# Patient Record
Sex: FEMALE | Race: WHITE | NOT HISPANIC OR LATINO | Employment: FULL TIME | URBAN - METROPOLITAN AREA
[De-identification: names, ages, dates, MRNs, and addresses within clinical notes are randomized per-mention and may not be internally consistent; named-entity substitution may affect disease eponyms.]

---

## 2017-03-08 ENCOUNTER — PRENATAL OFFICE VISIT (OUTPATIENT)
Dept: NURSING | Facility: CLINIC | Age: 37
End: 2017-03-08
Payer: COMMERCIAL

## 2017-03-08 VITALS
BODY MASS INDEX: 27 KG/M2 | SYSTOLIC BLOOD PRESSURE: 113 MMHG | DIASTOLIC BLOOD PRESSURE: 75 MMHG | HEART RATE: 66 BPM | HEIGHT: 66 IN | TEMPERATURE: 97.9 F | WEIGHT: 168 LBS

## 2017-03-08 DIAGNOSIS — Z34.90 SUPERVISION OF NORMAL PREGNANCY: Primary | ICD-10-CM

## 2017-03-08 LAB
ABO + RH BLD: NORMAL
ABO + RH BLD: NORMAL
ALBUMIN UR-MCNC: NEGATIVE MG/DL
APPEARANCE UR: CLEAR
BILIRUB UR QL STRIP: NEGATIVE
BLD GP AB SCN SERPL QL: NORMAL
BLOOD BANK CMNT PATIENT-IMP: NORMAL
COLOR UR AUTO: YELLOW
ERYTHROCYTE [DISTWIDTH] IN BLOOD BY AUTOMATED COUNT: 13.4 % (ref 10–15)
GLUCOSE UR STRIP-MCNC: NEGATIVE MG/DL
HBV SURFACE AG SERPL QL IA: NONREACTIVE
HCT VFR BLD AUTO: 40.3 % (ref 35–47)
HGB BLD-MCNC: 13.5 G/DL (ref 11.7–15.7)
HGB UR QL STRIP: NEGATIVE
HIV 1+2 AB+HIV1 P24 AG SERPL QL IA: NORMAL
KETONES UR STRIP-MCNC: NEGATIVE MG/DL
LEUKOCYTE ESTERASE UR QL STRIP: ABNORMAL
MCH RBC QN AUTO: 28.6 PG (ref 26.5–33)
MCHC RBC AUTO-ENTMCNC: 33.5 G/DL (ref 31.5–36.5)
MCV RBC AUTO: 85 FL (ref 78–100)
NITRATE UR QL: NEGATIVE
PH UR STRIP: 6 PH (ref 5–7)
PLATELET # BLD AUTO: 270 10E9/L (ref 150–450)
RBC # BLD AUTO: 4.72 10E12/L (ref 3.8–5.2)
SP GR UR STRIP: 1.02 (ref 1–1.03)
SPECIMEN EXP DATE BLD: NORMAL
URN SPEC COLLECT METH UR: ABNORMAL
UROBILINOGEN UR STRIP-ACNC: 0.2 EU/DL (ref 0.2–1)
WBC # BLD AUTO: 8.7 10E9/L (ref 4–11)

## 2017-03-08 PROCEDURE — 85027 COMPLETE CBC AUTOMATED: CPT | Performed by: ADVANCED PRACTICE MIDWIFE

## 2017-03-08 PROCEDURE — 99207 ZZC NO CHARGE NURSE ONLY: CPT

## 2017-03-08 PROCEDURE — 86900 BLOOD TYPING SEROLOGIC ABO: CPT | Performed by: ADVANCED PRACTICE MIDWIFE

## 2017-03-08 PROCEDURE — 87340 HEPATITIS B SURFACE AG IA: CPT | Performed by: ADVANCED PRACTICE MIDWIFE

## 2017-03-08 PROCEDURE — 87389 HIV-1 AG W/HIV-1&-2 AB AG IA: CPT | Performed by: ADVANCED PRACTICE MIDWIFE

## 2017-03-08 PROCEDURE — 36415 COLL VENOUS BLD VENIPUNCTURE: CPT | Performed by: ADVANCED PRACTICE MIDWIFE

## 2017-03-08 PROCEDURE — 86901 BLOOD TYPING SEROLOGIC RH(D): CPT | Performed by: ADVANCED PRACTICE MIDWIFE

## 2017-03-08 PROCEDURE — 86762 RUBELLA ANTIBODY: CPT | Performed by: ADVANCED PRACTICE MIDWIFE

## 2017-03-08 PROCEDURE — 87086 URINE CULTURE/COLONY COUNT: CPT | Performed by: ADVANCED PRACTICE MIDWIFE

## 2017-03-08 PROCEDURE — 86850 RBC ANTIBODY SCREEN: CPT | Performed by: ADVANCED PRACTICE MIDWIFE

## 2017-03-08 PROCEDURE — 86780 TREPONEMA PALLIDUM: CPT | Performed by: ADVANCED PRACTICE MIDWIFE

## 2017-03-08 PROCEDURE — 81003 URINALYSIS AUTO W/O SCOPE: CPT | Performed by: ADVANCED PRACTICE MIDWIFE

## 2017-03-08 NOTE — NURSING NOTE
"Chief Complaint   Patient presents with     Prenatal Care     new ob teaching and labs       Initial /75  Pulse 66  Temp 97.9  F (36.6  C) (Oral)  Ht 5' 6\" (1.676 m)  Wt 168 lb (76.2 kg)  LMP 01/02/2017  Breastfeeding? No  BMI 27.12 kg/m2 Estimated body mass index is 27.12 kg/(m^2) as calculated from the following:    Height as of this encounter: 5' 6\" (1.676 m).    Weight as of this encounter: 168 lb (76.2 kg).  Medication Reconciliation: complete    "

## 2017-03-08 NOTE — MR AVS SNAPSHOT
After Visit Summary   3/8/2017    Jasmin Benavidez    MRN: [id number]           Patient Information     Date Of Birth          1980        Visit Information        Provider Department      3/8/2017 8:15 AM RD OB NURSE EDUCATION [facility]        Today's Diagnoses     Supervision of normal pregnancy    -  1       Follow-ups after your visit        Additional Services     MAT FETAL MED CTR REFERRAL-PREGNANCY       >> Patient may proceed with recommendations for further testing as directed by the Maternal Fetal Medicine Specialist >>    >> If requesting Fetal Echo: MFM will determine appropriate location for exam due to indication.    >> If requesting Lung Maturity Amnio:  If results indicate fetal lung maturity, induction or C/S is recommended within 36 hours.  Please schedule accordingly.     Dear Patient:   Please be aware that coverage of these services is subject to the terms and limitations of your health insurance plan.  Call member services at your health plan with any benefit or coverage questions.      Please bring the following to your appointment:    >>  Any x-rays, CTs or MRIs which have been performed.  Contact the facility where they were done to arrange for  prior to your scheduled appointment.  Any new CT, MRI or other procedures ordered by your specialist must be performed at a [facility] facility or coordinated by your clinic's referral office.  >>  List of current medications   >>  This referral request   >>  Any documents/labs given to you for this referral                  Follow-up notes from your care team     Return in about 2 weeks (around 3/22/2017).      Your next 10 appointments already scheduled     Mar 22, 2017  8:15 AM CDT   New Prenatal with Karina Mayer CNM   [facility]    [address] [place] [address] [phone]              Who to contact     If you have  "questions or need follow up information about today's clinic visit or your schedule please contact [facility] directly at [phone].  Normal or non-critical lab and imaging results will be communicated to you by MyChart, letter or phone within 4 business days after the clinic has received the results. If you do not hear from us within 7 days, please contact the clinic through Sellfhart or phone. If you have a critical or abnormal lab result, we will notify you by phone as soon as possible.  Submit refill requests through Music Mastermind or call your pharmacy and they will forward the refill request to us. Please allow 3 business days for your refill to be completed.          Additional Information About Your Visit        SellfharQudini Information     [employer] gives you secure access to your electronic health record. If you see a primary care provider, you can also send messages to your care team and make appointments. If you have questions, please call your primary care clinic.  If you do not have a primary care provider, please call [phone] and they will assist you.        Care EveryWhere ID     This is your Care EveryWhere ID. This could be used by other organizations to access your [facility] medical records  [id number]        Your Vitals Were     Pulse Temperature Height Last Period Breastfeeding? BMI (Body Mass Index)    66 97.9  F (36.6  C) (Oral) 5' 6\" (1.676 m) 01/02/2017 No 27.12 kg/m2       Blood Pressure from Last 3 Encounters:   03/08/17 113/75   12/01/15 114/76   11/01/15 134/88    Weight from Last 3 Encounters:   03/08/17 168 lb (76.2 kg)   12/01/15 176 lb (79.8 kg)   10/29/15 202 lb 9.6 oz (91.9 kg)              We Performed the Following     ABO/RH TYPE AND SCREEN     Anti Treponema     CBC WITH PLATELETS     Hepatitis B surface antigen     HIV Antigen Antibody Combo     MAT FETAL MED CTR REFERRAL-PREGNANCY     Rubella Antibody IgG Quantitative     Urine Culture Aerobic Bacterial     URINE " MACROSCOPIC ONLY        Primary Care Provider Office Phone # Fax #    Stephany Noriega [phone]       [facility] [address] [facility] [address]        Thank you!     Thank you for choosing [facility]  for your care. Our goal is always to provide you with excellent care. Hearing back from our patients is one way we can continue to improve our services. Please take a few minutes to complete the written survey that you may receive in the mail after your visit with us. Thank you!             Your Updated Medication List - Protect others around you: Learn how to safely use, store and throw away your medicines at www.disposemymeds.org.          This list is accurate as of: 3/8/17  8:45 AM.  Always use your most recent med list.                   Brand Name Dispense Instructions for use    prenatal multivitamin  plus iron 27-0.8 MG Tabs per tablet      Take 1 tablet by mouth daily       sertraline 50 MG tablet    ZOLOFT    90 tablet    Take 1 tablet (50 mg) by mouth daily

## 2017-03-08 NOTE — PROGRESS NOTES
Patient presents for new ob teaching and labs, second pregnancy, AMA. Ordered first trimester screening. Handouts reviewed and given. Denies any problems at this time. Has appointment with ZULEMA 3/22/17    Caffeine intake/servings daily - 1  Calcium intake/servings daily - 3  Exercise 2 times weekly - describe ; walks  Sunscreen used - Yes  Seatbelts used - Yes  Guns stored in the home - No  Self Breast Exam - Yes  Pap test up to date -  Yes  Eye exam up to date -  Yes  Dental exam up to date -  Yes  DEXA scan up to date -  No  Flex Sig/Colonoscopy up to date -  No  Mammography up to date -  No  Immunizations reviewed and up to date - Yes  Abuse: Current or Past (Physical, Sexual or Emotional) -  No  Do you feel safe in your environment - Yes  Do you cope well with stress - Yes, takes medication  Do you suffer from insomnia - No    Prenatal OB Questionnaire  Past Medical History  Diabetes   No  Hypertension   No  Heart Disease, mitral valve prolapse, or rheumatic fever?   No  An autoimmune disorder such as Lupus or Rheumatoid Arthritis?   No  Kidney Disease or Urinary Tract Infection?   No  Epilepsy, seizures or spells?   No  Migraine headaches?   No  A stroke or loss of function or sensation?   No  Any other neurological problems?   No  Have you ever been treated for depression?  No  Are you having problems with crying spells or loss of self-esteem?   No  Have you ever required psychiatric care?   No  Have you ever hepatitis, liver disease or jaundice?   No  Have you ever been treated for blood clots in your veins, deep venous thrombosis, inflammation in the veins, thrombosis, phlebitis, pulmonary embolism or varicosities?   No  Have you had excessive bleeding after surgery or dental work?   No  Do you bleed more than other women after a cut or scratch?   No  Do you have a history of anemia?   No  Have you ever been treated for thyroid problems or taken thyroid medication?  No  Do you have any other endocrine  problems?  No  Have you ever been in a major accident or suffered serious trauma?   No  Within the last year, has anyone hit slapped, kicked or otherwise hurt you?  No  In the last year, has anyone forced you to have sex when you didn't want to?  No  Have you ever had a blood transfusion?   No  Would you refuse a blood transfusion if a doctor judged it to be medically necessary?   No  If you answered yes, would you rather die than have a blood transfusion?   No  If you answered yes, is this for Faith reasons?   No  Does anyone in your home smoke?   No  Do you use tobacco products?  No  Do you drink beer, wine, hard liquor?  No  Do you use any of the following: marijuana, speed, cocaine, heroine, hallucinogens, or other drugs?  No  Is your blood type Rh negative?   No  Have you ever had abnormal antibodies in your blood?   No  Have you ever had asthma?   No  Have you ever had tuberculosis?   No  Do you have any allergies to drugs or over-the-counter medications?   No    Allergies as of 3/8/2017:    Allergies as of 03/08/2017 - Kartik as Reviewed 03/08/2017   Allergen Reaction Noted     No known drug allergies  08/19/2003       Do you have any breast problems?   No  Have you ever ?   Yes  Have you had any gynecological surgical procedures such as cervical conization, a LEEP procedure, laser treatment, cryosurgery of the cervix, or a dilation and curettage, etc?  No  Have you had any other surgical procedures?  No  Have you been hospitalized for a nonsurgical reason excluding normal delivery?   No  Have you ever had any anesthetic complications?   No  Have you ever had an abnormal pap smear?   No  Do you have a history of abnormalities of the uterus?   No  Did it take you more than one year to become pregnant?   No  Have you ever been evaluated or treated for infertility?   No  Is there a history of medical problems in your family, which you feel might adversely affect your health or pregnancy?   No  Do you  have any other problems we have not asked you about which you feel may be important to this pregnancy?  No    Symptoms since Last Menstrual Period  Do you have any of the following:    *abdominal pain  No  *blood in stool or urine  No  *chest pain  No  *shortness of breath  No  *coughing or vomiting up blood No  *heart racing or skipping beats  No  *nausea and vomiting  No  *pain with urination  No  *vaginal discharge or bleeding  No  Current medications are:  Current Outpatient Prescriptions   Medication Sig Dispense Refill     sertraline (ZOLOFT) 50 MG tablet Take 1 tablet (50 mg) by mouth daily 90 tablet 3     Prenatal Vit-Fe Fumarate-FA (PRENATAL MULTIVITAMIN  PLUS IRON) 27-0.8 MG TABS Take 1 tablet by mouth daily         Genetic Screening  At the time of birth, will you be 35 years old or older?  Yes  Has the patient, baby s father, or anyone in either family had:  Thalassemia (Italian, Greek, Mediterranean, or  background only) and an MCV result less than 80?  No  Neural tube defect such as meningomyelocele, spina bifida or anencephaly?  No  Congenital heart defect?  No  Down s syndrome?  No  Marito-Sach s disease (Congregational, Cajun, Polish-St Lucian)?  No  Sickle cell disease or trait (O[place]da)?  No  Hemophilia or other inherited problems of blood coagulation? No  Muscular dystrophy?  No  Cystic Fibrosis?  No  Paige s chorea?  No  Mental retardation/autism? No   If yes, was the person tested for fragile X?  No  Any other inherited genetic or chromosomal disorder?  No  Maternal metabolic disorder (e.g. insulin-dependent diabetes, PKU)? No  A child with birth defects not listed above?  No  Recurrent pregnancy loss or a stillbirth?  No  Has the patient had any medications/street drugs/alcohol since her last menstrual period? No  Does the patient or baby s father have any other genetic risks?  No  Infection History  Do you object to being tested for Hepatitis B? No  Do you object to being tested for HIV? No  Do  you feel that you are at high risk for coming in contact with the AIDS virus?  No  Have you ever been treated for tuberculosis?  No  Have you ever received the BCG vaccine for tuberculosis?  No  Have you ever had a positive skin test for tuberculosis? No  Do you live with someone who has tuberculosis?  No  Have you ever been exposed to tuberculosis?  Yes  Do you have genital herpes?  No  Does your partner have genital herpes?  No  Have you had a rash or viral illness since your last period?  No  Have you ever had Gonorrhea, Chlamydia, Syphilis, venereal warts, trichomoniasis, pelvic inflammatory disease or any other sexually transmitted disease?  No  Do you know if you are a genital group B streptococcus carrier? No  You have not had chicken pox/varicella  Yes  Have you been vaccinated against chicken pox?  No  Have you had any other infectious disease? No        Early ultrasound screening tool:    Does patient have irregular periods?  No  Did patient use hormonal birth control in the three months prior to positive urine pregnancy test? No  Is the patient breastfeeding?  No  Is the patient 10 weeks or greater at time of education visit?  No

## 2017-03-09 LAB
BACTERIA SPEC CULT: NORMAL
MICRO REPORT STATUS: NORMAL
RUBV IGG SERPL IA-ACNC: 43 IU/ML
SPECIMEN SOURCE: NORMAL
T PALLIDUM IGG+IGM SER QL: NEGATIVE

## 2017-03-16 ENCOUNTER — MYC MEDICAL ADVICE (OUTPATIENT)
Dept: FAMILY MEDICINE | Facility: CLINIC | Age: 37
End: 2017-03-16

## 2017-03-16 DIAGNOSIS — F32.1 MAJOR DEPRESSIVE DISORDER, SINGLE EPISODE, MODERATE (H): ICD-10-CM

## 2017-03-16 DIAGNOSIS — Z33.1 PREGNANCY, INCIDENTAL: Primary | ICD-10-CM

## 2017-03-16 NOTE — LETTER
My Depression Action Plan  Name: Jasmin Benavidez   Date of Birth 1980  Date: 3/17/2017    My doctor: Stephany Noriega   My clinic: [address] [address] [phone]          GREEN    ZONE   Good Control    What it looks like:     Things are going generally well. You have normal up s and down s. You may even feel depressed from time to time, but bad moods usually last less than a day.   What you need to do:  1. Continue to care for yourself (see self care plan)  2. Check your depression survival kit and update it as needed  3. Follow your physician s recommendations including any medication.  4. Do not stop taking medication unless you consult with your physician first.           YELLOW         ZONE Getting Worse    What it looks like:     Depression is starting to interfere with your life.     It may be hard to get out of bed; you may be starting to isolate yourself from others.    Symptoms of depression are starting to last most all day and this has happened for several days.     You may have suicidal thoughts but they are not constant.   What you need to do:     1. Call your care team, your response to treatment will improve if you keep your care team informed of your progress. Yellow periods are signs an adjustment may need to be made.     2. Continue your self-care, even if you have to fake it!    3. Talk to someone in your support network    4. Open up your depression survival kit           RED    ZONE Medical Alert - Get Help    What it looks like:     Depression is seriously interfering with your life.     You may experience these or other symptoms: You can t get out of bed most days, can t work or engage in other necessary activities, you have trouble taking care of basic hygiene, or basic responsibilities, thoughts of suicide or death that will not go away, self-injurious behavior.     What you need to do:  1. Call your care team  and request a same-day appointment. If they are not available (weekends or after hours) call your local crisis line, emergency room or 911.      Electronically signed by: Stephany Noriega, March 17, 2017    Depression Self Care Plan / Survival Kit    Self-Care for Depression  Here s the deal. Your body and mind are really not as separate as most people think.  What you do and think affects how you feel and how you feel influences what you do and think. This means if you do things that people who feel good do, it will help you feel better.  Sometimes this is all it takes.  There is also a place for medication and therapy depending on how severe your depression is, so be sure to consult with your medical provider and/ or Behavioral Health Consultant if your symptoms are worsening or not improving.     In order to better manage my stress, I will:    Exercise  Get some form of exercise, every day. This will help reduce pain and release endorphins, the  feel good  chemicals in your brain. This is almost as good as taking antidepressants!  This is not the same as joining a gym and then never going! (they count on that by the way ) It can be as simple as just going for a walk or doing some gardening, anything that will get you moving.      Hygiene   Maintain good hygiene (Get out of bed in the morning, Make your bed, Brush your teeth, Take a shower, and Get dressed like you were going to work, even if you are unemployed).  If your clothes don't fit try to get ones that do.    Diet  I will strive to eat foods that are good for me, drink plenty of water, and avoid excessive sugar, caffeine, alcohol, and other mood-altering substances.  Some foods that are helpful in depression are: complex carbohydrates, B vitamins, flaxseed, fish or fish oil, fresh fruits and vegetables.    Psychotherapy  I agree to participate in Individual Therapy (if recommended).    Medication  If prescribed medications, I agree to take them.   Missing doses can result in serious side effects.  I understand that drinking alcohol, or other illicit drug use, may cause potential side effects.  I will not stop my medication abruptly without first discussing it with my provider.    Staying Connected With Others  I will stay in touch with my friends, family members, and my primary care provider/team.    Use your imagination  Be creative.  We all have a creative side; it doesn t matter if it s oil painting, sand castles, or mud pies! This will also kick up the endorphins.    Witness Beauty  (AKA stop and smell the roses) Take a look outside, even in mid-winter. Notice colors, textures. Watch the squirrels and birds.     Service to others  Be of service to others.  There is always someone else in need.  By helping others we can  get out of ourselves  and remember the really important things.  This also provides opportunities for practicing all the other parts of the program.    Humor  Laugh and be silly!  Adjust your TV habits for less news and crime-drama and more comedy.    Control your stress  Try breathing deep, massage therapy, biofeedback, and meditation. Find time to relax each day.     My support system    Clinic Contact:  Phone number:    Contact 1:  Phone number:    Contact 2:  Phone number:    Anabaptist/:  Phone number:    Therapist:  Phone number:    Local crisis center:    Phone number:    Other community support:  Phone number:

## 2017-03-17 ENCOUNTER — MYC MEDICAL ADVICE (OUTPATIENT)
Dept: FAMILY MEDICINE | Facility: CLINIC | Age: 37
End: 2017-03-17

## 2017-03-17 PROBLEM — Z33.1 PREGNANCY, INCIDENTAL: Status: ACTIVE | Noted: 2017-03-17

## 2017-03-17 ASSESSMENT — PATIENT HEALTH QUESTIONNAIRE - PHQ9
SUM OF ALL RESPONSES TO PHQ QUESTIONS 1-9: 4
10. IF YOU CHECKED OFF ANY PROBLEMS, HOW DIFFICULT HAVE THESE PROBLEMS MADE IT FOR YOU TO DO YOUR WORK, TAKE CARE OF THINGS AT HOME, OR GET ALONG WITH OTHER PEOPLE: SOMEWHAT DIFFICULT

## 2017-03-17 NOTE — TELEPHONE ENCOUNTER
I sent the following my chart message. Nothing further to do. Note closed.  Sincerely,    Stephany Noriega   3/17/2017      Congratulations, that's great news! I hope you're feeling okay. I'm happy to refill your Zoloft. Would you mind completing the depression questionnaire? I've tried to attach it to this message, but please let me know if you can't find it, thanks!    Sincerely,  Dr. Stephany Noriega MD  3/17/2017      PHQ-9 SCORE 4/30/2015 9/30/2015 12/1/2015   Total Score 3 - -   Total Score - 2 4      Health Maintenance Due   Topic Date Due     PHQ-9 Q6 MONTHS (NO INBASKET)  06/01/2016     DEPRESSION ACTION PLAN Q1 YR (NO INBASKET)  11/06/2016

## 2017-03-17 NOTE — TELEPHONE ENCOUNTER
PHQ-9 SCORE 9/30/2015 12/1/2015 3/17/2017   Total Score - - -   Total Score [facility] - - 4 (Minimal depression)   Total Score 2 4 -

## 2017-03-18 ASSESSMENT — PATIENT HEALTH QUESTIONNAIRE - PHQ9: SUM OF ALL RESPONSES TO PHQ QUESTIONS 1-9: 4

## 2017-03-21 ENCOUNTER — PRE VISIT (OUTPATIENT)
Dept: MATERNAL FETAL MEDICINE | Facility: CLINIC | Age: 37
End: 2017-03-21

## 2017-03-22 ENCOUNTER — PRENATAL OFFICE VISIT (OUTPATIENT)
Dept: MIDWIFE SERVICES | Facility: CLINIC | Age: 37
End: 2017-03-22
Payer: COMMERCIAL

## 2017-03-22 VITALS
SYSTOLIC BLOOD PRESSURE: 123 MMHG | TEMPERATURE: 97.2 F | WEIGHT: 168.3 LBS | HEIGHT: 66 IN | HEART RATE: 66 BPM | DIASTOLIC BLOOD PRESSURE: 77 MMHG | BODY MASS INDEX: 27.05 KG/M2

## 2017-03-22 DIAGNOSIS — F33.1 MAJOR DEPRESSIVE DISORDER, RECURRENT EPISODE, MODERATE (H): ICD-10-CM

## 2017-03-22 PROCEDURE — 99207 ZZC FIRST OB VISIT: CPT | Performed by: ADVANCED PRACTICE MIDWIFE

## 2017-03-22 NOTE — MR AVS SNAPSHOT
After Visit Summary   3/22/2017    Jasmin Benavidez    MRN: [id number]           Patient Information     Date Of Birth          1980        Visit Information        Provider Department      3/22/2017 8:15 AM Karina Mayer CNM [facility]        Today's Diagnoses     Major depressive disorder, recurrent episode, moderate (H)           Follow-ups after your visit        Follow-up notes from your care team     Return in about 4 weeks (around 4/19/2017) for Prenatal care with ZULEMA.      Your next 10 appointments already scheduled     Mar 24, 2017  8:00 AM CDT   Genetic Counseling with UR GEN COUNSELOR 1   [facility] - [facility])    [address]  [facility] [address]   [phone]            Mar 24, 2017  8:45 AM CDT   HUBER NUCHAL TRANSLUCENCY with [id number]   [facility]etal [facility] Ultrasound - [place] ([facility])    [address]  [facility] [address]   [phone]            Mar 24, 2017  9:15 AM CDT   Radiology MD with ELIANE NGO MD   [facility] - [facility])    [address]  [facility] [address]   [phone]           Please arrive at the time given for your first appointment.  This visit is used internally to schedule the physician's time during your ultrasound.              Who to contact     If you have questions or need follow up information about today's clinic visit or your schedule please contact [facility] directly at [phone].  Normal or non-critical lab and imaging results will be communicated to you by MyChart, letter or phone within 4 business days after the clinic has received the results. If you do not hear from us within 7 days, please contact the clinic through MyChart or phone. If you have a critical or abnormal lab result, we will  "notify you by phone as soon as possible.  Submit refill requests through Tablus or call your pharmacy and they will forward the refill request to us. Please allow 3 business days for your refill to be completed.          Additional Information About Your Visit        FarseerharEvento Information     Tablus gives you secure access to your electronic health record. If you see a primary care provider, you can also send messages to your care team and make appointments. If you have questions, please call your primary care clinic.  If you do not have a primary care provider, please call [phone] and they will assist you.        Care EveryWhere ID     This is your Care EveryWhere ID. This could be used by other organizations to access your [facility] medical records  [id number]        Your Vitals Were     Pulse Temperature Height Last Period BMI (Body Mass Index)       66 97.2  F (36.2  C) (Oral) 5' 6\" (1.676 m) 01/02/2017 27.16 kg/m2        Blood Pressure from Last 3 Encounters:   03/22/17 123/77   03/08/17 113/75   12/01/15 114/76    Weight from Last 3 Encounters:   03/22/17 168 lb 4.8 oz (76.3 kg)   03/08/17 168 lb (76.2 kg)   12/01/15 176 lb (79.8 kg)              Today, you had the following     No orders found for display       Primary Care Provider Office Phone # Fax #    Stephany Noriega [phone]       [facility] [address]  [facility] [address]        Thank you!     Thank you for choosing [facility]  for your care. Our goal is always to provide you with excellent care. Hearing back from our patients is one way we can continue to improve our services. Please take a few minutes to complete the written survey that you may receive in the mail after your visit with us. Thank you!             Your Updated Medication List - Protect others around you: Learn how to safely use, store and throw away your medicines at www.disposemymeds.org.          This list is " accurate as of: 3/22/17 11:07 AM.  Always use your most recent med list.                   Brand Name Dispense Instructions for use    prenatal multivitamin  plus iron 27-0.8 MG Tabs per tablet      Take 1 tablet by mouth daily       sertraline 50 MG tablet    ZOLOFT    90 tablet    Take 1 tablet (50 mg) by mouth daily

## 2017-03-22 NOTE — NURSING NOTE
"Chief Complaint   Patient presents with     Prenatal Care       Initial /77  Pulse 66  Temp 97.2  F (36.2  C) (Oral)  Ht 5' 6\" (1.676 m)  Wt 168 lb 4.8 oz (76.3 kg)  LMP 2017  BMI 27.16 kg/m2 Estimated body mass index is 27.16 kg/(m^2) as calculated from the following:    Height as of this encounter: 5' 6\" (1.676 m).    Weight as of this encounter: 168 lb 4.8 oz (76.3 kg).  BP completed using cuff size: regular        The following HM Due: NONE      The following patient reported/Care Every where data was sent to:  P ABSTRACT QUALITY INITIATIVES [[id number]]       patient has appointment for today    Lurdes Garcia CMA               "

## 2017-03-22 NOTE — PROGRESS NOTES
is a  partnered   2 para 1 who presents for a new OB Visit. She has not had bleeding since her LMP.  She has had mild nausea.. Weigh loss   has not occurred.. She denies fever, chills and viral infections since her last LMP.  There is mildfatigue.  This was a planned pregnancy, but she didn't expect to get pregnant so quickly.  She is a previous CNM patient.  FOB is Cullen.   =========================================    INFECTION HISTORY  HIV: no  Hepatitis B: no  Hepatitis C: no  Tuberculosis: no   Herpes self: no  Herpes partner:  no  Chlamydia:  no  Gonorrhea:  no  HPV: no  BV:  no  Trichomonis:  no  Syphilis:  no  ============================================    PERSONAL/SOCIAL HISTORY  Lives lives with their family.  =============================================    REVIEW OF SYSTEMS  CONSTITUTIONAL: Denies fever, chills and night sweats  DIET: Appetite is continue.  Eats a regular.  Plans to gain 15-25 pounds with her pregnancy.  SKIN: Denies changes and suspicious moles or lesions.  ENT: Denies blurred vision, hearing loss, tinnitus, frequent colds, epistaxis, hoarseness and tooth pain.    ENDOCRINE: Denies heat and cold intolerance, and polydypsia.    BREASTS:  Tender since LMP.  Denies discharge and lumps.   HEART/LUNGS: Denies dyspnea, wheezing, coughing and chest pain.  HEMATOLOGIC: Denies tendency to bruise and history of blood clots.  GASTROINTESTINAL: Denies heartburn, indigestion and change of color in stools.    GENITOURINARY:  Denies urgency, dysuria and hematuria.  Has frequency of urination since LMP.   NEUROLOGIC:  Denies seizures, weakness and fainting.  PSYCHIATRIC:  Denies mood changes - doing well with Zoloft  ===========================================    PHYSICAL EXAM  GENERAL:   pleasant pregnant female, alert, cooperative and well groomed.  SKIN:  Warm and dry, without lesions or tenderness.    HEAD: Symmetrical features.  EYES:  PERRLA, wears no corective lenses.  EARS:  Tympanic membranes gray, translucent and intact.  NOSE: No flaring, patent  MOUTH:  Buccal mucosa pink, moist without lesions.  Teeth in good repair.    NECK:  Thyroid without enlargement and nodules.  Lymph nodes not palpable.   LUNGS:  Clear to auscultation.  BREAST:  Symmetrical without lesions or nodes.  Nipples everted.  Areolas symmetric.  No palpable axillary nodes.  HEART:  RRR without murmur.  ABDOMEN: Soft without masses or tenderness.  No CVA tenderness.  Uterus palpable at size equal to dates.    MUSCULOSKELETAL:  Full range of motion  GENITALIA: Exam Deferred - up to date on testing  EXTREMITIES:  2+/2+ DTR, No edema. No significant varicosities.  ===================================================    PLAN:  Instructed on use of triage nurse line and contacting the on call CNM after hours in an emergency.    Discussed the indications, uses for and false positives for quad screen and fetal survey ultrasounds at 18-20 weeks gestation.  Will inform us at the next visit if she wished to avail herself of these screens.    Will return to the clinic in 4 weeks for her next routine prenatal check.  Will call to be seen sooner if problem arise.  Discussed the risks, benefits, side effects and alternative therapies for current prescribed and OTC medications.  Oriented to  CMN Service, ZAC and added to list.      Karina Mayer, DNP, APRN, CNM

## 2017-03-24 ENCOUNTER — HOSPITAL ENCOUNTER (OUTPATIENT)
Dept: ULTRASOUND IMAGING | Facility: CLINIC | Age: 37
Discharge: HOME | End: 2017-03-24
Attending: ADVANCED PRACTICE MIDWIFE | Admitting: ADVANCED PRACTICE MIDWIFE
Payer: COMMERCIAL

## 2017-03-24 ENCOUNTER — OFFICE VISIT (OUTPATIENT)
Dept: MATERNAL FETAL MEDICINE | Facility: CLINIC | Age: 37
End: 2017-03-24
Attending: ADVANCED PRACTICE MIDWIFE
Payer: COMMERCIAL

## 2017-03-24 DIAGNOSIS — O09.521 AMA (ADVANCED MATERNAL AGE) MULTIGRAVIDA 35+, FIRST TRIMESTER: Primary | ICD-10-CM

## 2017-03-24 DIAGNOSIS — O26.90 PREGNANCY RELATED CONDITION, UNSPECIFIED TRIMESTER: ICD-10-CM

## 2017-03-24 DIAGNOSIS — Z36.82 NUCHAL TRANSLUCENCY OF FETUS ON PRENATAL ULTRASOUND: ICD-10-CM

## 2017-03-24 DIAGNOSIS — O09.521 AMA (ADVANCED MATERNAL AGE) MULTIGRAVIDA 35+, FIRST TRIMESTER: ICD-10-CM

## 2017-03-24 PROCEDURE — 76813 OB US NUCHAL MEAS 1 GEST: CPT

## 2017-03-24 PROCEDURE — 96040 ZZH GENETIC COUNSELING, EACH 30 MINUTES: CPT | Mod: ZF | Performed by: GENETIC COUNSELOR, MS

## 2017-03-24 PROCEDURE — 36415 COLL VENOUS BLD VENIPUNCTURE: CPT | Performed by: OBSTETRICS & GYNECOLOGY

## 2017-03-24 PROCEDURE — 40000791 ZZHCL STATISTIC VERIFI PRENATAL TRISOMY 21,18,13: Performed by: OBSTETRICS & GYNECOLOGY

## 2017-03-24 NOTE — PROGRESS NOTES
[facility]  Genetic Counseling Consult    Patient: Jasmin Benavidez YOB: 1980   Date of Service: 3/24/17      Jasmin Benavidez was seen with her  Cullen at [facility] [place] for genetic consultation to discuss the options for screening and testing for fetal chromosome abnormalities.  The indication for genetic counseling is advanced maternal age.        Impression/Plan:   1.  Jasmin had an ultrasound and blood draw for NIPT (Innatal test through [facility]).  Results are expected within 7-10 days, and will be available in [facility].  We will contact her to discuss the results, and a copy will be forwarded to the office of the referring OB provider.    2.  Maternal serum AFP (single marker screen) is recommended after 15 weeks to screen for open neural tube defects. A quad screen should not be performed.    3.  An 18-20 week comprehensive ultrasound is standard of care for all women 35 or older at delivery.    Pregnancy History:   /Parity:    Age at Delivery: 37 year old  HUNTER: 10/9/2017, by Last Menstrual Period  Gestational Age: 11w4d    No significant complications or exposures were reported in the current pregnancy.        Medical History:   Jasmin s reported medical history is not expected to impact pregnancy management or risks to fetal development.       Family History:   A three-generation pedigree was obtained, and is scanned under the  Media  tab.   The following significant findings were reported by Jasmin:    Jasmin works for a nonprofit.  Cullen is a .  They have a 16 month old daughter who is healthy.    Cullen reported his sister, a twin was born with congenital hip dysplasia.  We reviewed multifactorial inheritance and slightly increased risk for this pregnancy.    Jasmin reported a paternal aunt with colon cancer in her 40's and an AVM. Jasmin reported her paternal grandmother, a smoker had breast  cancer in her 60's.  No other reports of family members with AVM's.    Cullen reported a paternal 1st cousin, male with intellectual disability.    Otherwise, the reported family history is negative for multiple miscarriages, stillbirths, birth defects, mental retardation, known genetic conditions, and consanguinity.       Carrier Screening:   The patient reports that she and the father of the pregnancy have  ancestry:      Cystic fibrosis is an autosomal recessive genetic condition that occurs with increased frequency in individuals of  ancestry and carrier screening for this condition is available.  In addition,  screening in the [facility] includes cystic fibrosis.      Expanded carrier screening for mutations in a large panel of genes associated with autosomal recessive conditions including cystic fibrosis, spinal muscular atrophy, and others, is now available.      The patient was not certain about whether to pursue carrier screening today.  She was provided with a brochure about her testing options, and contact information if she has questions or wishes to pursue screening.       Risk Assessment for Chromosome Conditions:   We explained that the risk for fetal chromosome abnormalities increases with maternal age. We discussed specific features of common chromosome abnormalities, including Down syndrome, trisomy 13, trisomy 18, and sex chromosome trisomies.      - At age 37 at delivery, the risk to have a baby with Down syndrome is 1 in 294.     - At age 37 at delivery, the risk to have a baby with any chromosome abnormality is 1 in 163.          Testing Options:   We discussed the following options:   Non-invasive Prenatal Testing (NIPT)    Maternal plasma cell-free DNA testing; first trimester ultrasound with nuchal translucency and nasal bone assessment is recommended, when appropriate    Screens for fetal trisomy 21, trisomy 13, trisomy 18, and sex chromosome  aneuploidy    Cannot screen for open neural tube defects; maternal serum AFP after 15 weeks is recommended     Chorionic villus sampling (CVS)    Invasive procedure typically performed in the first trimester by which placental villi are obtained for the purpose of chromosome analysis and/or other prenatal genetic analysis    Diagnostic results; >99% sensitivity for fetal chromosome abnormalities    Cannot test for open neural tube defects; maternal serum AFP after 15 weeks is recommended     Genetic Amniocentesis    Invasive procedure typically performed in the second trimester by which amniotic fluid is obtained for the purpose of chromosome analysis and/or other prenatal genetic analysis    Diagnostic results; >99% sensitivity for fetal chromosome abnormalities    AFAFP measurement tests for open neural tube defects     Comprehensive (Level II) ultrasound: Detailed ultrasound performed between 18-22 weeks gestation to screen for major birth defects and markers for aneuploidy.        We reviewed the benefits and limitations of this testing.  Screening tests provide a risk assessment specific to the pregnancy for certain fetal chromosome abnormalities, but cannot definitively diagnose or exclude a fetal chromosome abnormality.  Follow-up genetic counseling and consideration of diagnostic testing is recommended with any abnormal screening result.     Diagnostic tests carry inherent risks- including risk of miscarriage- that require careful consideration.  These tests can detect fetal chromosome abnormalities with greater than 99% certainty.  Results can be compromised by maternal cell contamination or mosaicism, and are limited by the resolution of cytogenetic G-banding technology.  There is no screening nor diagnostic test that can detect all forms of birth defects or mental disability.     It was a pleasure to be involved with Jasmin [facility]. Face-to-face time of the meeting was 30 minutes.    Nery Bosch  [facility] [place], [place]  Certified Genetic Counselor  Pager: [phone]

## 2017-03-24 NOTE — PROGRESS NOTES
Please refer to ultrasound report under 'Imaging' Studies of 'Chart Review' tabs.    Kevin Moss M.D.

## 2017-03-24 NOTE — MR AVS SNAPSHOT
After Visit Summary   3/24/2017    Jasmin Benavidez    MRN: [id number]           Patient Information     Date Of Birth          1980        Visit Information        Provider Department      3/24/2017 9:15 AM Kevin Moss MD [facility] Maternal Fetal Medicine [facility]        Today's Diagnoses     AMA (advanced maternal age) multigravida 35+, first trimester    -  1    Nuchal translucency of fetus on prenatal ultrasound           Follow-ups after your visit        Your next 10 appointments already scheduled     May 09, 2017  8:00 AM CDT   MFM US COMP with [id number]   [facility] Maternal Fetal Medicine Ultrasound - [facility])    [address]  [facility] [address] [phone]           Wear comfortable clothes and leave your valuables at home.            May 09, 2017  8:30 AM CDT   Radiology MD with UR HUBER PEREZ   [facility] Maternal Fetal Medicine - [facility])    [address]  [facility] [id number] [phone]           Please arrive at the time given for your first appointment.  This visit is used internally to schedule the physician's time during your ultrasound.              Future tests that were ordered for you today     Open Future Orders        Priority Expected Expires Ordered    MFM US Comprehensive Single Routine  1/24/2018 3/24/2017    Verifi Test by Crossbar Routine 3/24/2017 6/22/2017 3/24/2017            Who to contact     If you have questions or need follow up information about today's clinic visit or your schedule please contact [facility] directly at [phone].  Normal or non-critical lab and imaging results will be communicated to you by MyChart, letter or phone within 4 business days after the clinic has received the results. If you do not hear from us within 7 days, please contact the clinic through MyChart or phone. If you have a  critical or abnormal lab result, we will notify you by phone as soon as possible.  Submit refill requests through [employer] or call your pharmacy and they will forward the refill request to us. Please allow 3 business days for your refill to be completed.          Additional Information About Your Visit        [employer] Information     [employer] gives you secure access to your electronic health record. If you see a primary care provider, you can also send messages to your care team and make appointments. If you have questions, please call your primary care clinic.  If you do not have a primary care provider, please call [phone] and they will assist you.        Care EveryWhere ID     This is your Care EveryWhere ID. This could be used by other organizations to access your [place] medical records  [id number]        Your Vitals Were     Last Period                   01/02/2017            Blood Pressure from Last 3 Encounters:   03/22/17 123/77   03/08/17 113/75   12/01/15 114/76    Weight from Last 3 Encounters:   03/22/17 76.3 kg (168 lb 4.8 oz)   03/08/17 76.2 kg (168 lb)   12/01/15 79.8 kg (176 lb)               Primary Care Provider Office Phone # Fax #    Stepahny Noriega [phone]       [facility] [address]  [facility] [address]        Thank you!     Thank you for choosing [facility]  for your care. Our goal is always to provide you with excellent care. Hearing back from our patients is one way we can continue to improve our services. Please take a few minutes to complete the written survey that you may receive in the mail after your visit with us. Thank you!             Your Updated Medication List - Protect others around you: Learn how to safely use, store and throw away your medicines at www.disposemymeds.org.          This list is accurate as of: 3/24/17  9:21 AM.  Always use your most recent med list.                   Brand Name  Dispense Instructions for use    prenatal multivitamin  plus iron 27-0.8 MG Tabs per tablet      Take 1 tablet by mouth daily       sertraline 50 MG tablet    ZOLOFT    90 tablet    Take 1 tablet (50 mg) by mouth daily

## 2017-03-24 NOTE — MR AVS SNAPSHOT
After Visit Summary   3/24/2017    Jasmin Benavidez    MRN: [id number]           Patient Information     Date Of Birth          1980        Visit Information        Provider Department      3/24/2017 8:00 AM Nery Bosch GC [facility] Maternal Fetal Medicine [facility]        Today's Diagnoses     AMA (advanced maternal age) multigravida 35+, first trimester    -  1    Pregnancy related condition, unspecified trimester           Follow-ups after your visit        Your next 10 appointments already scheduled     May 09, 2017  8:00 AM CDT   M US COMP with [id number]   [facility] Maternal Fetal Medicine Ultrasound - [place] [facility])    [address]  [facility] [id number] [phone]           Wear comfortable clothes and leave your valuables at home.            May 09, 2017  8:30 AM CDT   Radiology MD with UR MFM MD   [facility] Maternal Fetal Medicine - [facility])    [address]  [facility] [address]   [phone]           Please arrive at the time given for your first appointment.  This visit is used internally to schedule the physician's time during your ultrasound.              Future tests that were ordered for you today     Open Future Orders        Priority Expected Expires Ordered    M US Comprehensive Single Routine  1/24/2018 3/24/2017            Who to contact     If you have questions or need follow up information about today's clinic visit or your schedule please contact [facility]ETAL [facility]IC[facility] directly at [phone].  Normal or non-critical lab and imaging results will be communicated to you by MyChart, letter or phone within 4 business days after the clinic has received the results. If you do not hear from us within 7 days, please contact the clinic through MyChart or phone. If you have a critical or abnormal lab result, we will notify you by phone as soon as  possible.  Submit refill requests through [employer] or call your pharmacy and they will forward the refill request to us. Please allow 3 business days for your refill to be completed.          Additional Information About Your Visit        [employer] Information     [employer] gives you secure access to your electronic health record. If you see a primary care provider, you can also send messages to your care team and make appointments. If you have questions, please call your primary care clinic.  If you do not have a primary care provider, please call [phone] and they will assist you.        Care EveryWhere ID     This is your Care EveryWhere ID. This could be used by other organizations to access your [place] medical records  [id number]        Your Vitals Were     Last Period                   01/02/2017            Blood Pressure from Last 3 Encounters:   03/22/17 123/77   03/08/17 113/75   12/01/15 114/76    Weight from Last 3 Encounters:   03/22/17 76.3 kg (168 lb 4.8 oz)   03/08/17 76.2 kg (168 lb)   12/01/15 79.8 kg (176 lb)              We Performed the Following     [facility] Genetic Counseling        Primary Care Provider Office Phone # Fax #    Stephany Noriega [phone]       [place] [address] [facility] [address]        Thank you!     Thank you for choosing [facility]  for your care. Our goal is always to provide you with excellent care. Hearing back from our patients is one way we can continue to improve our services. Please take a few minutes to complete the written survey that you may receive in the mail after your visit with us. Thank you!             Your Updated Medication List - Protect others around you: Learn how to safely use, store and throw away your medicines at www.disposemymeds.org.          This list is accurate as of: 3/24/17  9:59 AM.  Always use your most recent med list.                   Brand Name Dispense  Instructions for use    prenatal multivitamin  plus iron 27-0.8 MG Tabs per tablet      Take 1 tablet by mouth daily       sertraline 50 MG tablet    ZOLOFT    90 tablet    Take 1 tablet (50 mg) by mouth daily

## 2017-04-03 ENCOUNTER — TELEPHONE (OUTPATIENT)
Dept: MATERNAL FETAL MEDICINE | Facility: CLINIC | Age: 37
End: 2017-04-03

## 2017-04-03 LAB — LAB SCANNED RESULT: NORMAL

## 2017-04-03 NOTE — TELEPHONE ENCOUNTER
I notified Jasmin of her normal NIPT (Innatal) results which show a very low likelihood for the baby to have trisomy 21, trisomy 18, trisomy 13, or sex chromosome aneuploidy.  Per Jasmin's request, male gender was revealed.  I explained that these results are very reassuring, though not definitive, and amniocentesis is not indicated at this time but remains an option.  Jasmin was pleased & is comfortable declining amniocentesis.    Serum AFP screening between 15-22 weeks is recommended to screen for open neural tube defects.      Aminata Alexandre MS, [facility]  Certified Genetic Counselor  April 3, 2017  10:44 AM

## 2017-04-21 ENCOUNTER — PRENATAL OFFICE VISIT (OUTPATIENT)
Dept: MIDWIFE SERVICES | Facility: CLINIC | Age: 37
End: 2017-04-21
Payer: COMMERCIAL

## 2017-04-21 VITALS
BODY MASS INDEX: 26.9 KG/M2 | HEART RATE: 96 BPM | SYSTOLIC BLOOD PRESSURE: 125 MMHG | DIASTOLIC BLOOD PRESSURE: 83 MMHG | TEMPERATURE: 97 F | WEIGHT: 167.4 LBS | HEIGHT: 66 IN

## 2017-04-21 DIAGNOSIS — O09.522 AMA (ADVANCED MATERNAL AGE) MULTIGRAVIDA 35+, SECOND TRIMESTER: Primary | ICD-10-CM

## 2017-04-21 PROCEDURE — 99000 SPECIMEN HANDLING OFFICE-LAB: CPT | Performed by: ADVANCED PRACTICE MIDWIFE

## 2017-04-21 PROCEDURE — 82105 ALPHA-FETOPROTEIN SERUM: CPT | Mod: 90 | Performed by: ADVANCED PRACTICE MIDWIFE

## 2017-04-21 PROCEDURE — 99207 ZZC PRENATAL VISIT: CPT | Performed by: ADVANCED PRACTICE MIDWIFE

## 2017-04-21 PROCEDURE — 36415 COLL VENOUS BLD VENIPUNCTURE: CPT | Performed by: ADVANCED PRACTICE MIDWIFE

## 2017-04-21 NOTE — PROGRESS NOTES
15w4d   Patient feeling well. Denies any vaginal bleeding, water leaking, abdominal pain, or other concerns. Not feeling any movements but saw a lot of movement when they did their ultrasound.   Feeling really stressed out this past month. Her mother has been dealing with smaller episodes of cancer but recently had to move to hospice. They are also buying a house and moving in June and needing to sell her condo. She is asking about stress and the baby. We discussed this type of stress is unavoidable and to do her best getting through her situation.   Discussed genetic testing. AFP done today. Know they are having a boy with early testing. Going to try and name him to share with her mother.   Danger signs discussed. Patient knows when to call triage and has numbers to call.   Follow up in 4 weeks after [facility] fetal survey ultrasound.   Debbie Kwon CNM

## 2017-04-21 NOTE — MR AVS SNAPSHOT
After Visit Summary   4/21/2017    Jasmin Benavidez    MRN: [id number]           Patient Information     Date Of Birth          1980        Visit Information        Provider Department      4/21/2017 9:00 AM Debbie Kwon APRN CNM [facility]        Today's Diagnoses     AMA (advanced maternal age) multigravida 35+, second trimester    -  1       Follow-ups after your visit        Your next 10 appointments already scheduled     May 09, 2017  8:00 AM CDT   MFM US COMP with [id number]   [facility] - [facility])    [address]  [facility] [address] [phone]           Wear comfortable clothes and leave your valuables at home.            May 09, 2017  8:30 AM CDT   Radiology MD with UR HUBER PEREZ   [facility])    [address]  [facility] [address] [phone]           Please arrive at the time given for your first appointment.  This visit is used internally to schedule the physician's time during your ultrasound.            May 23, 2017  3:00 PM CDT   ESTABLISHED PRENATAL with ERNESTO Ford CNM   [facility] [place]    [address] [place] [address]  [facility] [address] [phone]            Jun 20, 2017  8:30 AM CDT   ESTABLISHED PRENATAL with ERNESTO Garcia CNM   [facility] [place]    [address] [place] [address]  [facility] [address] [phone]              Who to contact     If you have questions or need follow up information about today's clinic visit or your schedule please contact [facility] directly at [phone].  Normal or non-critical lab and imaging results will be communicated to you by MyChart, letter or phone within 4 business days after the clinic has  "received the results. If you do not hear from us within 7 days, please contact the clinic through MediaTrust or phone. If you have a critical or abnormal lab result, we will notify you by phone as soon as possible.  Submit refill requests through MediaTrust or call your pharmacy and they will forward the refill request to us. Please allow 3 business days for your refill to be completed.          Additional Information About Your Visit        DZZOMharMathZee Information     MediaTrust gives you secure access to your electronic health record. If you see a primary care provider, you can also send messages to your care team and make appointments. If you have questions, please call your primary care clinic.  If you do not have a primary care provider, please call [phone] and they will assist you.        Care EveryWhere ID     This is your Care EveryWhere ID. This could be used by other organizations to access your [facility] medical records  [id number]        Your Vitals Were     Pulse Temperature Height Last Period BMI (Body Mass Index)       96 97  F (36.1  C) (Oral) 5' 6\" (1.676 m) 01/02/2017 27.02 kg/m2        Blood Pressure from Last 3 Encounters:   04/21/17 125/83   03/22/17 123/77   03/08/17 113/75    Weight from Last 3 Encounters:   04/21/17 167 lb 6.4 oz (75.9 kg)   03/22/17 168 lb 4.8 oz (76.3 kg)   03/08/17 168 lb (76.2 kg)              We Performed the Following     Alpha fetoprotein maternal screen        Primary Care Provider Office Phone # Fax #    Stephany Noriega [phone]       [facility] [address]  [facility] [address]        Thank you!     Thank you for choosing [facility]  for your care. Our goal is always to provide you with excellent care. Hearing back from our patients is one way we can continue to improve our services. Please take a few minutes to complete the written survey that you may receive in the mail after your visit with us. Thank you!      "        Your Updated Medication List - Protect others around you: Learn how to safely use, store and throw away your medicines at www.disposemymeds.org.          This list is accurate as of: 4/21/17  9:27 AM.  Always use your most recent med list.                   Brand Name Dispense Instructions for use    prenatal multivitamin  plus iron 27-0.8 MG Tabs per tablet      Take 1 tablet by mouth daily       sertraline 50 MG tablet    ZOLOFT    90 tablet    Take 1 tablet (50 mg) by mouth daily

## 2017-04-24 LAB
# FETUSES US: NORMAL
AFP ADJ MOM AMN: 1.04
AFP SERPL-MCNC: 29 NG/ML
AGE - REPORTED: 36
DATING METHOD: NORMAL
DIABETIC AT CONCEPTION: NO
FAMILY MEMBER DISEASES HX: NO
FAMILY MEMBER DISEASES HX: NO
GA METHOD: NORMAL
GA: 15.57 WK
HX OF HEREDITARY DISORDERS: NO
IDDM PATIENT QL: NO
INTEGRATED SCN PATIENT-IMP: NORMAL
LMP START DATE: NORMAL
PREV HX CHROMOSOME ABNORMALITY: NO
SPECIMEN DRAWN SERPL: NORMAL
TWINS: NORMAL

## 2017-05-09 ENCOUNTER — OFFICE VISIT (OUTPATIENT)
Dept: MATERNAL FETAL MEDICINE | Facility: CLINIC | Age: 37
End: 2017-05-09
Attending: OBSTETRICS & GYNECOLOGY
Payer: COMMERCIAL

## 2017-05-09 ENCOUNTER — HOSPITAL ENCOUNTER (OUTPATIENT)
Dept: ULTRASOUND IMAGING | Facility: CLINIC | Age: 37
Discharge: HOME | End: 2017-05-09
Attending: OBSTETRICS & GYNECOLOGY | Admitting: OBSTETRICS & GYNECOLOGY
Payer: COMMERCIAL

## 2017-05-09 DIAGNOSIS — O09.522 AMA (ADVANCED MATERNAL AGE) MULTIGRAVIDA 35+, SECOND TRIMESTER: Primary | ICD-10-CM

## 2017-05-09 DIAGNOSIS — Z36.3 ANTENATAL SCREENING FOR MALFORMATION USING ULTRASONICS: ICD-10-CM

## 2017-05-09 DIAGNOSIS — O09.521 AMA (ADVANCED MATERNAL AGE) MULTIGRAVIDA 35+, FIRST TRIMESTER: ICD-10-CM

## 2017-05-09 PROCEDURE — 76811 OB US DETAILED SNGL FETUS: CPT

## 2017-05-09 NOTE — MR AVS SNAPSHOT
After Visit Summary   2017    Jasmin Benavidez    MRN: [id number]           Patient Information     Date Of Birth          1980        Visit Information        Provider Department      2017 8:30 AM Kevin Moss MD [facility] Maternal Fetal Medicine [facility]        Today's Diagnoses     AMA (advanced maternal age) multigravida 35+, second trimester    -  1     screening for malformation using ultrasonics           Follow-ups after your visit        Your next 10 appointments already scheduled     May 23, 2017  3:00 PM CDT   [facility]    [address] [place] [address]  [facility] [address] [phone]            2017  8:30 AM CDT   ESTABLISHED PRENATAL with ERNESTO Garcia CNM   [facility] ([facility])    [address] [place] [address]  [facility] [address] [phone]              Who to contact     If you have questions or need follow up information about today's clinic visit or your schedule please contact [facility] directly at [phone].  Normal or non-critical lab and imaging results will be communicated to you by MyChart, letter or phone within 4 business days after the clinic has received the results. If you do not hear from us within 7 days, please contact the clinic through MyChart or phone. If you have a critical or abnormal lab result, we will notify you by phone as soon as possible.  Submit refill requests through MeetBall or call your pharmacy and they will forward the refill request to us. Please allow 3 business days for your refill to be completed.          Additional Information About Your Visit        MyChart Information     MeetBall gives you secure access to your electronic health record. If you see a primary care provider, you can also send messages to your care team  and make appointments. If you have questions, please call your primary care clinic.  If you do not have a primary care provider, please call [phone] and they will assist you.        Care EveryWhere ID     This is your Care EveryWhere ID. This could be used by other organizations to access your [facility] medical records  [id number]        Your Vitals Were     Last Period                   01/02/2017            Blood Pressure from Last 3 Encounters:   04/21/17 125/83   03/22/17 123/77   03/08/17 113/75    Weight from Last 3 Encounters:   04/21/17 75.9 kg (167 lb 6.4 oz)   03/22/17 76.3 kg (168 lb 4.8 oz)   03/08/17 76.2 kg (168 lb)              Today, you had the following     No orders found for display       Primary Care Provider Office Phone # Fax #    Stephany Noriega [phone]       [facility] [address]  [facility] [address]        Thank you!     Thank you for choosing [facility]  for your care. Our goal is always to provide you with excellent care. Hearing back from our patients is one way we can continue to improve our services. Please take a few minutes to complete the written survey that you may receive in the mail after your visit with us. Thank you!             Your Updated Medication List - Protect others around you: Learn how to safely use, store and throw away your medicines at www.disposemymeds.org.          This list is accurate as of: 5/9/17  9:26 AM.  Always use your most recent med list.                   Brand Name Dispense Instructions for use    prenatal multivitamin  plus iron 27-0.8 MG Tabs per tablet      Take 1 tablet by mouth daily       sertraline 50 MG tablet    ZOLOFT    90 tablet    Take 1 tablet (50 mg) by mouth daily

## 2017-05-23 ENCOUNTER — PRENATAL OFFICE VISIT (OUTPATIENT)
Dept: MIDWIFE SERVICES | Facility: CLINIC | Age: 37
End: 2017-05-23
Payer: COMMERCIAL

## 2017-05-23 VITALS
SYSTOLIC BLOOD PRESSURE: 117 MMHG | DIASTOLIC BLOOD PRESSURE: 71 MMHG | WEIGHT: 172 LBS | OXYGEN SATURATION: 100 % | HEART RATE: 56 BPM | BODY MASS INDEX: 27.76 KG/M2

## 2017-05-23 DIAGNOSIS — O09.522 AMA (ADVANCED MATERNAL AGE) MULTIGRAVIDA 35+, SECOND TRIMESTER: Primary | ICD-10-CM

## 2017-05-23 PROCEDURE — 99207 ZZC PRENATAL VISIT: CPT | Performed by: ADVANCED PRACTICE MIDWIFE

## 2017-05-23 NOTE — MR AVS SNAPSHOT
After Visit Summary   5/23/2017    Jasmin Benavidez    MRN: [id number]           Patient Information     Date Of Birth          1980        Visit Information        Provider Department      5/23/2017 3:00 PM Henok Villa APRN [place] [facility]        Today's Diagnoses     AMA (advanced maternal age) multigravida 35+, second trimester    -  1       Follow-ups after your visit        Your next 10 appointments already scheduled     Jun 21, 2017  8:15 AM CDT   ESTABLISHED PRENATAL with ERNESTO Garcia CNM   [facility] [place]    [address] [place] [address] [phone]              Who to contact     If you have questions or need follow up information about today's clinic visit or your schedule please contact [facility] directly at [phone].  Normal or non-critical lab and imaging results will be communicated to you by MyChart, letter or phone within 4 business days after the clinic has received the results. If you do not hear from us within 7 days, please contact the clinic through K-PAX Pharmaceuticalshart or phone. If you have a critical or abnormal lab result, we will notify you by phone as soon as possible.  Submit refill requests through Greenplum [employer] or call your pharmacy and they will forward the refill request to us. Please allow 3 business days for your refill to be completed.          Additional Information About Your Visit        MyChart Information     Green[employer] Software gives you secure access to your electronic health record. If you see a primary care provider, you can also send messages to your care team and make appointments. If you have questions, please call your primary care clinic.  If you do not have a primary care provider, please call [phone] and they will assist you.        Care EveryWhere ID     This is your Care EveryWhere ID. This could be used by other organizations to access your [place]  medical records  [id number]        Your Vitals Were     Pulse Last Period Pulse Oximetry Breastfeeding? BMI (Body Mass Index)       56 01/02/2017 100% No 27.76 kg/m2        Blood Pressure from Last 3 Encounters:   05/23/17 117/71   04/21/17 125/83   03/22/17 123/77    Weight from Last 3 Encounters:   05/23/17 172 lb (78 kg)   04/21/17 167 lb 6.4 oz (75.9 kg)   03/22/17 168 lb 4.8 oz (76.3 kg)              Today, you had the following     No orders found for display       Primary Care Provider Office Phone # Fax #    Stephany Noriega [phone]       [facility] [address] [facility] [address]        Thank you!     Thank you for choosing [facility]  for your care. Our goal is always to provide you with excellent care. Hearing back from our patients is one way we can continue to improve our services. Please take a few minutes to complete the written survey that you may receive in the mail after your visit with us. Thank you!             Your Updated Medication List - Protect others around you: Learn how to safely use, store and throw away your medicines at www.disposemymeds.org.          This list is accurate as of: 5/23/17  8:53 PM.  Always use your most recent med list.                   Brand Name Dispense Instructions for use    prenatal multivitamin  plus iron 27-0.8 MG Tabs per tablet      Take 1 tablet by mouth daily       sertraline 50 MG tablet    ZOLOFT    90 tablet    Take 1 tablet (50 mg) by mouth daily

## 2017-05-24 NOTE — PROGRESS NOTES
MFM US was WNL for anatomy.  Minimal fetal movement.  Discussed symptoms and side effects of pregnancy sooner with 2nd including  ctx.  ASSESSMENT: 20w1d  PLAN: RTC in 4 weeks for GCT testing.     MARY

## 2017-06-21 ENCOUNTER — PRENATAL OFFICE VISIT (OUTPATIENT)
Dept: MIDWIFE SERVICES | Facility: CLINIC | Age: 37
End: 2017-06-21
Payer: COMMERCIAL

## 2017-06-21 VITALS
WEIGHT: 177.9 LBS | SYSTOLIC BLOOD PRESSURE: 115 MMHG | DIASTOLIC BLOOD PRESSURE: 76 MMHG | HEART RATE: 69 BPM | OXYGEN SATURATION: 100 % | BODY MASS INDEX: 28.71 KG/M2 | TEMPERATURE: 97.6 F

## 2017-06-21 DIAGNOSIS — Z34.82 ENCOUNTER FOR SUPERVISION OF OTHER NORMAL PREGNANCY IN SECOND TRIMESTER: Primary | ICD-10-CM

## 2017-06-21 PROCEDURE — 99207 ZZC PRENATAL VISIT: CPT | Performed by: ADVANCED PRACTICE MIDWIFE

## 2017-06-21 NOTE — PROGRESS NOTES
Overall feeling well, is moving and closing on her house next week.  Unable to stay to do GCT today, will RTC in 3 wks to do GCT and CNM visit.  States she had vaginal bleeding 3 days ago, felt like she had overextended that day.  Noted bright red blood in toilet bowl with urination.  Denies cramping or pain with it, has been feeling regular fetal movement so felt like everything was okay.  Rh +, no rhogam needed. Placenta posterior without evidence of previa.  Encouraged if bleeding occurs again to call and have U/S.  RTC 3 wks JR

## 2017-06-21 NOTE — MR AVS SNAPSHOT
After Visit Summary   6/21/2017    Jasmin Benavidez    MRN: [id number]           Patient Information     Date Of Birth          1980        Visit Information        Provider Department      6/21/2017 8:15 AM Anabela Rodney APRN CNM [facility]        Today's Diagnoses     Encounter for supervision of other normal pregnancy in second trimester    -  1       Follow-ups after your visit        Your next 10 appointments already scheduled     Jul 10, 2017 10:15 AM CDT   LAB with RD LAB   [facility] ([facility])    [address] [place] [address] [phone]           Patient must bring picture ID.  Patient should be prepared to give a urine specimen  Please do not eat 10-12 hours before your appointment if you are coming in fasting for labs on lipids, cholesterol, or glucose (sugar).  Pregnant women should follow their Care Team instructions. Water with medications is okay. Do not drink coffee or other fluids.   If you have concerns about taking  your medications, please ask at office or if scheduling via Angie's List, send a message by clicking on Secure Messaging, Message Your Care Team.            Jul 10, 2017 10:30 AM CDT   ESTABLISHED PRENATAL with ERNESTO Gambino CNM   [facility] ([facility])    [address] [place] [address] [phone]              Future tests that were ordered for you today     Open Future Orders        Priority Expected Expires Ordered    Glucose tolerance gest screen 1 hour Routine  6/21/2018 6/21/2017    OB hemoglobin Routine  6/21/2018 6/21/2017            Who to contact     If you have questions or need follow up information about today's clinic visit or your schedule please contact [facility] directly at [phone].  Normal or non-critical lab and imaging results will be communicated to you by MyChart, letter  or phone within 4 business days after the clinic has received the results. If you do not hear from us within 7 days, please contact the clinic through Cyanogen or phone. If you have a critical or abnormal lab result, we will notify you by phone as soon as possible.  Submit refill requests through Cyanogen or call your pharmacy and they will forward the refill request to us. Please allow 3 business days for your refill to be completed.          Additional Information About Your Visit        "SmartTurn[employer]"harFreeze Tag Information     Cyanogen gives you secure access to your electronic health record. If you see a primary care provider, you can also send messages to your care team and make appointments. If you have questions, please call your primary care clinic.  If you do not have a primary care provider, please call [phone] and they will assist you.        Care EveryWhere ID     This is your Care EveryWhere ID. This could be used by other organizations to access your [place] medical records  [id number]        Your Vitals Were     Pulse Temperature Last Period Pulse Oximetry BMI (Body Mass Index)       69 97.6  F (36.4  C) (Oral) 01/02/2017 100% 28.71 kg/m2        Blood Pressure from Last 3 Encounters:   06/21/17 115/76   05/23/17 117/71   04/21/17 125/83    Weight from Last 3 Encounters:   06/21/17 177 lb 14.4 oz (80.7 kg)   05/23/17 172 lb (78 kg)   04/21/17 167 lb 6.4 oz (75.9 kg)               Primary Care Provider Office Phone # Fax #    Stephany Francisca Noriega [phone]       [facility] [address]  [facility] [address]        Equal Access to Services     NAGA VANEGAS AH: Hadii rubina fernandes Sokristyn, waaxda luqadaha, qaybta kaalmalito oh. So [facility] [phone].    ATENCIÓN: Si habla español, tiene a gay disposición servicios gratuitos de asistencia lingüística. Cayla al [phone].    We comply with applicable federal civil rights laws and  [place] laws. We do not discriminate on the basis of race, color, national origin, age, disability sex, sexual orientation or gender identity.            Thank you!     Thank you for choosing [facility]  for your care. Our goal is always to provide you with excellent care. Hearing back from our patients is one way we can continue to improve our services. Please take a few minutes to complete the written survey that you may receive in the mail after your visit with us. Thank you!             Your Updated Medication List - Protect others around you: Learn how to safely use, store and throw away your medicines at www.disposemymeds.org.          This list is accurate as of: 6/21/17  9:03 AM.  Always use your most recent med list.                   Brand Name Dispense Instructions for use Diagnosis    prenatal multivitamin  plus iron 27-0.8 MG Tabs per tablet      Take 1 tablet by mouth daily        sertraline 50 MG tablet    ZOLOFT    90 tablet    Take 1 tablet (50 mg) by mouth daily    Major depressive disorder, single episode, moderate (H)

## 2017-07-10 ENCOUNTER — PRENATAL OFFICE VISIT (OUTPATIENT)
Dept: MIDWIFE SERVICES | Facility: CLINIC | Age: 37
End: 2017-07-10
Payer: COMMERCIAL

## 2017-07-10 VITALS
HEART RATE: 66 BPM | TEMPERATURE: 98.4 F | BODY MASS INDEX: 29.05 KG/M2 | SYSTOLIC BLOOD PRESSURE: 115 MMHG | DIASTOLIC BLOOD PRESSURE: 76 MMHG | WEIGHT: 180 LBS

## 2017-07-10 DIAGNOSIS — Z33.1 PREGNANCY, INCIDENTAL: Primary | ICD-10-CM

## 2017-07-10 LAB
GLUCOSE 1H P 50 G GLC PO SERPL-MCNC: 98 MG/DL (ref 60–129)
HGB BLD-MCNC: 10.8 G/DL (ref 11.7–15.7)

## 2017-07-10 PROCEDURE — 00000218 ZZHCL STATISTIC OBHBG - HEMOGLOBIN: Performed by: ADVANCED PRACTICE MIDWIFE

## 2017-07-10 PROCEDURE — 36415 COLL VENOUS BLD VENIPUNCTURE: CPT | Performed by: ADVANCED PRACTICE MIDWIFE

## 2017-07-10 PROCEDURE — 82950 GLUCOSE TEST: CPT | Performed by: ADVANCED PRACTICE MIDWIFE

## 2017-07-10 PROCEDURE — 99207 ZZC PRENATAL VISIT: CPT | Performed by: ADVANCED PRACTICE MIDWIFE

## 2017-07-10 NOTE — PROGRESS NOTES
27w0d  1 hour GCT and hgb  today.  Feels well, has small varicose vein behind left knee, may use vitamin E 400 IU if painful.  Attempt to elevate and if needed may need compression socks but due to it being summer pt does not want to wear them.  rtc in 4 weeks claudia

## 2017-07-10 NOTE — MR AVS SNAPSHOT
After Visit Summary   7/10/2017    Jasmin Benavidez    MRN: [id number]           Patient Information     Date Of Birth          1980        Visit Information        Provider Department      7/10/2017 10:30 AM Francesca Horton APRN [place] [facility]        Today's Diagnoses     Pregnancy, incidental    -  1       Follow-ups after your visit        Follow-up notes from your care team     Return in about 4 weeks (around 8/7/2017) for pv.      Who to contact     If you have questions or need follow up information about today's clinic visit or your schedule please contact [facility] directly at [phone].  Normal or non-critical lab and imaging results will be communicated to you by MyChart, letter or phone within 4 business days after the clinic has received the results. If you do not hear from us within 7 days, please contact the clinic through SunLinkhart or phone. If you have a critical or abnormal lab result, we will notify you by phone as soon as possible.  Submit refill requests through SmartWatch [employer] & Sound or call your pharmacy and they will forward the refill request to us. Please allow 3 business days for your refill to be completed.          Additional Information About Your Visit        MyChart Information     [employer] gives you secure access to your electronic health record. If you see a primary care provider, you can also send messages to your care team and make appointments. If you have questions, please call your primary care clinic.  If you do not have a primary care provider, please call [phone] and they will assist you.        Care EveryWhere ID     This is your Care EveryWhere ID. This could be used by other organizations to access your [facility] medical records  [id number]        Your Vitals Were     Pulse Temperature Last Period BMI (Body Mass Index)          66 98.4  F (36.9  C) (Oral) 01/02/2017 29.05 kg/m2         Blood Pressure from Last 3 Encounters:    07/10/17 115/76   06/21/17 115/76   05/23/17 117/71    Weight from Last 3 Encounters:   07/10/17 180 lb (81.6 kg)   06/21/17 177 lb 14.4 oz (80.7 kg)   05/23/17 172 lb (78 kg)              We Performed the Following     Glucose tolerance gest screen 1 hour     OB hemoglobin        Primary Care Provider Office Phone # Fax #    Stephany Noriega [phone]       [facility] [address]  [facility] [place] [facility] [address]        Equal Access to Services     [facility] : Hadii aad ku hadasho Soomaali, waaxda luqadaha, qaybta kaalmada adetommyyakunal, lito gibson . So [facility] [phone].    ATENCIÓN: Si habla español, tiene a gay disposición servicios gratuitos de asistencia lingüística. [facility] [phone].    We comply with applicable federal civil rights laws and [place] laws. We do not discriminate on the basis of race, color, national origin, age, disability sex, sexual orientation or gender identity.            Thank you!     Thank you for choosing [facility]  for your care. Our goal is always to provide you with excellent care. Hearing back from our patients is one way we can continue to improve our services. Please take a few minutes to complete the written survey that you may receive in the mail after your visit with us. Thank you!             Your Updated Medication List - Protect others around you: Learn how to safely use, store and throw away your medicines at www.disposemymeds.org.          This list is accurate as of: 7/10/17 10:54 AM.  Always use your most recent med list.                   Brand Name Dispense Instructions for use Diagnosis    prenatal multivitamin  plus iron 27-0.8 MG Tabs per tablet      Take 1 tablet by mouth daily        sertraline 50 MG tablet    ZOLOFT    90 tablet    Take 1 tablet (50 mg) by mouth daily    Major depressive disorder, single episode, moderate (H)

## 2017-08-09 ENCOUNTER — PRENATAL OFFICE VISIT (OUTPATIENT)
Dept: MIDWIFE SERVICES | Facility: CLINIC | Age: 37
End: 2017-08-09
Payer: COMMERCIAL

## 2017-08-09 VITALS
HEIGHT: 67 IN | DIASTOLIC BLOOD PRESSURE: 71 MMHG | OXYGEN SATURATION: 98 % | TEMPERATURE: 98 F | SYSTOLIC BLOOD PRESSURE: 114 MMHG | HEART RATE: 64 BPM | WEIGHT: 190.1 LBS | BODY MASS INDEX: 29.84 KG/M2

## 2017-08-09 DIAGNOSIS — O09.522 AMA (ADVANCED MATERNAL AGE) MULTIGRAVIDA 35+, SECOND TRIMESTER: Primary | ICD-10-CM

## 2017-08-09 PROCEDURE — 99207 ZZC PRENATAL VISIT: CPT | Performed by: ADVANCED PRACTICE MIDWIFE

## 2017-08-09 NOTE — PROGRESS NOTES
Feeling well, no concerns. Baby is very active. No regular contractions, LOF or bleeding. Mom passed in May and just now getting to a point where they don't have any other obligations/trips/plans so able to settle in and prepare for baby. Settling in to new house. Feels stable on Zoloft 50mg. RTC in 2 weeks. MML

## 2017-08-09 NOTE — NURSING NOTE
"Chief Complaint   Patient presents with     Prenatal Care     31+2       Initial /71  Pulse 64  Temp 98  F (36.7  C) (Oral)  Ht 5' 6.75\" (1.695 m)  Wt 190 lb 1.6 oz (86.2 kg)  LMP 2017  SpO2 98%  BMI 30 kg/m2 Estimated body mass index is 30 kg/(m^2) as calculated from the following:    Height as of this encounter: 5' 6.75\" (1.695 m).    Weight as of this encounter: 190 lb 1.6 oz (86.2 kg).  BP completed using cuff size: regular        The following HM Due: NONE      The following patient reported/Care Every where data was sent to:  P ABSTRACT QUALITY INITIATIVES [83972]  NA     patient has appointment for today    Elise MURRELL               "

## 2017-08-09 NOTE — MR AVS SNAPSHOT
After Visit Summary   8/9/2017    Jasmin Benavidez    MRN: [id number]           Patient Information     Date Of Birth          1980        Visit Information        Provider Department      8/9/2017 8:30 AM Karis Winter APRN CNM [facility]        Today's Diagnoses     AMA (advanced maternal age) multigravida 35+, second trimester    -  1       Follow-ups after your visit        Your next 10 appointments already scheduled     Aug 23, 2017  9:00 AM BALJITT   ESTABLISHED PRENATAL with ERNESTO Koehler CNM   [facility] [place]    [address] [place] [address] [phone]              Who to contact     If you have questions or need follow up information about today's clinic visit or your schedule please contact [facility] directly at [phone].  Normal or non-critical lab and imaging results will be communicated to you by MyChart, letter or phone within 4 business days after the clinic has received the results. If you do not hear from us within 7 days, please contact the clinic through Twistlehart or phone. If you have a critical or abnormal lab result, we will notify you by phone as soon as possible.  Submit refill requests through [employer] or call your pharmacy and they will forward the refill request to us. Please allow 3 business days for your refill to be completed.          Additional Information About Your Visit        MyChart Information     [employer] gives you secure access to your electronic health record. If you see a primary care provider, you can also send messages to your care team and make appointments. If you have questions, please call your primary care clinic.  If you do not have a primary care provider, please call [phone] and they will assist you.        Care EveryWhere ID     This is your Care EveryWhere ID. This could be used by other organizations to access your  "[place] medical records  [id number]        Your Vitals Were     Pulse Temperature Height Last Period Pulse Oximetry BMI (Body Mass Index)    64 98  F (36.7  C) (Oral) 5' 6.75\" (1.695 m) 01/02/2017 98% 30 kg/m2       Blood Pressure from Last 3 Encounters:   08/09/17 114/71   07/10/17 115/76   06/21/17 115/76    Weight from Last 3 Encounters:   08/09/17 190 lb 1.6 oz (86.2 kg)   07/10/17 180 lb (81.6 kg)   06/21/17 177 lb 14.4 oz (80.7 kg)              Today, you had the following     No orders found for display       Primary Care Provider Office Phone # Fax #    Stephany Noriega [phone] [address]  [facility] [address]        Equal Access to Services     [facility]: Hadii aad ku hadasho Sokristyn, waaxda luqadaha, qaybta kaalmada adeegyada, lito gibson . So [facility] [phone].    ATENCIÓN: Si habla español, tiene a gay disposición servicios gratuitos de asistencia lingüística. Llame al [phone].    We comply with applicable federal civil rights laws and [place] laws. We do not discriminate on the basis of race, color, national origin, age, disability sex, sexual orientation or gender identity.            Thank you!     Thank you for choosing [facility]  for your care. Our goal is always to provide you with excellent care. Hearing back from our patients is one way we can continue to improve our services. Please take a few minutes to complete the written survey that you may receive in the mail after your visit with us. Thank you!             Your Updated Medication List - Protect others around you: Learn how to safely use, store and throw away your medicines at www.disposemymeds.org.          This list is accurate as of: 8/9/17  8:57 AM.  Always use your most recent med list.                   Brand Name Dispense Instructions for use Diagnosis    prenatal multivitamin plus iron 27-0.8 MG Tabs per tablet      Take 1 tablet by " mouth daily        sertraline 50 MG tablet    ZOLOFT    90 tablet    Take 1 tablet (50 mg) by mouth daily    Major depressive disorder, single episode, moderate (H)

## 2017-08-23 ENCOUNTER — PRENATAL OFFICE VISIT (OUTPATIENT)
Dept: MIDWIFE SERVICES | Facility: CLINIC | Age: 37
End: 2017-08-23
Payer: COMMERCIAL

## 2017-08-23 VITALS
HEART RATE: 74 BPM | DIASTOLIC BLOOD PRESSURE: 71 MMHG | TEMPERATURE: 97.4 F | BODY MASS INDEX: 29.98 KG/M2 | SYSTOLIC BLOOD PRESSURE: 109 MMHG | WEIGHT: 190 LBS

## 2017-08-23 DIAGNOSIS — Z34.83 ENCOUNTER FOR SUPERVISION OF OTHER NORMAL PREGNANCY IN THIRD TRIMESTER: Primary | ICD-10-CM

## 2017-08-23 DIAGNOSIS — Z23 NEED FOR TDAP VACCINATION: ICD-10-CM

## 2017-08-23 PROCEDURE — 99207 ZZC PRENATAL VISIT: CPT | Performed by: ADVANCED PRACTICE MIDWIFE

## 2017-08-23 PROCEDURE — 90715 TDAP VACCINE 7 YRS/> IM: CPT | Performed by: ADVANCED PRACTICE MIDWIFE

## 2017-08-23 PROCEDURE — 90471 IMMUNIZATION ADMIN: CPT | Performed by: ADVANCED PRACTICE MIDWIFE

## 2017-08-23 NOTE — PROGRESS NOTES
33w2d  Feeling well. Reports good fetal movement. Denies leaking of fluid, vaginal bleeding, regular uterine contractions, headache or other concerns.  RTC in 2 wks NACHO

## 2017-08-23 NOTE — MR AVS SNAPSHOT
After Visit Summary   8/23/2017    Jasmin Benavidez    MRN: [id number]           Patient Information     Date Of Birth          1980        Visit Information        Provider Department      8/23/2017 9:00 AM Kerri Hills APRN CNM [facility]        Today's Diagnoses     Encounter for supervision of other normal pregnancy in third trimester    -  1    Need for Tdap vaccination           Follow-ups after your visit        Your next 10 appointments already scheduled     Sep 06, 2017  9:00 AM CDT   ESTABLISHED PRENATAL with ERNESTO Herman CNM   [facility] ([facility]    [address] [place] [address] [phone]              Who to contact     If you have questions or need follow up information about today's clinic visit or your schedule please contact [facility] directly at [phone].  Normal or non-critical lab and imaging results will be communicated to you by MyChart, letter or phone within 4 business days after the clinic has received the results. If you do not hear from us within 7 days, please contact the clinic through Audio Network[employer] or phone. If you have a critical or abnormal lab result, we will notify you by phone as soon as possible.  Submit refill requests through PlastiPure or call your pharmacy and they will forward the refill request to us. Please allow 3 business days for your refill to be completed.          Additional Information About Your Visit        MyChart Information     [employer] gives you secure access to your electronic health record. If you see a primary care provider, you can also send messages to your care team and make appointments. If you have questions, please call your primary care clinic.  If you do not have a primary care provider, please call [phone] and they will assist you.        Care EveryWhere ID     This is your Care EveryWhere ID. This could be  used by other organizations to access your [place] medical records  [id number]        Your Vitals Were     Pulse Temperature Last Period BMI (Body Mass Index)          74 97.4  F (36.3  C) (Oral) 01/02/2017 29.98 kg/m2         Blood Pressure from Last 3 Encounters:   08/23/17 109/71   08/09/17 114/71   07/10/17 115/76    Weight from Last 3 Encounters:   08/23/17 190 lb (86.2 kg)   08/09/17 190 lb 1.6 oz (86.2 kg)   07/10/17 180 lb (81.6 kg)              We Performed the Following     TDAP VACCINE (ADACEL)     VACCINE ADMINISTRATION, INITIAL        Primary Care Provider Office Phone # Fax #    Stephany Noriega [phone] [address]  [facility] [address]        Equal Access to Services     [facility] : Hadii rubina fernandes Sokristyn, waaxda sudhirqshannon, qaybta kaalmakunal coburn, lito gibson . So [facility] [phone].    ATENCIÓN: Si habla español, tiene a gay disposición servicios gratuitos de asistencia lingüística. Llame al [phone].    We comply with applicable federal civil rights laws and [place] laws. We do not discriminate on the basis of race, color, national origin, age, disability sex, sexual orientation or gender identity.            Thank you!     Thank you for choosing [facility]  for your care. Our goal is always to provide you with excellent care. Hearing back from our patients is one way we can continue to improve our services. Please take a few minutes to complete the written survey that you may receive in the mail after your visit with us. Thank you!             Your Updated Medication List - Protect others around you: Learn how to safely use, store and throw away your medicines at www.disposemymeds.org.          This list is accurate as of: 8/23/17 12:39 PM.  Always use your most recent med list.                   Brand Name Dispense Instructions for use Diagnosis    prenatal multivitamin plus iron 27-0.8 MG Tabs per  tablet      Take 1 tablet by mouth daily        sertraline 50 MG tablet    ZOLOFT    90 tablet    Take 1 tablet (50 mg) by mouth daily    Major depressive disorder, single episode, moderate (H)

## 2017-09-05 ENCOUNTER — TELEPHONE (OUTPATIENT)
Dept: MIDWIFE SERVICES | Facility: CLINIC | Age: 37
End: 2017-09-05

## 2017-09-05 ENCOUNTER — NURSE TRIAGE (OUTPATIENT)
Dept: NURSING | Facility: CLINIC | Age: 37
End: 2017-09-05

## 2017-09-06 ENCOUNTER — PRENATAL OFFICE VISIT (OUTPATIENT)
Dept: MIDWIFE SERVICES | Facility: CLINIC | Age: 37
End: 2017-09-06
Payer: COMMERCIAL

## 2017-09-06 VITALS
WEIGHT: 191 LBS | SYSTOLIC BLOOD PRESSURE: 120 MMHG | BODY MASS INDEX: 30.14 KG/M2 | HEART RATE: 68 BPM | DIASTOLIC BLOOD PRESSURE: 78 MMHG | TEMPERATURE: 98.2 F

## 2017-09-06 DIAGNOSIS — Z34.83 ENCOUNTER FOR SUPERVISION OF OTHER NORMAL PREGNANCY IN THIRD TRIMESTER: Primary | ICD-10-CM

## 2017-09-06 PROCEDURE — 99207 ZZC PRENATAL VISIT: CPT | Performed by: NURSE PRACTITIONER

## 2017-09-06 NOTE — TELEPHONE ENCOUNTER
"35 wks, HUNTER 10/9,   OB provider - Francitas ZULEMA. C/o lower bilat abdominal pain for about the past 5 hours tonight. This pain is constant, does not come and go like contractions. Feels sharp, \"like pushing\"  \"like the baby is sideways\". Rates pain 5-6 out of 10. Pain increased when pt stands up straight, when she lifts her 2 year old or when she rolls side to side while in bed. No vaginal bleeding or gush/leakage of fluid. No fever. No vomiting. No lightheadedness. Baby moving like usually does. Has OB appt tomorrow. On-call Karina Mayer CNM paged @8:50pm to call pt at [phone]. Advised pt to call back to FNA if she does not hear from on-call in next 20-30 minutes. Jeni Jade RN/FNA    Reason for Disposition    MODERATE-SEVERE abdominal pain (e.g., interferes with normal activities, awakens from sleep)    Additional Information    Negative: Passed out (i.e., lost consciousness, collapsed and was not responding)    Negative: Shock suspected (e.g., cold/pale/clammy skin, too weak to stand, low BP, rapid pulse)    Negative: Difficult to awaken or acting confused  (e.g., disoriented, slurred speech)    Negative: [1] SEVERE abdominal pain (e.g., excruciating) AND [2] constant AND [3] present > 1 hour    Negative: SEVERE vaginal bleeding (e.g., continuous red blood from vagina, or large blood clots)    Negative: Sounds like a life-threatening emergency to the triager    Negative: Followed an abdomen (stomach) injury    Negative: [1] Having contractions or other symptoms of labor AND [2] >= 37 weeks pregnant (i.e., term pregnancy)    Negative: [1] Having contractions or other symptoms of labor AND [2] < 37 weeks pregnant (i.e., )    Negative: [1] Abdominal pain AND [2] pregnant < 20 weeks    Negative: [1] Vomiting AND [2] contains red blood or black (\"coffee ground\") material  (Exception: few red streaks in vomit that only happened once)    Protocols used: PREGNANCY - ABDOMINAL PAIN GREATER THAN 20 " WEEKS EGA-ADULT-AH

## 2017-09-06 NOTE — MR AVS SNAPSHOT
After Visit Summary   2017    Jasmin Benavidez    MRN: [id number]           Patient Information     Date Of Birth          1980        Visit Information        Provider Department      2017 9:00 AM Tammy Goodrich APRN [facility]        Today's Diagnoses     Encounter for supervision of other normal pregnancy in third trimester    -  1      Care Instructions    SIGNS OF  LABOR    Labor is  if it happens more than three weeks before your due date.    It can be hard to know if you are in labor, since the symptoms can be like the normal feelings of pregnancy.  Often, the only difference is the symptoms increase or they don't go away.     Signs of  labor can include:      Contractions which can feel like period cramps or gas pain.  You may feel it in the lower part of your abdomen, in your back, or as a pressure feeling in your bottom.  It is often regular, coming every 5 or 10 minutes, and  lasting about 30-60 seconds. Some contractions are normal during pregnancy (Goldsmith jarrett contractions) but if you are feeling more than 5-6 in one hour that is NOT normal    If this occurs empty your bladder, then drink 2-3 glasses of water, eat a snack, and lay down on your left side. Put your hand on your abdomen to count the contractions.  If after one hour of resting you have still had 5-6 contractions call your clinic right away.      If you feel a pop, gush, or trickle of fluid it may mean that your bag of water has broken and you should contact the clinic       You may also experience loose stools and/or rectal pressure       Listen to your body, if something doesn't seem right please call us at the clinic    Risk Factors      Previous  delivery    Bacterial Vaginosis- if you notice a fishy smell to your discharge or experience vaginal itching/discomfort you should be evaluated for infection    Smoking    Drug abuse    Adolescent (teen) pregnancy  "or advanced maternal age (AMA) age 35 and over    Dehydration (this may not cause  labor but it can cause contractions)    If you think you are in  labor we may do some lab testing in the clinic or send you to the hospital for evaluation    Please call us if you are concerned you are in  labor.    PREECLAMPSIA SIGNS AND SYMPTOMS    Preeclampsia is a dangerous condition that some women develop in the second half of pregnancy. It can also begin after the baby is born.  Preeclampsia causes high blood pressure and can cause problems with many organ systems in your body.  It can also affect the growth of your baby. The exact cause of preeclampsia is unknown, however, there are signs and symptoms to watch for:    -A bad headache that doesn't improve with Tylenol  -Visual changes such as spots, flashes of light, blurry vision  -Pain in the upper right part of your abdomen, especially under the ribs that doesn't go away  -Nausea and/or vomiting  -Feeling extremely tired  -Yellowing of the skin and/or eyes  -Feeling \"not quite right\" or that something is wrong  -An extreme amount of swelling (some swelling in pregnancy is very normal)    If your midwife feels that you are developing preeclampsia, you will have lab tests drawn and will be monitored very closely.     If you are experiencing anyof these symptoms, call.    GROUP B STREP    Group B Strep (GBS) is a common bacteria that is sometimes found in the vagina, urinary tract or rectum.  It is not harmful typically to adults but can cause serious illness in newborns.  It occasionally is passed from mother to baby during birth.   It is important to test in pregnancy.  When a woman is found to be positive for GBS, either at the first prenatal visit or by taking a culture at 36 weeks, treatment will be offered to reduce the chance of spreading the bacteria to the baby.       Treatment consists of either oral antibiotics early in pregnancy or antibiotics " given by IV during labor if testing is positive at 36 weeks.      Even without treatment the baby rarely (1-2% of the time) gets infected.  With treatment the baby almost never gets infected.       There really isn't anything you can do to keep from getting or being positive for GBS.  It isn't sexually transmitted and there are no symptoms if you are positive.       Your midwife will discuss your results with you and make recommendations for treatment.    Round Ligament Pain    Pregnancy can entail many normal discomforts. One of those discomforts may be round ligament pain. Round ligament pain occurs as your uterus and your baby grow and the muscles begin to stretch more in your abdomen. Round ligament pain is normal and not dangerous but can be very uncomfortable      Round ligament pain is typically described as an unpleasant sensation that ranges from a sharp knifelike pain to dull intermittent pain in the lower abdominal/suprapubic area of a pregnant woman      Virtually all pregnant women will experience this pain at some point during their pregnancies      It typically manifests between 16 and 20 weeks of pregnancy and can be incredibly bothersome especially for women who remain very active during their pregnancies       Please discuss with your midwife if you are having this type of pain; sometimes the pain can be associated with other medical conditions so it is important for us to assess you just to make sure    Comfort measures    There are certain things you can do to cope with round ligament pain and ease the discomfort you are having      Pregnancy support belt      Tylenol      Hot/ice packs      Baths       Exercise such as yoga and swimming that help stretch muscles      Prenatal massage      Reflexology to waist and pelvic joints       Positioning such as side-lying and hands and knees, make sure your abdomen is  well supported with pillows while doing different positions                Follow-ups  after your visit        Follow-up notes from your care team     Return in about 1 week (around 9/13/2017).      Your next 10 appointments already scheduled     Sep 13, 2017  3:30 PM CDT   ESTABLISHED PRENATAL with ERNESTO Koehler CNM   [facility] ([facility])    [address] [place] [address] [address]  [facility] [id number] [phone]            Sep 21, 2017  3:00 PM CDT   ESTABLISHED PRENATAL with Karina Mayer CNM   [facility] ([facility])    [address] [place] [address] [address]  [facility] [id number] [phone]              Who to contact     If you have questions or need follow up information about today's clinic visit or your schedule please contact [facility] directly at [phone].  Normal or non-critical lab and imaging results will be communicated to you by MyChart, letter or phone within 4 business days after the clinic has received the results. If you do not hear from us within 7 days, please contact the clinic through Simple Emotionhart or phone. If you have a critical or abnormal lab result, we will notify you by phone as soon as possible.  Submit refill requests through [employer] or call your pharmacy and they will forward the refill request to us. Please allow 3 business days for your refill to be completed.          Additional Information About Your Visit        MyChart Information     [employer] gives you secure access to your electronic health record. If you see a primary care provider, you can also send messages to your care team and make appointments. If you have questions, please call your primary care clinic.  If you do not have a primary care provider, please call [phone] and they will assist you.        Care EveryWhere ID     This is your Care EveryWhere ID. This could be used by other organizations to access your [place] medical records  [id number]        Your Vitals Were     Pulse  Temperature Last Period BMI (Body Mass Index)          68 98.2  F (36.8  C) (Oral) 01/02/2017 30.14 kg/m2         Blood Pressure from Last 3 Encounters:   09/06/17 120/78   08/23/17 109/71   08/09/17 114/71    Weight from Last 3 Encounters:   09/06/17 191 lb (86.6 kg)   08/23/17 190 lb (86.2 kg)   08/09/17 190 lb 1.6 oz (86.2 kg)              Today, you had the following     No orders found for display       Primary Care Provider Office Phone # Fax #    Stephany Noriega [phone] [address]  [facility] [address]        Equal Access to Services     NAGA VANEGAS : Nilo mobleyo Sokristyn, waaxda luqadaha, qaybta kaalmada adeegyada, lito henriquez. So [facility] [phone].    ATENCIÓN: Si habla español, tiene a gay disposición servicios gratuitos de asistencia lingüística. [facility] [phone].    We comply with applicable federal civil rights laws and [place] laws. We do not discriminate on the basis of race, color, national origin, age, disability sex, sexual orientation or gender identity.            Thank you!     Thank you for choosing [facility]  for your care. Our goal is always to provide you with excellent care. Hearing back from our patients is one way we can continue to improve our services. Please take a few minutes to complete the written survey that you may receive in the mail after your visit with us. Thank you!             Your Updated Medication List - Protect others around you: Learn how to safely use, store and throw away your medicines at www.disposemymeds.org.          This list is accurate as of: 9/6/17  9:39 AM.  Always use your most recent med list.                   Brand Name Dispense Instructions for use Diagnosis    prenatal multivitamin plus iron 27-0.8 MG Tabs per tablet      Take 1 tablet by mouth daily        sertraline 50 MG tablet    ZOLOFT    90 tablet    Take 1 tablet (50 mg) by mouth daily    Major  depressive disorder, single episode, moderate (H)

## 2017-09-06 NOTE — PATIENT INSTRUCTIONS
SIGNS OF  LABOR    Labor is  if it happens more than three weeks before your due date.    It can be hard to know if you are in labor, since the symptoms can be like the normal feelings of pregnancy.  Often, the only difference is the symptoms increase or they don't go away.     Signs of  labor can include:      Contractions which can feel like period cramps or gas pain.  You may feel it in the lower part of your abdomen, in your back, or as a pressure feeling in your bottom.  It is often regular, coming every 5 or 10 minutes, and  lasting about 30-60 seconds. Some contractions are normal during pregnancy (Teofilo jarrett contractions) but if you are feeling more than 5-6 in one hour that is NOT normal    If this occurs empty your bladder, then drink 2-3 glasses of water, eat a snack, and lay down on your left side. Put your hand on your abdomen to count the contractions.  If after one hour of resting you have still had 5-6 contractions call your clinic right away.      If you feel a pop, gush, or trickle of fluid it may mean that your bag of water has broken and you should contact the clinic       You may also experience loose stools and/or rectal pressure       Listen to your body, if something doesn't seem right please call us at the clinic    Risk Factors      Previous  delivery    Bacterial Vaginosis- if you notice a fishy smell to your discharge or experience vaginal itching/discomfort you should be evaluated for infection    Smoking    Drug abuse    Adolescent (teen) pregnancy or advanced maternal age (AMA) age 35 and over    Dehydration (this may not cause  labor but it can cause contractions)    If you think you are in  labor we may do some lab testing in the clinic or send you to the hospital for evaluation    Please call us if you are concerned you are in  labor.    PREECLAMPSIA SIGNS AND SYMPTOMS    Preeclampsia is a dangerous condition that some women  "develop in the second half of pregnancy. It can also begin after the baby is born.  Preeclampsia causes high blood pressure and can cause problems with many organ systems in your body.  It can also affect the growth of your baby. The exact cause of preeclampsia is unknown, however, there are signs and symptoms to watch for:    -A bad headache that doesn't improve with Tylenol  -Visual changes such as spots, flashes of light, blurry vision  -Pain in the upper right part of your abdomen, especially under the ribs that doesn't go away  -Nausea and/or vomiting  -Feeling extremely tired  -Yellowing of the skin and/or eyes  -Feeling \"not quite right\" or that something is wrong  -An extreme amount of swelling (some swelling in pregnancy is very normal)    If your midwife feels that you are developing preeclampsia, you will have lab tests drawn and will be monitored very closely.     If you are experiencing anyof these symptoms, call.    GROUP B STREP    Group B Strep (GBS) is a common bacteria that is sometimes found in the vagina, urinary tract or rectum.  It is not harmful typically to adults but can cause serious illness in newborns.  It occasionally is passed from mother to baby during birth.   It is important to test in pregnancy.  When a woman is found to be positive for GBS, either at the first prenatal visit or by taking a culture at 36 weeks, treatment will be offered to reduce the chance of spreading the bacteria to the baby.       Treatment consists of either oral antibiotics early in pregnancy or antibiotics given by IV during labor if testing is positive at 36 weeks.      Even without treatment the baby rarely (1-2% of the time) gets infected.  With treatment the baby almost never gets infected.       There really isn't anything you can do to keep from getting or being positive for GBS.  It isn't sexually transmitted and there are no symptoms if you are positive.       Your midwife will discuss your results " with you and make recommendations for treatment.    Round Ligament Pain    Pregnancy can entail many normal discomforts. One of those discomforts may be round ligament pain. Round ligament pain occurs as your uterus and your baby grow and the muscles begin to stretch more in your abdomen. Round ligament pain is normal and not dangerous but can be very uncomfortable      Round ligament pain is typically described as an unpleasant sensation that ranges from a sharp knifelike pain to dull intermittent pain in the lower abdominal/suprapubic area of a pregnant woman      Virtually all pregnant women will experience this pain at some point during their pregnancies      It typically manifests between 16 and 20 weeks of pregnancy and can be incredibly bothersome especially for women who remain very active during their pregnancies       Please discuss with your midwife if you are having this type of pain; sometimes the pain can be associated with other medical conditions so it is important for us to assess you just to make sure    Comfort measures    There are certain things you can do to cope with round ligament pain and ease the discomfort you are having      Pregnancy support belt      Tylenol      Hot/ice packs      Baths       Exercise such as yoga and swimming that help stretch muscles      Prenatal massage      Reflexology to waist and pelvic joints       Positioning such as side-lying and hands and knees, make sure your abdomen is  well supported with pillows while doing different positions

## 2017-09-06 NOTE — PROGRESS NOTES
Feels pretty good.  States that she did more than usual this weekend and has been feeling more RLP and abdominal muscle discomfort.  Denies dysuria, sharp pain, states pain increases with movement/position changes and resolves with rest.   Fetal Movement: positive, denies loss of fluid/vb, no  contractions  Fetal kick counts/movement reviewed  Reviewed PTL precautions and s/sx of preeclampsia; denies any S&S and aware of what to report  Have car seat Yes  Discussed GBS/cx check at next visit  Counseled on options for pregnancy support belt; patient states she has some at home and will try.    Return to clinic 1 week    Tammy OROZCO CNM

## 2017-09-13 ENCOUNTER — PRENATAL OFFICE VISIT (OUTPATIENT)
Dept: MIDWIFE SERVICES | Facility: CLINIC | Age: 37
End: 2017-09-13
Payer: COMMERCIAL

## 2017-09-13 VITALS
HEART RATE: 70 BPM | BODY MASS INDEX: 30.61 KG/M2 | DIASTOLIC BLOOD PRESSURE: 82 MMHG | WEIGHT: 194 LBS | TEMPERATURE: 96.4 F | SYSTOLIC BLOOD PRESSURE: 121 MMHG

## 2017-09-13 DIAGNOSIS — Z34.83 ENCOUNTER FOR SUPERVISION OF OTHER NORMAL PREGNANCY IN THIRD TRIMESTER: Primary | ICD-10-CM

## 2017-09-13 LAB — HGB BLD-MCNC: 10.9 G/DL (ref 11.7–15.7)

## 2017-09-13 PROCEDURE — 00000218 ZZHCL STATISTIC OBHBG - HEMOGLOBIN: Performed by: ADVANCED PRACTICE MIDWIFE

## 2017-09-13 PROCEDURE — 87186 SC STD MICRODIL/AGAR DIL: CPT | Performed by: ADVANCED PRACTICE MIDWIFE

## 2017-09-13 PROCEDURE — 36415 COLL VENOUS BLD VENIPUNCTURE: CPT | Performed by: ADVANCED PRACTICE MIDWIFE

## 2017-09-13 PROCEDURE — 99207 ZZC PRENATAL VISIT: CPT | Performed by: ADVANCED PRACTICE MIDWIFE

## 2017-09-13 PROCEDURE — 86803 HEPATITIS C AB TEST: CPT | Performed by: ADVANCED PRACTICE MIDWIFE

## 2017-09-13 PROCEDURE — 87653 STREP B DNA AMP PROBE: CPT | Performed by: ADVANCED PRACTICE MIDWIFE

## 2017-09-13 NOTE — PROGRESS NOTES
36w2d  Feeling well. GBS and hgb today. Hep c and waterbirth consent signed. Reports good fetal movement. Denies leaking of fluid, vaginal bleeding, regular uterine contractions, headache or other concerns.  RTC in 1 wk [facility]

## 2017-09-13 NOTE — MR AVS SNAPSHOT
After Visit Summary   9/13/2017    Jasmin Benavidez    MRN: [id number]           Patient Information     Date Of Birth          1980        Visit Information        Provider Department      9/13/2017 3:30 PM Kerri Hills APRN CNM [facility]        Today's Diagnoses     Encounter for supervision of other normal pregnancy in third trimester    -  1       Follow-ups after your visit        Your next 10 appointments already scheduled     Sep 21, 2017  3:00 PM CDT   ESTABLISHED PRENATAL with Karina Mayer CNM   [facility] ([facility])    [address] [place] [address]   [phone]            Sep 28, 2017 10:00 AM CDT   ESTABLISHED PRENATAL with ERNESTO Garcia CNM   [facility] ([facility])    [address] [place] [address]   [phone]            Oct 04, 2017 10:15 AM CDT   ESTABLISHED PRENATAL with ERNESTO Ford CNM   [facility] ([facility])    [address] [place] [address] [phone]            Oct 11, 2017  8:00 AM CDT   ESTABLISHED PRENATAL with ERNESTO Gambino CNM   [facility] ([facility])    [address] [place] [address] [phone]              Who to contact     If you have questions or need follow up information about today's clinic visit or your schedule please contact [facility] directly at [phone].  Normal or non-critical lab and imaging results will be communicated to you by MyChart, letter or phone within 4 business days after the clinic has received the results. If you do not hear from us within 7 days, please contact the clinic through MyChart or phone. If you have a critical or abnormal lab result, we will notify you by phone as soon as possible.  Submit refill  requests through ZEALER or call your pharmacy and they will forward the refill request to us. Please allow 3 business days for your refill to be completed.          Additional Information About Your Visit        CommScopehart Information     ZEALER gives you secure access to your electronic health record. If you see a primary care provider, you can also send messages to your care team and make appointments. If you have questions, please call your primary care clinic.  If you do not have a primary care provider, please call [phone] and they will assist you.        Care EveryWhere ID     This is your Care EveryWhere ID. This could be used by other organizations to access your [facility] medical records  [id number]        Your Vitals Were     Pulse Temperature Last Period BMI (Body Mass Index)          70 96.4  F (35.8  C) 01/02/2017 30.61 kg/m2         Blood Pressure from Last 3 Encounters:   09/13/17 121/82   09/06/17 120/78   08/23/17 109/71    Weight from Last 3 Encounters:   09/13/17 194 lb (88 kg)   09/06/17 191 lb (86.6 kg)   08/23/17 190 lb (86.2 kg)              We Performed the Following     Group B strep PCR     Hepatitis C antibody     OB hemoglobin        Primary Care Provider Office Phone # Fax #    Stephany Noriega [phone] [address]  [facility] [address]        Equal Access to Services     NAGA VANEGAS : Hadii aad ku hadasho Sokristyn, waaxda luqadaha, qaybta kaalmada almas, lito henriquez. So [facility] [phone].    ATENCIÓN: Si habla español, tiene a gay disposición servicios gratuitos de asistencia lingüística. Llame al [phone].    We comply with applicable federal civil rights laws and [place] laws. We do not discriminate on the basis of race, color, national origin, age, disability sex, sexual orientation or gender identity.            Thank you!     Thank you for choosing [facility]  for your care. Our goal is  always to provide you with excellent care. Hearing back from our patients is one way we can continue to improve our services. Please take a few minutes to complete the written survey that you may receive in the mail after your visit with us. Thank you!             Your Updated Medication List - Protect others around you: Learn how to safely use, store and throw away your medicines at www.disposemymeds.org.          This list is accurate as of: 9/13/17  4:27 PM.  Always use your most recent med list.                   Brand Name Dispense Instructions for use Diagnosis    prenatal multivitamin plus iron 27-0.8 MG Tabs per tablet      Take 1 tablet by mouth daily        sertraline 50 MG tablet    ZOLOFT    90 tablet    Take 1 tablet (50 mg) by mouth daily    Major depressive disorder, single episode, moderate (H)

## 2017-09-14 LAB
GP B STREP DNA SPEC QL NAA+PROBE: POSITIVE
HCV AB SERPL QL IA: NONREACTIVE
SPECIMEN SOURCE: ABNORMAL

## 2017-09-15 NOTE — PROGRESS NOTES
Dear Jasmin,    Your test results are attached below. Your test for hepatitis C was negative.     The result of your Group Beta Strep (GBS) test was positive. This means that we recommend that you receive antibiotics when you are in labor to prevent your baby from getting an infection. GBS is common, about 1 in 4 women test positive to GBS in pregnancy. Having GBS should not effect your labor or the way in which you are planning to birth your baby. We can discuss GBS and our recommendations for labor at your next appointment.       Your hemoglobin test shows that your level is low. This means that you have anemia or low iron. Some common symptoms of anemia include feeling tired, being intolerant to activities that you once did easily, feeling short of breath with activity or feeling like your heart is beating faster than normal. It is important to try to increase this level before the birth of your baby.     We recommend eating more foods that are rich in iron. You can look at your food labels to see which foods have the most iron in them and eat more of these foods. Foods that are typically high in iron include red meats, dark green leafy vegetables, beans, and fortified cereals. Cream of wheat hot cereal actually has one of the highest iron contents of any food. We also recommend that you take an iron supplement every day. You can buy these at any pharmacy or drug store.    If you have any questions, please contact me via [email or web address] or you can call our office at [phone].    Kerri Garcia CNM, ZULEMA

## 2017-09-17 LAB
BACTERIA SPEC CULT: ABNORMAL
SPECIMEN SOURCE: ABNORMAL

## 2017-09-21 ENCOUNTER — PRENATAL OFFICE VISIT (OUTPATIENT)
Dept: MIDWIFE SERVICES | Facility: CLINIC | Age: 37
End: 2017-09-21
Payer: COMMERCIAL

## 2017-09-21 VITALS
DIASTOLIC BLOOD PRESSURE: 80 MMHG | OXYGEN SATURATION: 99 % | HEART RATE: 76 BPM | SYSTOLIC BLOOD PRESSURE: 123 MMHG | WEIGHT: 198 LBS | TEMPERATURE: 97.4 F | BODY MASS INDEX: 31.24 KG/M2

## 2017-09-21 DIAGNOSIS — Z34.83 ENCOUNTER FOR SUPERVISION OF OTHER NORMAL PREGNANCY IN THIRD TRIMESTER: ICD-10-CM

## 2017-09-21 DIAGNOSIS — Z23 NEED FOR PROPHYLACTIC VACCINATION AND INOCULATION AGAINST INFLUENZA: Primary | ICD-10-CM

## 2017-09-21 PROCEDURE — 99207 ZZC PRENATAL VISIT: CPT | Performed by: ADVANCED PRACTICE MIDWIFE

## 2017-09-21 PROCEDURE — 90471 IMMUNIZATION ADMIN: CPT | Performed by: ADVANCED PRACTICE MIDWIFE

## 2017-09-21 PROCEDURE — 90686 IIV4 VACC NO PRSV 0.5 ML IM: CPT | Performed by: ADVANCED PRACTICE MIDWIFE

## 2017-09-21 ASSESSMENT — PATIENT HEALTH QUESTIONNAIRE - PHQ9: SUM OF ALL RESPONSES TO PHQ QUESTIONS 1-9: 4

## 2017-09-21 NOTE — PROGRESS NOTES
Injectable Influenza Immunization Documentation    1.  Is the person to be vaccinated sick today?   No    2. Does the person to be vaccinated have an allergy to a component   of the vaccine?   No    3. Has the person to be vaccinated ever had a serious reaction   to influenza vaccine in the past?   No    4. Has the person to be vaccinated ever had Guillain-Barré syndrome?   No    Form completed by pt

## 2017-09-21 NOTE — MR AVS SNAPSHOT
After Visit Summary   9/21/2017    Jasmin Benavidez    MRN: [id number]           Patient Information     Date Of Birth          1980        Visit Information        Provider Department      9/21/2017 3:00 PM Karina Mayer CNM [facility]        Today's Diagnoses     Need for prophylactic vaccination and inoculation against influenza    -  1    Encounter for supervision of other normal pregnancy in third trimester           Follow-ups after your visit        Follow-up notes from your care team     Return in about 1 week (around 9/28/2017) for Prenatal care with ZULEMA.      Your next 10 appointments already scheduled     Sep 28, 2017 10:00 AM CDT   ESTABLISHED PRENATAL with ERNESTO Garcia CNM   [facility] ([facility])    [address] [place] [address] [address]  [facility] [address] [phone]            Oct 04, 2017 10:15 AM CDT   ESTABLISHED PRENATAL with ERNESTO Ford CNM   [facility] ([facility])    [address] [place] [address] [address]  [facility] [id number] [phone]            Oct 11, 2017  8:00 AM CDT   ESTABLISHED PRENATAL with ERNESTO Gambino CNM   [facility] ([facility])    [address] [place] [address] [address]  [facility] [id number] [phone]              Who to contact     If you have questions or need follow up information about today's clinic visit or your schedule please contact [facility] directly at [phone].  Normal or non-critical lab and imaging results will be communicated to you by MyChart, letter or phone within 4 business days after the clinic has received the results. If you do not hear from us within 7 days, please contact the clinic through MyChart or phone. If you have a critical or abnormal lab result, we will notify you by phone as soon as possible.  Submit refill requests through TerraSkyhart or call your  pharmacy and they will forward the refill request to us. Please allow 3 business days for your refill to be completed.          Additional Information About Your Visit        MyChart Information     Viki gives you secure access to your electronic health record. If you see a primary care provider, you can also send messages to your care team and make appointments. If you have questions, please call your primary care clinic.  If you do not have a primary care provider, please call [phone] and they will assist you.        Care EveryWhere ID     This is your Care EveryWhere ID. This could be used by other organizations to access your [facility] medical records  [id number]        Your Vitals Were     Pulse Temperature Last Period Pulse Oximetry BMI (Body Mass Index)       76 97.4  F (36.3  C) (Oral) 01/02/2017 99% 31.24 kg/m2        Blood Pressure from Last 3 Encounters:   09/21/17 123/80   09/13/17 121/82   09/06/17 120/78    Weight from Last 3 Encounters:   09/21/17 198 lb (89.8 kg)   09/13/17 194 lb (88 kg)   09/06/17 191 lb (86.6 kg)              We Performed the Following     FLU VAC, SPLIT VIRUS IM > 3 YO (QUADRIVALENT) [90775]     Vaccine Administration, Initial [67495]        Primary Care Provider Office Phone # Fax #    Stephany Noriega [phone] [address]  [facility] [address]        Equal Access to Services     ROSSY VANEGAS : Hadii aad ku hadasho Sopranavali, waaxda luqadaha, qaybta kaalmada adeegyada, lito henriquez. So [facility] [phone].    ATENCIÓN: Si habla español, tiene a gay disposición servicios gratuitos de asistencia lingüística. Llame al [phone].    We comply with applicable federal civil rights laws and [place] laws. We do not discriminate on the basis of race, color, national origin, age, disability sex, sexual orientation or gender identity.            Thank you!     Thank you for choosing [facility]  for  your care. Our goal is always to provide you with excellent care. Hearing back from our patients is one way we can continue to improve our services. Please take a few minutes to complete the written survey that you may receive in the mail after your visit with us. Thank you!             Your Updated Medication List - Protect others around you: Learn how to safely use, store and throw away your medicines at www.disposemymeds.org.          This list is accurate as of: 9/21/17  3:46 PM.  Always use your most recent med list.                   Brand Name Dispense Instructions for use Diagnosis    prenatal multivitamin plus iron 27-0.8 MG Tabs per tablet      Take 1 tablet by mouth daily        sertraline 50 MG tablet    ZOLOFT    90 tablet    Take 1 tablet (50 mg) by mouth daily    Major depressive disorder, single episode, moderate (H)

## 2017-09-21 NOTE — PROGRESS NOTES
Feeling well.  Baby is active. Denies any leaking of fluid, vaginal bleeding, regular uterine contractions, or headaches or other concerns.  Reviewed lab results and high iron foods.    Reviewed to call [phone] for contractions, loss of fluid, vaginal bleeding, decreased fetal movement or any other questions or concerns.    RTC in 1 weeks.  Karina Mayer, LEEANN, APRN, CNM

## 2017-09-28 ENCOUNTER — PRENATAL OFFICE VISIT (OUTPATIENT)
Dept: MIDWIFE SERVICES | Facility: CLINIC | Age: 37
End: 2017-09-28
Payer: COMMERCIAL

## 2017-09-28 VITALS
WEIGHT: 198.9 LBS | HEART RATE: 72 BPM | HEIGHT: 67 IN | DIASTOLIC BLOOD PRESSURE: 80 MMHG | OXYGEN SATURATION: 100 % | BODY MASS INDEX: 31.22 KG/M2 | TEMPERATURE: 97 F | SYSTOLIC BLOOD PRESSURE: 121 MMHG

## 2017-09-28 DIAGNOSIS — Z34.83 ENCOUNTER FOR SUPERVISION OF OTHER NORMAL PREGNANCY IN THIRD TRIMESTER: Primary | ICD-10-CM

## 2017-09-28 PROCEDURE — 99207 ZZC PRENATAL VISIT: CPT | Performed by: ADVANCED PRACTICE MIDWIFE

## 2017-09-28 NOTE — MR AVS SNAPSHOT
After Visit Summary   9/28/2017    Jasmin Benavidez    MRN: [id number]           Patient Information     Date Of Birth          1980        Visit Information        Provider Department      9/28/2017 10:00 AM Anabela Rodney APRN CNM [facility]        Today's Diagnoses     Encounter for supervision of other normal pregnancy in third trimester    -  1       Follow-ups after your visit        Your next 10 appointments already scheduled     Oct 04, 2017 10:15 AM CDT   ESTABLISHED PRENATAL with ERNESTO Ford CNM   [facility] ([facility])    [address] [place] [id number] [phone]            Oct 11, 2017  8:00 AM CDT   ESTABLISHED PRENATAL with ERNESTO Gambino CNM   [facility] ([facility])    [address] [place] [address] [phone]              Who to contact     If you have questions or need follow up information about today's clinic visit or your schedule please contact [facility] directly at [phone].  Normal or non-critical lab and imaging results will be communicated to you by MyChart, letter or phone within 4 business days after the clinic has received the results. If you do not hear from us within 7 days, please contact the clinic through Shoprockethart or phone. If you have a critical or abnormal lab result, we will notify you by phone as soon as possible.  Submit refill requests through WKS Restaurant or call your pharmacy and they will forward the refill request to us. Please allow 3 business days for your refill to be completed.          Additional Information About Your Visit        MyChart Information     WKS Restaurant gives you secure access to your electronic health record. If you see a primary care provider, you can also send messages to your care team and make appointments. If you have questions, please call your primary care  "clinic.  If you do not have a primary care provider, please call [phone] and they will assist you.        Care EveryWhere ID     This is your Care EveryWhere ID. This could be used by other organizations to access your [facility] medical records  [id number]        Your Vitals Were     Pulse Temperature Height Last Period Pulse Oximetry BMI (Body Mass Index)    72 97  F (36.1  C) (Oral) 5' 6.75\" (1.695 m) 01/02/2017 100% 31.39 kg/m2       Blood Pressure from Last 3 Encounters:   09/28/17 121/80   09/21/17 123/80   09/13/17 121/82    Weight from Last 3 Encounters:   09/28/17 198 lb 14.4 oz (90.2 kg)   09/21/17 198 lb (89.8 kg)   09/13/17 194 lb (88 kg)              Today, you had the following     No orders found for display       Primary Care Provider Office Phone # Fax #    Stephany Noriega [phone] [address] [facility] [address]        Equal Access to Services     [facility]: Hadii rubina ku hadasho Sokristyn, waaxda luqadaha, qaybta kaalmada almas, lito gibson . So [facility] [phone].    ATENCIÓN: Si habla español, tiene a gay disposición servicios gratuitos de asistencia lingüística. Cayla al [phone].    We comply with applicable federal civil rights laws and [place] laws. We do not discriminate on the basis of race, color, national origin, age, disability sex, sexual orientation or gender identity.            Thank you!     Thank you for choosing [facility]  for your care. Our goal is always to provide you with excellent care. Hearing back from our patients is one way we can continue to improve our services. Please take a few minutes to complete the written survey that you may receive in the mail after your visit with us. Thank you!             Your Updated Medication List - Protect others around you: Learn how to safely use, store and throw away your medicines at www.disposemymeds.org.          This list is accurate " as of: 9/28/17 10:11 AM.  Always use your most recent med list.                   Brand Name Dispense Instructions for use Diagnosis    prenatal multivitamin plus iron 27-0.8 MG Tabs per tablet      Take 1 tablet by mouth daily        sertraline 50 MG tablet    ZOLOFT    90 tablet    Take 1 tablet (50 mg) by mouth daily    Major depressive disorder, single episode, moderate (H)

## 2017-10-04 ENCOUNTER — PRENATAL OFFICE VISIT (OUTPATIENT)
Dept: MIDWIFE SERVICES | Facility: CLINIC | Age: 37
End: 2017-10-04
Payer: COMMERCIAL

## 2017-10-04 VITALS
BODY MASS INDEX: 31.56 KG/M2 | HEART RATE: 100 BPM | SYSTOLIC BLOOD PRESSURE: 126 MMHG | DIASTOLIC BLOOD PRESSURE: 77 MMHG | TEMPERATURE: 98.3 F | WEIGHT: 200 LBS | OXYGEN SATURATION: 99 %

## 2017-10-04 DIAGNOSIS — Z34.83 ENCOUNTER FOR SUPERVISION OF OTHER NORMAL PREGNANCY IN THIRD TRIMESTER: Primary | ICD-10-CM

## 2017-10-04 PROCEDURE — 99207 ZZC PRENATAL VISIT: CPT | Performed by: ADVANCED PRACTICE MIDWIFE

## 2017-10-04 NOTE — PROGRESS NOTES
"Patient feeling well. Denies contractions, leaking of fluid, and bleeding, or other concerns. Patient reports active baby. Back pain is \"less\" is the past week. Reviewed signs of labor and desire to have water birth. Patient states she knows who to call and where to go when labor in suspected.   39w2d  Discussed GBS positive and to labor at home for water Birth option unless ROM like 1st labor.    JE  "

## 2017-10-04 NOTE — MR AVS SNAPSHOT
After Visit Summary   10/4/2017    Jasmin Benavidez    MRN: [id number]           Patient Information     Date Of Birth          1980        Visit Information        Provider Department      10/4/2017 10:15 AM Henok Villa APRN CNM [facility]        Today's Diagnoses     Encounter for supervision of other normal pregnancy in third trimester    -  1       Follow-ups after your visit        Your next 10 appointments already scheduled     Oct 11, 2017  8:00 AM CDT   ESTABLISHED PRENATAL with ERNESTO Gambino CNM   [facility] ([facility])    [address] [place] [address] [phone]              Who to contact     If you have questions or need follow up information about today's clinic visit or your schedule please contact [facility] directly at [phone].  Normal or non-critical lab and imaging results will be communicated to you by MyChart, letter or phone within 4 business days after the clinic has received the results. If you do not hear from us within 7 days, please contact the clinic through [employer] or phone. If you have a critical or abnormal lab result, we will notify you by phone as soon as possible.  Submit refill requests through inTarvo or call your pharmacy and they will forward the refill request to us. Please allow 3 business days for your refill to be completed.          Additional Information About Your Visit        MyChart Information     in[employer]vo gives you secure access to your electronic health record. If you see a primary care provider, you can also send messages to your care team and make appointments. If you have questions, please call your primary care clinic.  If you do not have a primary care provider, please call [phone] and they will assist you.        Care EveryWhere ID     This is your Care EveryWhere ID. This could be used by other organizations to access your  [facility] medical records  [id number]        Your Vitals Were     Pulse Temperature Last Period Pulse Oximetry BMI (Body Mass Index)       100 98.3  F (36.8  C) (Oral) 01/02/2017 99% 31.56 kg/m2        Blood Pressure from Last 3 Encounters:   10/04/17 126/77   09/28/17 121/80   09/21/17 123/80    Weight from Last 3 Encounters:   10/04/17 200 lb (90.7 kg)   09/28/17 198 lb 14.4 oz (90.2 kg)   09/21/17 198 lb (89.8 kg)              Today, you had the following     No orders found for display       Primary Care Provider Office Phone # Fax #    Stephany Noriega [phone] [address] [facility] [address]        Equal Access to Services     NAGA VANEGAS : Hadii rubina fernandes Sokristyn, waaxda luqadaha, qaybta kaalmada almas, lito gibson . So [facility] [phone].    ATENCIÓN: Si habla español, tiene a gay disposición servicios gratuitos de asistencia lingüística. Cayla al [phone].    We comply with applicable federal civil rights laws and [place] laws. We do not discriminate on the basis of race, color, national origin, age, disability, sex, sexual orientation, or gender identity.            Thank you!     Thank you for choosing [facility]  for your care. Our goal is always to provide you with excellent care. Hearing back from our patients is one way we can continue to improve our services. Please take a few minutes to complete the written survey that you may receive in the mail after your visit with us. Thank you!             Your Updated Medication List - Protect others around you: Learn how to safely use, store and throw away your medicines at www.disposemymeds.org.          This list is accurate as of: 10/4/17 11:01 AM.  Always use your most recent med list.                   Brand Name Dispense Instructions for use Diagnosis    prenatal multivitamin plus iron 27-0.8 MG Tabs per tablet      Take 1 tablet by mouth daily         sertraline 50 MG tablet    ZOLOFT    90 tablet    Take 1 tablet (50 mg) by mouth daily    Major depressive disorder, single episode, moderate (H)

## 2017-10-11 ENCOUNTER — PRENATAL OFFICE VISIT (OUTPATIENT)
Dept: MIDWIFE SERVICES | Facility: CLINIC | Age: 37
End: 2017-10-11
Payer: COMMERCIAL

## 2017-10-11 VITALS
DIASTOLIC BLOOD PRESSURE: 77 MMHG | WEIGHT: 202.1 LBS | HEIGHT: 67 IN | HEART RATE: 62 BPM | SYSTOLIC BLOOD PRESSURE: 121 MMHG | BODY MASS INDEX: 31.72 KG/M2

## 2017-10-11 DIAGNOSIS — Z34.83 ENCOUNTER FOR SUPERVISION OF OTHER NORMAL PREGNANCY IN THIRD TRIMESTER: Primary | ICD-10-CM

## 2017-10-11 PROCEDURE — 99207 ZZC PRENATAL VISIT: CPT | Performed by: ADVANCED PRACTICE MIDWIFE

## 2017-10-11 NOTE — MR AVS SNAPSHOT
After Visit Summary   10/11/2017    Jasmin Benavidez    MRN: [id number]           Patient Information     Date Of Birth          1980        Visit Information        Provider Department      10/11/2017 8:00 AM Francesca Horton APRN CNM [facility]        Today's Diagnoses     Encounter for supervision of other normal pregnancy in third trimester    -  1       Follow-ups after your visit        Future tests that were ordered for you today     Open Future Orders        Priority Expected Expires Ordered    US OB > 14 Weeks Complete Single Routine  10/11/2018 10/11/2017            Who to contact     If you have questions or need follow up information about today's clinic visit or your schedule please contact [facility] directly at [phone].  Normal or non-critical lab and imaging results will be communicated to you by [employer], letter or phone within 4 business days after the clinic has received the results. If you do not hear from us within 7 days, please contact the clinic through [employer] or phone. If you have a critical or abnormal lab result, we will notify you by phone as soon as possible.  Submit refill requests through Nom[employer] or call your pharmacy and they will forward the refill request to us. Please allow 3 business days for your refill to be completed.          Additional Information About Your Visit        MyChart Information     [employer] gives you secure access to your electronic health record. If you see a primary care provider, you can also send messages to your care team and make appointments. If you have questions, please call your primary care clinic.  If you do not have a primary care provider, please call [phone] and they will assist you.        Care EveryWhere ID     This is your Care EveryWhere ID. This could be used by other organizations to access your [facility] medical records  [id number]        Your Vitals Were     Pulse Height Last  "Period BMI (Body Mass Index)          62 5' 6.75\" (1.695 m) 01/02/2017 31.89 kg/m2         Blood Pressure from Last 3 Encounters:   10/11/17 121/77   10/04/17 126/77   09/28/17 121/80    Weight from Last 3 Encounters:   10/11/17 202 lb 1.6 oz (91.7 kg)   10/04/17 200 lb (90.7 kg)   09/28/17 198 lb 14.4 oz (90.2 kg)               Primary Care Provider Office Phone # Fax #    Stephany Noriega [phone] [address] [facility] [address]        Equal Access to Services     NAGA VANEGAS : Hadii rubina Kent, wajayda higinio, qaybta kaalmada adetommyyada, lito henriquez. So [facility] [phone].    ATENCIÓN: Si habla español, tiene a gay disposición servicios gratuitos de asistencia lingüística. [facility] [phone].    We comply with applicable federal civil rights laws and [place] laws. We do not discriminate on the basis of race, color, national origin, age, disability, sex, sexual orientation, or gender identity.            Thank you!     Thank you for choosing [facility]  for your care. Our goal is always to provide you with excellent care. Hearing back from our patients is one way we can continue to improve our services. Please take a few minutes to complete the written survey that you may receive in the mail after your visit with us. Thank you!             Your Updated Medication List - Protect others around you: Learn how to safely use, store and throw away your medicines at www.disposemymeds.org.          This list is accurate as of: 10/11/17  8:20 AM.  Always use your most recent med list.                   Brand Name Dispense Instructions for use Diagnosis    prenatal multivitamin plus iron 27-0.8 MG Tabs per tablet      Take 1 tablet by mouth daily        sertraline 50 MG tablet    ZOLOFT    90 tablet    Take 1 tablet (50 mg) by mouth daily    Major depressive disorder, single episode, moderate (H)         "

## 2017-10-11 NOTE — PROGRESS NOTES
40w2d  Occasional contractions.   Ready for delivery but not overly anxious.  Would still like waterbirth as an option.  SVE;  1-2cm / 50%/-2 station. Enc. Pelvic tilts.  Postdate testing to be scheduled for Monday.  Labor signs reviewed rtc next week claudia

## 2017-10-12 ENCOUNTER — HOSPITAL ENCOUNTER (INPATIENT)
Facility: CLINIC | Age: 37
LOS: 3 days | Discharge: HOME HEALTH | End: 2017-10-15
Attending: ADVANCED PRACTICE MIDWIFE | Admitting: ADVANCED PRACTICE MIDWIFE
Payer: COMMERCIAL

## 2017-10-12 ENCOUNTER — NURSE TRIAGE (OUTPATIENT)
Dept: NURSING | Facility: CLINIC | Age: 37
End: 2017-10-12

## 2017-10-12 LAB
ABO + RH BLD: NORMAL
ABO + RH BLD: NORMAL
BLD GP AB SCN SERPL QL: NORMAL
BLOOD BANK CMNT PATIENT-IMP: NORMAL
SPECIMEN EXP DATE BLD: NORMAL

## 2017-10-12 PROCEDURE — 86850 RBC ANTIBODY SCREEN: CPT | Performed by: ADVANCED PRACTICE MIDWIFE

## 2017-10-12 PROCEDURE — 86901 BLOOD TYPING SEROLOGIC RH(D): CPT | Performed by: ADVANCED PRACTICE MIDWIFE

## 2017-10-12 PROCEDURE — 25000132 ZZH RX MED GY IP 250 OP 250 PS 637: Performed by: ADVANCED PRACTICE MIDWIFE

## 2017-10-12 PROCEDURE — 36415 COLL VENOUS BLD VENIPUNCTURE: CPT | Performed by: ADVANCED PRACTICE MIDWIFE

## 2017-10-12 PROCEDURE — 12000032 ZZH R&B OB CRITICAL UMMC

## 2017-10-12 PROCEDURE — 25000128 H RX IP 250 OP 636: Performed by: ADVANCED PRACTICE MIDWIFE

## 2017-10-12 PROCEDURE — 86900 BLOOD TYPING SEROLOGIC ABO: CPT | Performed by: ADVANCED PRACTICE MIDWIFE

## 2017-10-12 PROCEDURE — S0191 MISOPROSTOL, ORAL, 200 MCG: HCPCS | Performed by: ADVANCED PRACTICE MIDWIFE

## 2017-10-12 RX ORDER — NALOXONE HYDROCHLORIDE 0.4 MG/ML
.1-.4 INJECTION, SOLUTION INTRAMUSCULAR; INTRAVENOUS; SUBCUTANEOUS
Status: DISCONTINUED | OUTPATIENT
Start: 2017-10-12 | End: 2017-10-13

## 2017-10-12 RX ORDER — OXYTOCIN 10 [USP'U]/ML
10 INJECTION, SOLUTION INTRAMUSCULAR; INTRAVENOUS
Status: DISCONTINUED | OUTPATIENT
Start: 2017-10-12 | End: 2017-10-13

## 2017-10-12 RX ORDER — ZOLPIDEM TARTRATE 5 MG/1
5 TABLET ORAL
Status: DISCONTINUED | OUTPATIENT
Start: 2017-10-12 | End: 2017-10-13

## 2017-10-12 RX ORDER — PENICILLIN G POTASSIUM 5000000 [IU]/1
5 INJECTION, POWDER, FOR SOLUTION INTRAMUSCULAR; INTRAVENOUS ONCE
Status: COMPLETED | OUTPATIENT
Start: 2017-10-12 | End: 2017-10-12

## 2017-10-12 RX ORDER — SODIUM CHLORIDE, SODIUM LACTATE, POTASSIUM CHLORIDE, CALCIUM CHLORIDE 600; 310; 30; 20 MG/100ML; MG/100ML; MG/100ML; MG/100ML
INJECTION, SOLUTION INTRAVENOUS CONTINUOUS
Status: DISCONTINUED | OUTPATIENT
Start: 2017-10-12 | End: 2017-10-13

## 2017-10-12 RX ORDER — ACETAMINOPHEN 325 MG/1
650 TABLET ORAL EVERY 4 HOURS PRN
Status: DISCONTINUED | OUTPATIENT
Start: 2017-10-12 | End: 2017-10-13

## 2017-10-12 RX ORDER — CARBOPROST TROMETHAMINE 250 UG/ML
250 INJECTION, SOLUTION INTRAMUSCULAR
Status: DISCONTINUED | OUTPATIENT
Start: 2017-10-12 | End: 2017-10-13

## 2017-10-12 RX ORDER — FENTANYL CITRATE 50 UG/ML
100 INJECTION, SOLUTION INTRAMUSCULAR; INTRAVENOUS
Status: DISCONTINUED | OUTPATIENT
Start: 2017-10-12 | End: 2017-10-13

## 2017-10-12 RX ORDER — MISOPROSTOL 100 UG/1
25 TABLET ORAL
Status: DISCONTINUED | OUTPATIENT
Start: 2017-10-12 | End: 2017-10-13

## 2017-10-12 RX ORDER — ONDANSETRON 2 MG/ML
4 INJECTION INTRAMUSCULAR; INTRAVENOUS EVERY 6 HOURS PRN
Status: DISCONTINUED | OUTPATIENT
Start: 2017-10-12 | End: 2017-10-13

## 2017-10-12 RX ORDER — MORPHINE SULFATE 10 MG/ML
15 INJECTION, SOLUTION INTRAMUSCULAR; INTRAVENOUS
Status: DISCONTINUED | OUTPATIENT
Start: 2017-10-12 | End: 2017-10-13

## 2017-10-12 RX ORDER — PROMETHAZINE HYDROCHLORIDE 25 MG/ML
25 INJECTION, SOLUTION INTRAMUSCULAR; INTRAVENOUS
Status: DISCONTINUED | OUTPATIENT
Start: 2017-10-12 | End: 2017-10-13

## 2017-10-12 RX ORDER — IBUPROFEN 800 MG/1
800 TABLET, FILM COATED ORAL
Status: DISCONTINUED | OUTPATIENT
Start: 2017-10-12 | End: 2017-10-13

## 2017-10-12 RX ORDER — OXYTOCIN/0.9 % SODIUM CHLORIDE 30/500 ML
100-340 PLASTIC BAG, INJECTION (ML) INTRAVENOUS CONTINUOUS PRN
Status: DISCONTINUED | OUTPATIENT
Start: 2017-10-12 | End: 2017-10-13

## 2017-10-12 RX ORDER — HYDROXYZINE HYDROCHLORIDE 50 MG/1
150 TABLET, FILM COATED ORAL
Status: COMPLETED | OUTPATIENT
Start: 2017-10-12 | End: 2017-10-12

## 2017-10-12 RX ORDER — OXYCODONE AND ACETAMINOPHEN 5; 325 MG/1; MG/1
1 TABLET ORAL
Status: DISCONTINUED | OUTPATIENT
Start: 2017-10-12 | End: 2017-10-13

## 2017-10-12 RX ORDER — METHYLERGONOVINE MALEATE 0.2 MG/ML
200 INJECTION INTRAVENOUS
Status: DISCONTINUED | OUTPATIENT
Start: 2017-10-12 | End: 2017-10-13

## 2017-10-12 RX ADMIN — Medication 25 MCG: at 16:04

## 2017-10-12 RX ADMIN — Medication 2.5 MILLION UNITS: at 16:04

## 2017-10-12 RX ADMIN — Medication 2.5 MILLION UNITS: at 19:51

## 2017-10-12 RX ADMIN — Medication 25 MCG: at 12:05

## 2017-10-12 RX ADMIN — Medication 2.5 MILLION UNITS: at 11:11

## 2017-10-12 RX ADMIN — Medication 25 MCG: at 10:08

## 2017-10-12 RX ADMIN — Medication 25 MCG: at 14:19

## 2017-10-12 RX ADMIN — PENICILLIN G POTASSIUM 5 MILLION UNITS: 5000000 POWDER, FOR SOLUTION INTRAMUSCULAR; INTRAPLEURAL; INTRATHECAL; INTRAVENOUS at 07:47

## 2017-10-12 RX ADMIN — HYDROXYZINE HYDROCHLORIDE 150 MG: 50 TABLET, FILM COATED ORAL at 22:09

## 2017-10-12 RX ADMIN — SODIUM CHLORIDE, POTASSIUM CHLORIDE, SODIUM LACTATE AND CALCIUM CHLORIDE: 600; 310; 30; 20 INJECTION, SOLUTION INTRAVENOUS at 07:47

## 2017-10-12 NOTE — PROGRESS NOTES
"S: Pt eating lunch, had IV site changed to a more comfortable position, so feels better about that  O: /60  Temp 97.7  F (36.5  C) (Oral)  Resp 16  Ht 1.702 m (5' 7\")  Wt 91.6 kg (202 lb)  LMP 01/02/2017  BMI 31.64 kg/m2   SVE deferred  FHTs 145 moderate variability,   Cxt q 2-5 mins lasting 60-90 secs  Clear fluid  A: IUP @ 40w3d   SROM x 10 hrs, GBS+, afebrile, abx prophylaxis  Cat 1 FHt tracing  Non ripe cervix  Desires as low intervention as possible  P: Continue serial misprostil for cervical ripening  Continue abx prophylaxis   Hopefully moving closer to labor    Anabela Rodney CNM   "

## 2017-10-12 NOTE — TELEPHONE ENCOUNTER
"Patient calling requesting to speak with Midwife on call. Reporting \"my water just broke.\" Clear fluid. Denies contractions.  HUNTER 10/9/17.   Paged Anabela Rodney Midwife on call through [facility] at 411 a.m. To call patient [phone].  Patient was advised to call back if no return call with in 20 minutes.    Katia Winslow RN  [facility]      Reason for Disposition    Leakage of fluid from vagina    Additional Information    Negative: [1] SEVERE abdominal pain (e.g., excruciating) AND [2] constant AND [3] present > 1 hour    Negative: Severe bleeding (e.g., continuous red blood from vagina, or large blood clots)    Negative: Umbilical cord hanging out of the vagina (shiny, white, curled appearance, \"like telephone cord\")    Negative: Uncontrollable urge to push (i.e., feels like baby is coming out now)    Negative: Can see baby    Negative: Sounds like a life-threatening emergency to the triager    Negative: < 20 weeks pregnant    Negative: Vaginal bleeding    Protocols used: PREGNANCY - RUPTURE OF MEMBRANES-ADULT-AH    "

## 2017-10-12 NOTE — IP AVS SNAPSHOT
MRN:[id number]                      After Visit Summary   10/12/2017    Jasmin Benavidez    MRN: [id number]           Thank you!     Thank you for choosing [facility] for your care. Our goal is always to provide you with excellent care. Hearing back from our patients is one way we can continue to improve our services. Please take a few minutes to complete the written survey that you may receive in the mail after you visit with us. Thank you!        Patient Information     Date Of Birth          1980        Designated Caregiver       Most Recent Value    Caregiver    Will someone help with your care after discharge? no      About your hospital stay     You were admitted on:  October 12, 2017 You last received care in the:  [facility]    You were discharged on:  October 15, 2017       Who to Call     For medical emergencies, please call 911.  For non-urgent questions about your medical care, please call your primary care provider or clinic, [phone]          Attending Provider     Provider Specialty    Ethel Bee, ERNESTO CNM Midwives    Reinier, Kerri Jack, APRN CNM OB/Gyn    Anabela Rodney APRN CNM OB/Gyn    Leyla, Karina Diallo, ZULEMA Midwives       Primary Care Provider Office Phone # Fax #    Stephany Noriega [phone]      Your next 10 appointments already scheduled     Oct 16, 2017  2:00 PM CDT   US OB > 14 WEEKS COMPLETE SINGLE with RDUS1   [facility] ([facility])    [address] [place] [address] [phone]           Please bring a list of your medicines (including vitamins, minerals and over-the-counter drugs). Also, tell your doctor about any allergies you may have. Wear comfortable clothes and leave your valuables at home.  If you re less than 20 weeks drink four 8-ounce glasses of fluid an hour before your exam. If you need to empty your bladder before your exam, try to release only a  little urine. Then, drink another glass of fluid.  You may have up to two family members in the exam room. If you bring a small child, an adult must be there to care for him or her.  Please call the Imaging Department at your exam site with any questions.            Oct 16, 2017  2:30 PM CDT   ESTABLISHED PRENATAL with RD NON STRESS TEST MAYRA   [facility]    [address] [place] [address] [address]  [facility] [id number] [phone]            Oct 16, 2017  2:45 PM CDT   ESTABLISHED PRENATAL with ERNESTO Yousif CNM   [facility] ([facility]    [address] [place] [address] 700  [facility] [id number] [phone]              Further instructions from your care team       Postpartum Vaginal Delivery Instructions    Activity       Ask family and friends for help when you need it.    Do not place anything in your vagina for 6 weeks.    You are not restricted on other activities, but take it easy for a few weeks to allow your body to recover from delivery.  You are able to do any activities you feel up to that point.    No driving until you have stopped taking your pain medications (usually two weeks after delivery).     Call your health care provider if you have any of these symptoms:       Increased pain, swelling, redness, or fluid around your stiches from an episiotomy or perineal tear.    A fever above 100.4 F (38 C) with or without chills when placing a thermometer under your tongue.    You soak a sanitary pad with blood within 1 hour, or you see blood clots larger than a golf ball.    Bleeding that lasts more than 6 weeks.    Vaginal discharge that smells bad.    Severe pain, cramping or tenderness in your lower belly area.    A need to urinate more frequently (use the toilet more often), more urgently (use the toilet very quickly), or it burns when you urinate.    Nausea and vomiting.    Redness, swelling or pain around a  "vein in your leg.    Problems breastfeeding or a red or painful area on your breast.    Chest pain and cough or are gasping for air.    Problems coping with sadness, anxiety, or depression.  If you have any concerns about hurting yourself or the baby, call your provider immediately.     You have questions or concerns after you return home.     Keep your hands clean:  Always wash your hands before touching your perineal area and stitches.  This helps reduce your risk of infection.  If your hands aren't dirty, you may use an alcohol hand-rub to clean your hands. Keep your nails clean and short.        Pending Results     Date and Time Order Name Status Description    10/13/2017 2009 Anti Treponema In process             Statement of Approval     Ordered          10/15/17 0942  I have reviewed and agree with all the recommendations and orders detailed in this document.  EFFECTIVE NOW     Approved and electronically signed by:  Karina Mayer CNM             Admission Information     Date & Time Provider Department Dept. Phone    10/12/2017 Karina Mayer CNM [facility] [phone]      Your Vitals Were     Blood Pressure Pulse Temperature Respirations Height Weight    128/84 60 97.6  F (36.4  C) (Oral) 18 1.702 m (5' 7\") 91.6 kg (202 lb)    Last Period BMI (Body Mass Index)                01/02/2017 31.64 kg/m2          MyChart Information     mth sense gives you secure access to your electronic health record. If you see a primary care provider, you can also send messages to your care team and make appointments. If you have questions, please call your primary care clinic.  If you do not have a primary care provider, please call [phone] and they will assist you.        Care EveryWhere ID     This is your Care EveryWhere ID. This could be used by other organizations to access your [facility] medical records  [id number]        Equal Access to Services     NAGA VANEGAS AH: Nilo Kent, " waaxda luqadaha, qaybta kaalmada almas, lito valverdeaan ah. So [facility] [phone].    ATENCIÓN: Si lukas vásquez, tiene a gay disposición servicios gratuitos de asistencia lingüística. Cayla al [phone].    We comply with applicable federal civil rights laws and [place] laws. We do not discriminate on the basis of race, color, national origin, age, disability, sex, sexual orientation, or gender identity.               Review of your medicines      START taking        Dose / Directions    docusate sodium 100 MG tablet   Commonly known as:  COLACE        Dose:  100 mg   Take 100 mg by mouth daily   Quantity:  30 tablet   Refills:  0         CONTINUE these medicines which have NOT CHANGED        Dose / Directions    prenatal multivitamin plus iron 27-0.8 MG Tabs per tablet   Indication:  Pregnancy        Dose:  1 tablet   Take 1 tablet by mouth daily   Refills:  0       sertraline 50 MG tablet   Commonly known as:  ZOLOFT   Used for:  Major depressive disorder, single episode, moderate (H)        Dose:  50 mg   Take 1 tablet (50 mg) by mouth daily   Quantity:  90 tablet   Refills:  3            Where to get your medicines      These medications were sent to [place], [place] - [address]  [address] [address]     Phone:  [phone]     docusate sodium 100 MG tablet                Protect others around you: Learn how to safely use, store and throw away your medicines at www.disposemymeds.org.             Medication List: This is a list of all your medications and when to take them. Check marks below indicate your daily home schedule. Keep this list as a reference.      Medications           Morning Afternoon Evening Bedtime As Needed    docusate sodium 100 MG tablet   Commonly known as:  COLACE   Take 100 mg by mouth daily                                prenatal multivitamin plus iron 27-0.8 MG Tabs per tablet   Take 1 tablet by  mouth daily                                sertraline 50 MG tablet   Commonly known as:  ZOLOFT   Take 1 tablet (50 mg) by mouth daily

## 2017-10-12 NOTE — H&P
ADMIT NOTE  =================  40w3d  Jasmin Benavidez is a 37 year old female     with an Patient's last menstrual period was 2017. and Estimated Date of Delivery: Oct 9, 2017 [facility]2017 at 7:19 AM  in ruptured, GBS + with no labor.   Fetal movement- active  ROM- yes, moderate clear   GBS- positive    HPI: Patient called at 0430 stating that her water broke.  Reports good fetal movement since SROM. Clear fluid. GBS positive, reviewed with patient need for IV antibiotics  For prophylaxis.   Having occasional infrequent contractions.  Denies headaches, epigastric pain, vision changes, vaginal bleeding.  Desires a water birth. Here with .   ================    FOB- is involved      PRENATAL COURSE  =================  Prenatal course was essentially uncomplicated     HISTORIES  ============  Allergies   Allergen Reactions     No Known Drug Allergies      Past Medical History:   Diagnosis Date     Allergy, unspecified not elsewhere classified     using prn otc meds, w orks     Migraine, unspecified, without mention of intractable migraine without mention of status migrainosus     no issues any more     Past Surgical History:   Procedure Laterality Date     C ORAL SURGERY PROCEDURE      wisdom teeth removal   .  Family History   Problem Relation Age of Onset     Hypertension Mother      Tumor Mother      in stomach     Hypertension Father      Allergies Father      has seasonal allergies     DIABETES Maternal Uncle      Breast Cancer Paternal Grandmother      early 60's     CANCER Paternal Grandfather      lung ca     Cancer - colorectal Other      paternal aunt, dx at 54     CANCER Other      cervical cancer-treated in      Social History   Substance Use Topics     Smoking status: Never Smoker     Smokeless tobacco: Never Used     Alcohol use No     Obstetric History       T1      L1     SAB0   TAB0   Ectopic0   Multiple0   Live Births1       #  Outcome Date GA Lbr Marcos/2nd Weight Sex Delivery Anes PTL Lv   2 Current            1 Term 10/30/15 40w4d 07:35 / 04:48 3.685 kg (8 lb 2 oz) F Vag-Spont Local  FAM      Name: Merissa Napier      Apgar1:  9                Apgar5: 9           LABS:   ===========  Rhogam not indicated  Lab Results   Component Value Date    ABO O 03/08/2017       Lab Results   Component Value Date    RH  Pos 03/08/2017     No results found for: RUBELLAABIGG   Anti-Treponemia: neg  No results found for: HIV  Lab Results   Component Value Date    HGB 10.9 09/13/2017      Lab Results   Component Value Date    HEPBANG Nonreactive 03/08/2017     Lab Results   Component Value Date    GBS Positive 09/13/2017     1 hr GCT= 98    ROS  =========  Pt denies significant respiratory, cardiovacular, GI, or muscular/skeletalcomplaints.    See RN data base ROS.     PHYSICAL EXAM:  ===============  Blood pressure 132/88, temperature 97.8  F (36.6  C), temperature source Oral, resp. rate 18, last menstrual period 01/02/2017, not currently breastfeeding.  General appearance: comfortable  Heart: RRR without murmur  Lungs: clear to auscultation   Neuro: denies headache and visual disturbances  Psych: Mentation normal and bright   Legs: 2+/2+, no clonus, no edema      Abdomen: gravid, single vertex fetus, non-tender between contractions.  EFW-  7 lbs.   CONTACTIONS: Contractions every 3-5 minutes.  Palpate: not felt by patient  FETAL HEART TONES: baseline 135 with moderate FHR variability and  pos accelerations.  No decelerations present.      PELVIC EXAM: deferred, GBS+ and ruptured, will re-assess after treatment     BLOODY SHOW: no   ROM: Yes  FLUID: clear  AMNISURE: not done    ASSESSMENT:  ==============  IUP @ 40w3d in ruptured with no labor    NST REACTIVE    Fetal Heart rate tracing Category category one  GBS- positive       PLAN:  ===========  Admit - see IP orders  Prophylactic antibiotic for + GBS status     Ethel Bee, APRN ZULEMA PIMENTEL  ZULEMA Bee

## 2017-10-12 NOTE — PROGRESS NOTES
"SL Pt dozing, comfortable, distressed that she is ruptured with + GBS and no labor.  Did not want to be induced, and seems to be a repeat of her labor course form last time which was long.  O: /88  Temp 97.8  F (36.6  C) (Oral)  Resp 18  Ht 1.702 m (5' 7\")  Wt 91.6 kg (202 lb)  LMP 01/02/2017  BMI 31.64 kg/m2   SVE thick, high, did not get all the way through to internal os because of position  Brief BSUS vertex with fetal neck and back along maternal left  FHTs 140, monitor just reapplied  Cxt rare  Clear fluid  A: IUP @ 40w3d   SROM x 6 hrs , no labor  Desires as low intervention as possible  Was hoping for waterbirth  P: Discussion about risk of conservative management, would recommend moving towards delivery.  Pt really does not want pitocin, discussed cervical ripening with oral miso would be reasonable especially considering cervix is not ripe.  Will plan on serial misoprostil  Up to walk with telemetry  Continue abx prophylaxis    Anabela Rodney CNM        "

## 2017-10-12 NOTE — IP AVS SNAPSHOT
[facility]    [address] [address] [address]    Phone:  [phone]                                       After Visit Summary   10/12/2017    Jasmin Benavidez    MRN: [id number]           After Visit Summary Signature Page     I have received my discharge instructions, and my questions have been answered. I have discussed any challenges I see with this plan with the nurse or doctor.    ..........................................................................................................................................  Patient/Patient Representative Signature      ..........................................................................................................................................  Patient Representative Print Name and Relationship to Patient    ..................................................               ................................................  Date                                            Time    ..........................................................................................................................................  Reviewed by Signature/Title    ...................................................              ..............................................  Date                                                            Time

## 2017-10-12 NOTE — PROGRESS NOTES
"S: Pt states when up she can feel some contractions, when laying down can't feel them  O: /85  Pulse 70  Temp 97.7  F (36.5  C) (Oral)  Resp 16  Ht 1.702 m (5' 7\")  Wt 91.6 kg (202 lb)  LMP 01/02/2017  BMI 31.64 kg/m2   SVE deferred  FHTs 135, moderate variability, + accels, no decels  Cxt q 3-4 lasting 45 secs, mild  Clear fluid  Has had 3 doses of oral misoprostil  A: IUP @ 40w3d   SROM x 12 hours, afebrile, GBS +  Cat 1 FHT tracing  Non ripe cervix  Serial oral misoprostil  P: Continue with abx prophylaxis  Continue with cervical ripening and next dose of misoprostil  Continue to move towards delivery    Anabela Rodney CNM     "

## 2017-10-12 NOTE — PLAN OF CARE
Problem: Labor (Cervical Ripen, Induct, Augment) (Adult,Obstetrics,Pediatric)  Goal: Signs and Symptoms of Listed Potential Problems Will be Absent, Minimized or Managed (Labor)  Signs and symptoms of listed potential problems will be absent, minimized or managed by discharge/transition of care (reference Labor (Cervical Ripen, Induct, Augment) (Adult,Obstetrics,Pediatric) CPG).   Outcome: Therapy, progress toward functional goals as expected  Data: Patient admitted to room 477 at 0715. Patient is a . Prenatal record reviewed.   Obstetric History       T1      L1     SAB0   TAB0   Ectopic0   Multiple0   Live Births1        # Outcome Date GA Lbr Marcos/2nd Weight Sex Delivery Anes PTL Lv   2 Current                     1 Term 10/30/15 40w4d 07:35 / 04:48 3.685 kg (8 lb 2 oz) F Vag-Spont Local   FAM      Name: Merissa Napier      Apgar1:  9                Apgar5: 9       .  Medical History:   Past Medical History:   Diagnosis Date     Allergy, unspecified not elsewhere classified       using prn otc meds, w orks     Migraine, unspecified, without mention of intractable migraine without mention of status migrainosus       no issues any more   .  Gestational age 40w3d. Vital signs per doc flowsheet. Fetal movement present. Patient reports Fluid Leak   as reason for admission. Support persons is present.  Action: Report from KYLEIGH Agarwal obtained at 0723. Care of patient assumed at 0723 Verbal consent for EFM, external fetal monitors applied. Admission assessment completed. Patient and support persons educated on labor process. Patient instructed to report change in fetal movement, contractions, vaginal leaking of fluid or bleeding, abdominal pain, or any concerns related to the pregnancy to her nurse/physician. Patient oriented to room, call light in reach.   Response: . Plan per provider is as ordered. Patient verbalized understanding of education and verbalized agreement with plan. Patient coping with  labor via family support.

## 2017-10-12 NOTE — PLAN OF CARE
Problem: Labor (Cervical Ripen, Induct, Augment) (Adult,Obstetrics,Pediatric)  Goal: Signs and Symptoms of Listed Potential Problems Will be Absent, Minimized or Managed (Labor)  Signs and symptoms of listed potential problems will be absent, minimized or managed by discharge/transition of care (reference Labor (Cervical Ripen, Induct, Augment) (Adult,Obstetrics,Pediatric) CPG).   Outcome: No Change  The cervical ripening is continue. Patient is comfortable, up ambulating. See flow sheet for fetal and uterine monitoring.Will continue to monitor labor and notify the provider with changes

## 2017-10-13 LAB
ALT SERPL W P-5'-P-CCNC: 13 U/L (ref 0–50)
AST SERPL W P-5'-P-CCNC: 12 U/L (ref 0–45)
BASOPHILS # BLD AUTO: 0 10E9/L (ref 0–0.2)
BASOPHILS NFR BLD AUTO: 0.2 %
DIFFERENTIAL METHOD BLD: ABNORMAL
EOSINOPHIL # BLD AUTO: 0 10E9/L (ref 0–0.7)
EOSINOPHIL NFR BLD AUTO: 0.2 %
ERYTHROCYTE [DISTWIDTH] IN BLOOD BY AUTOMATED COUNT: 14.5 % (ref 10–15)
HCT VFR BLD AUTO: 33 % (ref 35–47)
HGB BLD-MCNC: 11.1 G/DL (ref 11.7–15.7)
IMM GRANULOCYTES # BLD: 0 10E9/L (ref 0–0.4)
IMM GRANULOCYTES NFR BLD: 0.3 %
LYMPHOCYTES # BLD AUTO: 1.3 10E9/L (ref 0.8–5.3)
LYMPHOCYTES NFR BLD AUTO: 10.3 %
MCH RBC QN AUTO: 28.8 PG (ref 26.5–33)
MCHC RBC AUTO-ENTMCNC: 33.6 G/DL (ref 31.5–36.5)
MCV RBC AUTO: 86 FL (ref 78–100)
MONOCYTES # BLD AUTO: 0.5 10E9/L (ref 0–1.3)
MONOCYTES NFR BLD AUTO: 3.7 %
NEUTROPHILS # BLD AUTO: 11.1 10E9/L (ref 1.6–8.3)
NEUTROPHILS NFR BLD AUTO: 85.3 %
NRBC # BLD AUTO: 0 10*3/UL
NRBC BLD AUTO-RTO: 0 /100
PLATELET # BLD AUTO: 182 10E9/L (ref 150–450)
PROT UR-MCNC: 0.05 G/L
PROT/CREAT 24H UR: 0.23 G/G CR (ref 0–0.2)
RBC # BLD AUTO: 3.86 10E12/L (ref 3.8–5.2)
WBC # BLD AUTO: 13 10E9/L (ref 4–11)

## 2017-10-13 PROCEDURE — 84156 ASSAY OF PROTEIN URINE: CPT | Performed by: ADVANCED PRACTICE MIDWIFE

## 2017-10-13 PROCEDURE — 36415 COLL VENOUS BLD VENIPUNCTURE: CPT | Performed by: ADVANCED PRACTICE MIDWIFE

## 2017-10-13 PROCEDURE — 59400 OBSTETRICAL CARE: CPT | Performed by: ADVANCED PRACTICE MIDWIFE

## 2017-10-13 PROCEDURE — 84450 TRANSFERASE (AST) (SGOT): CPT | Performed by: ADVANCED PRACTICE MIDWIFE

## 2017-10-13 PROCEDURE — 84460 ALANINE AMINO (ALT) (SGPT): CPT | Performed by: ADVANCED PRACTICE MIDWIFE

## 2017-10-13 PROCEDURE — 12000032 ZZH R&B OB CRITICAL UMMC

## 2017-10-13 PROCEDURE — 0KQM0ZZ REPAIR PERINEUM MUSCLE, OPEN APPROACH: ICD-10-PCS | Performed by: ADVANCED PRACTICE MIDWIFE

## 2017-10-13 PROCEDURE — 25000128 H RX IP 250 OP 636: Performed by: ADVANCED PRACTICE MIDWIFE

## 2017-10-13 PROCEDURE — 85025 COMPLETE CBC W/AUTO DIFF WBC: CPT | Performed by: ADVANCED PRACTICE MIDWIFE

## 2017-10-13 PROCEDURE — 85027 COMPLETE CBC AUTOMATED: CPT | Performed by: ADVANCED PRACTICE MIDWIFE

## 2017-10-13 PROCEDURE — 3E033VJ INTRODUCTION OF OTHER HORMONE INTO PERIPHERAL VEIN, PERCUTANEOUS APPROACH: ICD-10-PCS | Performed by: ADVANCED PRACTICE MIDWIFE

## 2017-10-13 PROCEDURE — 25000125 ZZHC RX 250: Performed by: ADVANCED PRACTICE MIDWIFE

## 2017-10-13 PROCEDURE — 72200001 ZZH LABOR CARE VAGINAL DELIVERY SINGLE

## 2017-10-13 PROCEDURE — 86780 TREPONEMA PALLIDUM: CPT | Performed by: ADVANCED PRACTICE MIDWIFE

## 2017-10-13 RX ORDER — OXYTOCIN 10 [USP'U]/ML
10 INJECTION, SOLUTION INTRAMUSCULAR; INTRAVENOUS
Status: DISCONTINUED | OUTPATIENT
Start: 2017-10-13 | End: 2017-10-15 | Stop reason: HOSPADM

## 2017-10-13 RX ORDER — IBUPROFEN 400 MG/1
400-800 TABLET, FILM COATED ORAL EVERY 6 HOURS PRN
Status: DISCONTINUED | OUTPATIENT
Start: 2017-10-13 | End: 2017-10-15 | Stop reason: HOSPADM

## 2017-10-13 RX ORDER — OXYTOCIN/0.9 % SODIUM CHLORIDE 30/500 ML
100 PLASTIC BAG, INJECTION (ML) INTRAVENOUS CONTINUOUS
Status: DISCONTINUED | OUTPATIENT
Start: 2017-10-13 | End: 2017-10-15 | Stop reason: HOSPADM

## 2017-10-13 RX ORDER — OXYCODONE HYDROCHLORIDE 5 MG/1
5-10 TABLET ORAL
Status: DISCONTINUED | OUTPATIENT
Start: 2017-10-13 | End: 2017-10-15 | Stop reason: HOSPADM

## 2017-10-13 RX ORDER — OXYTOCIN/0.9 % SODIUM CHLORIDE 30/500 ML
1-24 PLASTIC BAG, INJECTION (ML) INTRAVENOUS CONTINUOUS
Status: DISCONTINUED | OUTPATIENT
Start: 2017-10-13 | End: 2017-10-13

## 2017-10-13 RX ORDER — LIDOCAINE HYDROCHLORIDE 10 MG/ML
INJECTION, SOLUTION EPIDURAL; INFILTRATION; INTRACAUDAL; PERINEURAL
Status: DISCONTINUED
Start: 2017-10-13 | End: 2017-10-14 | Stop reason: HOSPADM

## 2017-10-13 RX ORDER — MISOPROSTOL 200 UG/1
TABLET ORAL
Status: DISCONTINUED
Start: 2017-10-13 | End: 2017-10-14 | Stop reason: HOSPADM

## 2017-10-13 RX ORDER — HYDROCORTISONE 2.5 %
CREAM (GRAM) TOPICAL 3 TIMES DAILY PRN
Status: DISCONTINUED | OUTPATIENT
Start: 2017-10-13 | End: 2017-10-15 | Stop reason: HOSPADM

## 2017-10-13 RX ORDER — LANOLIN 100 %
OINTMENT (GRAM) TOPICAL
Status: DISCONTINUED | OUTPATIENT
Start: 2017-10-13 | End: 2017-10-15 | Stop reason: HOSPADM

## 2017-10-13 RX ORDER — NALOXONE HYDROCHLORIDE 0.4 MG/ML
.1-.4 INJECTION, SOLUTION INTRAMUSCULAR; INTRAVENOUS; SUBCUTANEOUS
Status: DISCONTINUED | OUTPATIENT
Start: 2017-10-13 | End: 2017-10-15 | Stop reason: HOSPADM

## 2017-10-13 RX ORDER — OXYTOCIN/0.9 % SODIUM CHLORIDE 30/500 ML
PLASTIC BAG, INJECTION (ML) INTRAVENOUS
Status: DISCONTINUED
Start: 2017-10-13 | End: 2017-10-14 | Stop reason: HOSPADM

## 2017-10-13 RX ORDER — AMOXICILLIN 250 MG
1-2 CAPSULE ORAL 2 TIMES DAILY
Status: DISCONTINUED | OUTPATIENT
Start: 2017-10-13 | End: 2017-10-15 | Stop reason: HOSPADM

## 2017-10-13 RX ORDER — MISOPROSTOL 200 UG/1
400 TABLET ORAL
Status: DISCONTINUED | OUTPATIENT
Start: 2017-10-13 | End: 2017-10-15 | Stop reason: HOSPADM

## 2017-10-13 RX ORDER — OXYTOCIN/0.9 % SODIUM CHLORIDE 30/500 ML
340 PLASTIC BAG, INJECTION (ML) INTRAVENOUS CONTINUOUS PRN
Status: DISCONTINUED | OUTPATIENT
Start: 2017-10-13 | End: 2017-10-15 | Stop reason: HOSPADM

## 2017-10-13 RX ORDER — BISACODYL 10 MG
10 SUPPOSITORY, RECTAL RECTAL DAILY PRN
Status: DISCONTINUED | OUTPATIENT
Start: 2017-10-15 | End: 2017-10-15 | Stop reason: HOSPADM

## 2017-10-13 RX ORDER — ACETAMINOPHEN 325 MG/1
650 TABLET ORAL EVERY 4 HOURS PRN
Status: DISCONTINUED | OUTPATIENT
Start: 2017-10-13 | End: 2017-10-15 | Stop reason: HOSPADM

## 2017-10-13 RX ORDER — OXYTOCIN 10 [USP'U]/ML
INJECTION, SOLUTION INTRAMUSCULAR; INTRAVENOUS
Status: DISCONTINUED
Start: 2017-10-13 | End: 2017-10-14 | Stop reason: HOSPADM

## 2017-10-13 RX ORDER — LIDOCAINE 40 MG/G
CREAM TOPICAL
Status: DISCONTINUED | OUTPATIENT
Start: 2017-10-13 | End: 2017-10-13

## 2017-10-13 RX ADMIN — SODIUM CHLORIDE, POTASSIUM CHLORIDE, SODIUM LACTATE AND CALCIUM CHLORIDE: 600; 310; 30; 20 INJECTION, SOLUTION INTRAVENOUS at 16:39

## 2017-10-13 RX ADMIN — Medication 2.5 MILLION UNITS: at 16:39

## 2017-10-13 RX ADMIN — Medication 2.5 MILLION UNITS: at 08:07

## 2017-10-13 RX ADMIN — OXYTOCIN-SODIUM CHLORIDE 0.9% IV SOLN 30 UNIT/500ML 340 ML/HR: 30-0.9/5 SOLUTION at 22:07

## 2017-10-13 RX ADMIN — Medication 2.5 MILLION UNITS: at 20:48

## 2017-10-13 RX ADMIN — FENTANYL CITRATE 50 MCG: 50 INJECTION, SOLUTION INTRAMUSCULAR; INTRAVENOUS at 22:32

## 2017-10-13 RX ADMIN — OXYTOCIN-SODIUM CHLORIDE 0.9% IV SOLN 30 UNIT/500ML 1 MILLI-UNITS/MIN: 30-0.9/5 SOLUTION at 07:44

## 2017-10-13 RX ADMIN — Medication 2.5 MILLION UNITS: at 12:52

## 2017-10-13 RX ADMIN — SODIUM CHLORIDE, POTASSIUM CHLORIDE, SODIUM LACTATE AND CALCIUM CHLORIDE: 600; 310; 30; 20 INJECTION, SOLUTION INTRAVENOUS at 07:44

## 2017-10-13 RX ADMIN — Medication 2.5 MILLION UNITS: at 00:36

## 2017-10-13 RX ADMIN — Medication 2.5 MILLION UNITS: at 04:34

## 2017-10-13 NOTE — PROGRESS NOTES
"S: Jasmin breathing through contractions, reports increased intensity, and vaginal pressure in hands and knees.  O: /83  Pulse 57  Temp 98  F (36.7  C) (Oral)  Resp 16  Ht 1.702 m (5' 7\")  Wt 91.6 kg (202 lb)  LMP 2017  BMI 31.64 kg/m2    Fetal Heart rate: 135 bpm with moderate variability, no accels, early decels present  Contractions: every 2-3 minutes, lasting 60 seconds, Palpate strong, and adequate resting tone  ROM: clear 38+ hours  Pelvic exam: Deferred  Pitocin at 3 mu  Antibiotics : Penicillin yes    A:  IUP @ 40+4 with pitocin augmentation of PROM  Fetal heart rate: Category 2 due to occasional variable decelerations  GBS positive    P:  Pitocin augmentation  Up ad [place] for comfort measures  Anticipate   Continue Penicillin prophylaxis for GBS  Evaluate in 2-3 hours    Yesy Alexander RN, SNM as a scribe for  Karina Mayer, DNP, APRN, CNM    "

## 2017-10-13 NOTE — PROGRESS NOTES
"  S: Contractions are more uncomfortable.  She is well supported with her  and .      O:  Blood pressure 133/83, pulse 57, temperature 98  F (36.7  C), temperature source Oral, resp. rate 16, height 1.702 m (5' 7\"), weight 91.6 kg (202 lb), last menstrual period 2017, not currently breastfeeding.  General appearance: uncomfortable with contractions    CONTRACTIONS: Contractions every 1-5 minutes.  Palpate: moderate  FETAL HEART TONES: baseline 135 with moderate FHR variability and    accelerations yes. Decelerations no.    NST: REACTIVE  CST: NOT DONE  ROM: clear fluid  PELVIC EXAM:deferred  Fetal Position:  Cephalic   Bloody show: No  Pitocin- none,  Antibiotics- PCN  Cervical ripening: N/A  ASSESSMENT:  ==============  IUP @ 40w4d early labor and good progress   Fetal Heart rate tracing Category category two  GBS- Positive      PLAN:  ===========  comfort measures prn   Pain medication as needed/desired   Prophylactic antibiotic for + GBS status  Anticipate   Continue Labor augmentation with Pitocin per protocol   reevaluate in 2-4 hours/PRN     Karina Mayer CNM    "

## 2017-10-13 NOTE — PROGRESS NOTES
"S: Jasmin continues to walk and sit on the birth ball.  Reports feel \"bored\" and that contractions are more present and uncomfortable but not strong.    O: /77  Pulse 57  Temp 98.1  F (36.7  C) (Oral)  Resp 16  Ht 1.702 m (5' 7\")  Wt 91.6 kg (202 lb)  LMP 2017  BMI 31.64 kg/m2  Continues to talk and walk through contractions    Contractions: every 2-4 minutes lasting 60-80 seconds, with moderate variability, accels present, decels absent  NST: Reactive  ROM: clear fluid 34 hours  Pelvic Exam: Deferred  Pitocin 4 mu  Antibiotics: Penicillin Yes    A:  iup @ 40+4 minimal/no progress and IOL for PROM @ 0400 on 10/12/17  Fetal Heart Rate tracing: Intermittent tracing due to positioning.    GBS-positive     P:  Pitocin augmentation  Pain management as needed, up to tub now, requesting SVE.  Plan to evaluate once out of the tub  Anticipate   Continue penicillin for GBS prophylaxis    Yesy Alexander RN, SNM  "

## 2017-10-13 NOTE — PROGRESS NOTES
"S: Reports some very mild \"cramping\" in bed.  Up now walking and feeling less cramping.  She has  for active labor and her spouse is also present.    O: /77  Pulse 57  Temp 97.7  F (36.5  C) (Oral)  Resp 16  Ht 1.702 m (5' 7\")  Wt 91.6 kg (202 lb)  LMP 2017  BMI 31.64 kg/m2    Contractions : irregularly 2-3 in 15 minutes  Fetal heart tones: 150bpm with average variability, accels present, no decels  NST: Reactive  ROM: clear 32 hours  Pelvic Exam: Deferred  Pitocin: 3mu  Antibiotics: Penicillin Yes    A:  IUP @ 40+4 minimal/no progress and IOL for PROM @ 0400 on 10/12/17  Fetal Heart tracing: Category 1  GBS- positive    P:  Pitocin augmentation  Pain management as needed, has , desire to labor with hydrotherapy  Anticipate   Reevaluation in 2-3 hours    Yesy RN, SNM as a scribe for   Karina Mayer DNP, APRN, CNM    "

## 2017-10-13 NOTE — PROGRESS NOTES
"S: Jasmin reports increased contraction discomfort and pressure.  Recently out of tub.    O: /77  Pulse 57  Temp 98.1  F (36.7  C) (Oral)  Resp 16  Ht 1.702 m (5' 7\")  Wt 91.6 kg (202 lb)  LMP 2017  BMI 31.64 kg/m2  Breathing more with contractions    Contractions: every 2 minutes, lasting 60-80 seconds, with moderate variability, accels present, decels absent  ROM: clear 35+ hours, no temp  Pelvic exam: /-3 BBOW,cephalic, ballotable, sutures felt  Pitocin: 4mu  Antibiotics: Penicillin Yes    A:  IUP @ 40+4 with pitocin augmentation of PROM  Fetal Heart rate: Category 1 tracing  GBS Positive     P:  Pitocin augmentation  Up ad [place] for comfort/pain management  Anticipate   Continue penicillin prophylaxis for GBS    Yesy Alexander RN, SNM as a scribe for   Karina Mayer, DNP, APRN, CNM    "

## 2017-10-13 NOTE — PLAN OF CARE
Problem: Labor (Cervical Ripen, Induct, Augment) (Adult,Obstetrics,Pediatric)  Goal: Signs and Symptoms of Listed Potential Problems Will be Absent, Minimized or Managed (Labor)  Signs and symptoms of listed potential problems will be absent, minimized or managed by discharge/transition of care (reference Labor (Cervical Ripen, Induct, Augment) (Adult,Obstetrics,Pediatric) CPG).   Jasmin was able to sleep well during the night despite hourly IA. She feels the contractions are farther apart this morning, feels comfortable. Baby has been active when monitoring, baseline 130, increases heard, no decreases.

## 2017-10-13 NOTE — PLAN OF CARE
Problem: Labor (Cervical Ripen, Induct, Augment) (Adult,Obstetrics,Pediatric)  Goal: Signs and Symptoms of Listed Potential Problems Will be Absent, Minimized or Managed (Labor)  Signs and symptoms of listed potential problems will be absent, minimized or managed by discharge/transition of care (reference Labor (Cervical Ripen, Induct, Augment) (Adult,Obstetrics,Pediatric) CPG).   Outcome: No Change  Cervical ripening is continue. See flow sheet for fetal and uterine and fetal monitoring. Patient is on pitocin and still feel comfortable. She is up ambulating. Continue to monitor labor and notify the provider with changes.

## 2017-10-13 NOTE — PROVIDER NOTIFICATION
10/13/17 0010   Provider Notification   Provider Name/Title ZULEMA Goodrich   Method of Notification Phone   Request Evaluate - Remote     Notified provider due to pt request to be monitored less frequently than our policy (Q1h with intermittent auscultation during early labor). Provider wishes to continue with policy parameters and perform Q1 monitoring unless condition changes.

## 2017-10-13 NOTE — PROGRESS NOTES
"S: Pt has been up and walking, now back in bed for monitoring.  Reports feeling the contractions when she is up, less so while reclining or resting  O: /85  Pulse 70  Temp 97.5  F (36.4  C) (Oral)  Resp 16  Ht 1.702 m (5' 7\")  Wt 91.6 kg (202 lb)  LMP 01/02/2017  BMI 31.64 kg/m2   SVE deferred  FHTs 135, moderate variability, + accels  Cxt q 2-4 mins lasting 60 secs, cramps  Clear fluid  Has received 4 doses of misoprostil, last one at 1604  A: IUP @ 40w3d   Cat 1 FHT tracing  SROM x 16 hrs, GBS +, afebrile, abx prophylaxis  Desires low intervention  P: Discuss plan for night, reviewed ambien, vs vistaril, morphine and phenergan, will order prn for her choice  Will monitor for contractions, if contractions space out will give next dose of misoprostil    If pt begins active labor, then she will labor and have her baby tonight, otherwise have discussed possibility of pitocin in the morning.  Discussed  Risks and bens to giving more misoprostil if advanced dilation vs pitocin, explained the advantages to pitocin is titration to adequate labor instead of a tumultuous, tachysytole type labor.    Anabela Rodney CNM       "

## 2017-10-13 NOTE — PROGRESS NOTES
"  S: Feels more contractions.  Sitting on the birth ball.      O:  Blood pressure 125/77, pulse 57, temperature 98.1  F (36.7  C), temperature source Oral, resp. rate 16, height 1.702 m (5' 7\"), weight 91.6 kg (202 lb), last menstrual period 2017, not currently breastfeeding.  General appearance: comfortable    CONTRACTIONS: Contractions every 2-3 minutes.  Palpate: mild  FETAL HEART TONES: baseline 135 with moderate FHR variability and    accelerations yes. Decelerations no.    NST: REACTIVE  CST: NOT DONE  ROM: clear fluid  PELVIC EXAM:deferred  Fetal Position:  Cephalic  Bloody show: No  Pitocin- 4 mu/min.,  Antibiotics- PCN  Cervical ripening: N/A  ASSESSMENT:  ==============  IUP @ 40w4d IOL for PROM   Fetal Heart rate tracing Category category one  GBS- positive     PLAN:  ===========  comfort measures prn   Pain medication prn  Prophylactic antibiotic for + GBS status  Anticipate   Continue Labor induction with Pitocin per protocol    reevaluate in 2-4 hours/PRN     Karina Mayer CNM               "

## 2017-10-13 NOTE — PLAN OF CARE
Problem: Labor (Cervical Ripen, Induct, Augment) (Adult,Obstetrics,Pediatric)  Intervention: Position/Reposition to Promote Fetal Well-Being/Optimize Contraction Pattern  VSS. Contraction pattern too frequent to continue oral Miso. Last dose of PO miso given at 1600. Infant with good variability, mother comfortable through contractions. Continue with IA monitoring starting at 2000.

## 2017-10-13 NOTE — PROGRESS NOTES
"  S:Very comfortable.  \"Too comfortable\" per Jasmin.  Was able to sleep well.  She is ready to be in labor.  She is accompanied by her partner.    O:  Blood pressure 102/52, pulse 57, temperature 98  F (36.7  C), temperature source Oral, resp. rate 16, height 5' 7\" (1.702 m), weight 202 lb (91.6 kg), last menstrual period 2017, not currently breastfeeding.  General appearance: comfortable    CONTRACTIONS: Contractions every 7-9 minutes.  Palpate: cramping, patient does not feel them.    FETAL HEART TONES: baseline 135 with moderate FHR variability and    accelerations yes. Decelerations no.    NST: REACTIVE  CST: NOT DONE  ROM: clear fluid  PELVIC EXAM:deferred due for risk of infection.  Would not change plan based on exam.    Fetal Position:  Cephalic   Bloody show: No  Pitocin- none,  Antibiotics- none  Cervical ripening: oral misoprostol X 4  was used yesterday  ASSESSMENT:  ==============  IUP @ 40w4d minimal/no progress and IOL for PROM at 0400 on 10/12/17   Fetal Heart rate tracing Category category one  GBS- positive    PLAN:  ===========  comfort measures prn   Pain medication as needed/desired   Anticipate   Recommended Labor induction/augmentation with Pitocin.  Discussed risks and benefits.  Jasmin verbalizes understanding and agreement.    reevaluate in 2-4 hours/PRN     Karina Mayer CNM  "

## 2017-10-14 LAB — HGB BLD-MCNC: 10.9 G/DL (ref 11.7–15.7)

## 2017-10-14 PROCEDURE — 85018 HEMOGLOBIN: CPT | Performed by: ADVANCED PRACTICE MIDWIFE

## 2017-10-14 PROCEDURE — 36415 COLL VENOUS BLD VENIPUNCTURE: CPT | Performed by: ADVANCED PRACTICE MIDWIFE

## 2017-10-14 PROCEDURE — 25000132 ZZH RX MED GY IP 250 OP 250 PS 637: Performed by: ADVANCED PRACTICE MIDWIFE

## 2017-10-14 PROCEDURE — 12000028 ZZH R&B OB UMMC

## 2017-10-14 RX ORDER — ASPIRIN 81 MG
100 TABLET, DELAYED RELEASE (ENTERIC COATED) ORAL DAILY
Qty: 30 TABLET | Refills: 0 | Status: SHIPPED | OUTPATIENT
Start: 2017-10-14 | End: 2020-05-13

## 2017-10-14 RX ADMIN — SENNOSIDES AND DOCUSATE SODIUM 2 TABLET: 8.6; 5 TABLET ORAL at 09:07

## 2017-10-14 RX ADMIN — IBUPROFEN 800 MG: 400 TABLET ORAL at 15:27

## 2017-10-14 RX ADMIN — IBUPROFEN 800 MG: 400 TABLET ORAL at 22:03

## 2017-10-14 RX ADMIN — IBUPROFEN 800 MG: 400 TABLET ORAL at 00:51

## 2017-10-14 RX ADMIN — SENNOSIDES AND DOCUSATE SODIUM 2 TABLET: 8.6; 5 TABLET ORAL at 19:35

## 2017-10-14 RX ADMIN — IBUPROFEN 800 MG: 400 TABLET ORAL at 09:08

## 2017-10-14 NOTE — L&D DELIVERY NOTE
Delivery Summary    Jasmin Benavidez MRN# [id number]   Age: 37 year old YOB: 1980        Patient here with PROM 17 @ 0400 , received 4 doses of miso, and then augmentation/induction of labor with rupture of membranes.  Pitocin started at 0800 17.  Labored with pitocin all day making slow progress.  2251 called out with rupture of bulging forbag and quickly progressed to pushing.  Delivered standing at side of bed per patient preference through 2 contractions.   and spouse Cullen present with great support.  Perineum sustained second degree lac that was repaired.  Infant directly skin to skin, mother bonding well.     ASSESSMENT & PLAN:     [place]ins Assessment Tool Data    Gestational Age:  Gestational Age: 40w4d     Maternal temperature range:  Temp  Av.9  F (36.6  C)  Min: 97.4  F (36.3  C)  Max: 99.3  F (37.4  C)    Membranes ruptured for:   42h 11m     GBS status:  Lab Results   Component Value Date    GBS Positive (A) 2017       Antibiotic Status:  Antibiotics Penicillin       IV Antibiotic Given Greater than 4 hours prior to delivery       Additional Management      Fetal Status Prior to  Delivery Category 2    Fetal Status Comments         Sepsis Prebirth Score:       Sepsis Postbirth Score:       Determination based on clinical exam after birth:       Disposition:            Labor Event Times    Labor onset date:  10/13/17 Onset time:   3:15 PM   Dilation complete date:  10/13/17 Complete time:  10:04 PM   Start pushing date/time:  10/13/2017 2205            Labor Events     labor?:  No    steroids:  None   Labor Type:  Spontaneous, Augmentation   Predominate monitoring during 1st stage:  continuous electronic fetal monitoring      Antibiotics received during labor?:  Yes   Reason for Antibiotics:  GBS   Antibiotics received for GBS:  Penicillin   Antibiotics Given (GBS):  Greater than 4 hours prior to delivery      Rupture identifier:   Rupture 1   Rupture date/time: 10/12/17 0355   Rupture type:  Spontaneous rupture of membranes occuring during spontaneous labor or augmentation   Fluid color:  Clear   Fluid odor:  Normal      Augmentation:  Oxytocin   Indications for augmentation:  Prolonged ROM   1:1 continuous labor support provided by?:            Delivery/Placenta Date and Time    Delivery Date:  10/13/17 Delivery Time:  10:06 PM   Placenta Date/Time:  10/13/2017 10:12 PM   Oxytocin given at the time of delivery:  after delivery of baby      Vaginal Counts    Initial count performed by 2 team members:   Two Team Members   Edda Hayward CNM student          [employer]   Initial counts 2 0 5    Added to count 0 1 0    Final counts 2 1 5       Placed during labor Accounted for at the end of labor   No    No    No       Final count performed by 2 team members:   Two Team Members   Edda Hayward CNM STUDENT         Final count correct?:  Yes         Apgars    Living status:  Living    1 Minute 5 Minute 10 Minute 15 Minute 20 Minute   Skin color: 1  1       Heart rate: 2  2       Reflex irritability: 2  2       Muscle tone: 2  2       Respiratory effort: 1  2       Total: 8  9          Apgars assigned by:  DIVINE URBANO RN      Cord    Vessels:  3 Vessels Complications:  Nuchal         [place] Resuscitation    Methods:  Oximetry       Care at Delivery:   @ 2206. Infant dried and stimulated. Cried. Handed to mother then placed skin-to-skin on mothers abdomen. Pulse oximetry applied to right hand due to infant color. Sp02 96% at 5 minutes of life.      Skin to Skin and Feeding Plan    Skin to skin initiation date/time: 10/13/17 2207   Skin to skin with:  Mother   Skin to skin end date/time:        Labor Events and Shoulder Dystocia    Fetal Tracing Prior to Delivery:  Category 2   Shoulder dystocia present?:  Neg            Delivery (Maternal) (Provider to Complete) ([id number])     Perineal lacerations:  2nd Repaired?:  Yes   Vaginal laceration?:  No    Cervical laceration?:  No          Mother's Information  Mother: Jasmin Benavidez #[id number]    Start of Mother's Information     IO Blood Loss  10/13/17 1515 - 10/13/17 2311    Mom's I/O Activity            End of Mother's Information  Mother: Jasmin Benavidez #[id number]            Delivery - Provider to Complete ([id number])    Delivering clinician:  RYAN CADENA CNM Care:  Exclusive CNM care in labor   Attempted Delivery Types (Choose all that apply):  Spontaneous Vaginal Delivery   Delivery Type (Choose the 1 that will go to the Birth History):  Vaginal, Spontaneous Delivery                           Placenta    Delayed Cord Clamping:  Done   Date/Time:  10/13/2017 10:12 PM   Removal:  Spontaneous   Disposition:  Hospital disposal      Anesthesia    Method:  None         Presentation and Position    Presentation:  Vertex   Position:  Left Occiput Anterior                    Ryan Cadena CNM

## 2017-10-14 NOTE — PROGRESS NOTES
"Post Partum Note    Jasmin Benavidez      MRN#: [id number]  Age: 37 year old      YOB: 1980      Post-partum day #1    SIGNIFICANT PROBLEMS:  Patient Active Problem List    Diagnosis Date Noted      (normal spontaneous vaginal delivery) 10/13/2017     Priority: Medium     Labor and delivery indication for care or intervention 10/12/2017     Priority: Medium     Encounter for supervision of other normal pregnancy in third trimester 2017     Priority: Medium     Pregnancy, incidental 2017     Priority: Medium     FOB: Cullen     Had to put her mother on hospice this month (April) stressed and sad about that. Also buying a house and moving in  and selling their condo.        Moderate major depression (HCC) 2013     Priority: Medium     Zoloft 50mg daily  9/30/15  PHQ9 @ 36 weeks; 2    3/2017 - Doing well with Zoloft           INTERVAL HISTORY:  /56  Pulse 60  Temp 98.8  F (37.1  C) (Oral)  Resp 16  Ht 1.702 m (5' 7\")  Wt 91.6 kg (202 lb)  LMP 2017  Breastfeeding? Unknown  BMI 31.64 kg/m2    Pt stable, baby is rooming in  Breast feeding status:initiated and well established  Complications since 2 hours post delivery: None  Patient is tolerating acitivity well Voiding without difficulty, cramping is relieved by Ibuprophen, lochia is decreasing and patient denies clots.  Perineal pain is is relieved by Ibuprophen.  The perineum laceration is well approximated with minimal swelling.  Most discomfort from hemorrhoids.     Normal postpartum exam     Postpartum hemoglobin   Hemoglobin   Date Value Ref Range Status   10/14/2017 10.9 (L) 11.7 - 15.7 g/dL Final     Blood type   Lab Results   Component Value Date    ABO O 10/12/2017       Lab Results   Component Value Date    RH Pos 10/12/2017     Rubella status No results found for: RUBELLAABIGG    ASSESSMENT/PLAN:  Stable Post-partum day #1  Complications:none  Plan d/c home tomorrow.  Desires to colace for " discharge.  RTC 6 weeks or in 8 weeks if decides on IUD  Teaching done: D/C Instructions: Nutrition/Activity, Engorgement Management, Birth Control Options, Warning Signs/When to Call: Excessive Bleeding, Infection, PP Depression, RTC Clinic for PP Appointment and PNV    Postpartum warning s/s reviewed, including bleeding/clots, fever, mastitis, or depression    Birthcontrol planned: Condoms while breastfeeding.  Considering IUD.  Discussed recommendations for spacing.    Current Discharge Medication List      CONTINUE these medications which have NOT CHANGED    Details   Prenatal Vit-Fe Fumarate-FA (PRENATAL MULTIVITAMIN  PLUS IRON) 27-0.8 MG TABS Take 1 tablet by mouth daily      sertraline (ZOLOFT) 50 MG tablet Take 1 tablet (50 mg) by mouth daily  Qty: 90 tablet, Refills: 3    Associated Diagnoses: Major depressive disorder, single episode, moderate (H)               Yesy Alexander RN, SNM

## 2017-10-14 NOTE — PROGRESS NOTES
"  S:Contractions are stronger.  No headache, blurring of vision or epigastric pain.      O:  Blood pressure 154/78, pulse 57, temperature 98.3  F (36.8  C), temperature source Oral, resp. rate 16, height 1.702 m (5' 7\"), weight 91.6 kg (202 lb), last menstrual period 2017, not currently breastfeeding.  General appearance: uncomfortable with contractions    CONTRACTIONS: Contractions every 1.5-3 minutes.  Palpate: moderate  FETAL HEART TONES: baseline 135 with moderate FHR variability and    accelerations yes. Decelerations no.    NST: REACTIVE  CST: NOT DONE  ROM: clear fluid  PELVIC EXAM:PELVIC EXAM: 5.5/ 90%/ Mid/ soft/ -2   Fetal Position:  Cephalic  Bloody show: No  Pitocin- 6 mu/min.,  Antibiotics- PCN  Cervical ripening: N/A  ASSESSMENT:  ==============  IUP @ 40w4d early labor - induction for PROM  Fetal Heart rate tracing Category category one  GBS- positive  Elevated blood pressures - R/O preeclampsia vs elevated blood pressures due to the discomfort     PLAN:  ===========  comfort measures prn   Pain medication as needed/desired   Anticipate   Continue Labor augmentation with Pitocin per protocol.    reevaluate in 2-4 hours/PRN  Preeclampsia labs ordered due to elevated blood pressures       Karina Mayer CNM  "

## 2017-10-14 NOTE — PROGRESS NOTES
[facility], [place]    Post-Partum Progress Note    Assessment & Plan   Assessment:  Post-partum day #1  Normal spontaneous vaginal delivery    Doing well.  Normal healing laceration.  No excessive bleeding  Pain well-controlled.  Tolerating activity well.  Breastfeeding well.   No concerns today.     Plan:  Ambulation encouraged  Breast feeding strategies discussed  Lactation consultation  Pain control measures as needed  Reportable signs and symptoms dicussed with the patient  Anticipate discharge tomorrow  Discharge instructions discussed.   Discharge medications ordered, RX for colace.   Plans condoms for birth control but also considering IUD.     Debbie Kwon     Interval History   Doing well.  Pain is well-controlled.  No fevers.  No history of foul-smelling vaginal discharge.  Good appetite.  Denies chest pain, shortness of breath, nausea or vomiting.  Vaginal bleeding is similar to a heavy menstrual flow.  Ambulatory.  Breastfeeding well.    Medications     oxytocin in 0.9% NaCl Stopped (10/14/17 0100)     oxytocin in 0.9% NaCl 340 mL/hr (10/13/17 2207)     NO Rho (D) immune globulin (RhoGam) needed - mother Rh POSITIVE       nitrous oxide/oxygen 50/50 blend       - MEDICATION INSTRUCTIONS -       - MEDICATION INSTRUCTIONS -         senna-docusate  1-2 tablet Oral BID       Physical Exam   Temp: 98.8  F (37.1  C) Temp src: Oral BP: 109/56 Pulse: 60   Resp: 16        Vitals:    10/12/17 0749   Weight: 91.6 kg (202 lb)     Vital Signs with Ranges  Temp:  [97.8  F (36.6  C)-99.3  F (37.4  C)] 98.8  F (37.1  C)  Pulse:  [60-77] 60  Resp:  [16] 16  BP: (109-160)/(56-93) 109/56  I/O last 3 completed shifts:  In: -   Out: 442 [Urine:350; Blood:92]    Uterine fundus is firm, non-tender and at the level of the umbilicus    Data   Recent Labs   Lab Test  10/12/17   0715   ABO  O   RH  Pos   AS  Neg     Recent Labs   Lab Test  10/14/17   0744  10/13/17   2041   HGB  10.9*  11.1*      Recent Labs   Lab Test  03/08/17   0840   RUQIGG  43     Debbie Kwon [employer]

## 2017-10-14 NOTE — PLAN OF CARE
Problem: Patient Care Overview  Goal: Plan of Care/Patient Progress Review  Outcome: Declining  Patient arriving to room at 0050, vss. Orientation to room completed. Motrin given for perineum pain. Will continue to monitor

## 2017-10-14 NOTE — PLAN OF CARE
Problem: Labor (Cervical Ripen, Induct, Augment) (Adult,Obstetrics,Pediatric)  Goal: Signs and Symptoms of Listed Potential Problems Will be Absent, Minimized or Managed (Labor)  Signs and symptoms of listed potential problems will be absent, minimized or managed by discharge/transition of care (reference Labor (Cervical Ripen, Induct, Augment) (Adult,Obstetrics,Pediatric) CPG).   Outcome: Improving  Contractions increased in intensity and frequency. Pt complete at 2204 and pushing at 2205. Baby boy born at 2206. Placenta delivered at 2212. 2nd degree tear repaired. QBL 131ml. Fundus midline, firm, U1 with small bleeding.

## 2017-10-14 NOTE — PLAN OF CARE
Problem: Patient Care Overview  Goal: Plan of Care/Patient Progress Review  Outcome: Improving  Data: Vital signs and postpartum checks WDL - see flow record. Patient eating and drinking normally. Patient able to empty bladder independently and is up ambulating.  Patient performing self cares and is able to care for infant.  Action: Patient medicated during the shift for pain. Patient education done see education record.  Response: Positive attachment behaviors observed with infant. Support persons are present.    Plan: Continue with the plan of care and notify provider if decline in status. Will continue to monitor and assess.

## 2017-10-14 NOTE — PROGRESS NOTES
"S: Jasmin breathing well with contractions.  Continues on ball and using sling. Denies headache, blurry vision, epigastric pain.    O: /78  Pulse 57  Temp 98.3  F (36.8  C) (Oral)  Resp 16  Ht 1.702 m (5' 7\")  Wt 91.6 kg (202 lb)  LMP 2017  BMI 31.64 kg/m2    Several elevated BP's over 140/80 all while in hands and knees, or bouncing on the birth ball.  Repeat Blood pressure in bed 133/72      DTR- +2, no clonus, trace pedal edema  Fetal heart rate: 140bpm with moderate variability, accels present, no decels  NST: reactive  CST: negative  Contractions: every 2-3 minutes, lasting 60 seconds, palpate strong, and adequate resting tone  ROM: clear 40+ hours  Pelvic exam: Deferred  Pitocin 6 mu  Antibiotics: Penicillin yes    A:  IUP @ 40+4 with pitocin augmentation of PROM  Fetal heart rate: Category 1  GBS: positive    P:  Continue with pitocin augmentation  Up ad [place] for comfort measures  Anticipate   Continue Penicillin prophylaxis for GBS  Evaluate in 2-3 hours  Labs now: CBC, LFT, Protein creatinine ratio - results WNL      Yesy Alexander RN, SNM as a scribe for Karina Mayer DNP, APRN, CNM          "

## 2017-10-14 NOTE — PLAN OF CARE
Problem: Patient Care Overview  Goal: Plan of Care/Patient Progress Review  Outcome: Improving  Needing minimal assistance latching baby onto breasts. Fundus firm and midline, U1. Motrin given for uterine cramping, relief obtained. Vss

## 2017-10-14 NOTE — PLAN OF CARE
Problem: Postpartum (Vaginal Delivery) (Adult,Obstetrics,Pediatric)  Goal: Signs and Symptoms of Listed Potential Problems Will be Absent, Minimized or Managed (Postpartum)  Signs and symptoms of listed potential problems will be absent, minimized or managed by discharge/transition of care (reference Postpartum (Vaginal Delivery) (Adult,Obstetrics,Pediatric) CPG).   Outcome: Improving  Data: Jasmin Benavidez transferred to 7130 via wheelchair at 0030. Baby transferred via parent's arms.  Action: Receiving unit notified of transfer: Yes. Patient and family notified of room change. Report given to Leidy GALEANA at 0035. Belongings sent to receiving unit. Accompanied by Registered Nurse. Oriented patient to surroundings. Call light within reach. ID bands double-checked with receiving RN.  Response: Patient tolerated transfer and is stable.

## 2017-10-15 VITALS
HEART RATE: 56 BPM | TEMPERATURE: 97.8 F | HEIGHT: 67 IN | WEIGHT: 202 LBS | SYSTOLIC BLOOD PRESSURE: 118 MMHG | DIASTOLIC BLOOD PRESSURE: 83 MMHG | BODY MASS INDEX: 31.71 KG/M2 | RESPIRATION RATE: 16 BRPM

## 2017-10-15 PROCEDURE — 25000132 ZZH RX MED GY IP 250 OP 250 PS 637: Performed by: ADVANCED PRACTICE MIDWIFE

## 2017-10-15 RX ADMIN — SENNOSIDES AND DOCUSATE SODIUM 2 TABLET: 8.6; 5 TABLET ORAL at 10:00

## 2017-10-15 RX ADMIN — IBUPROFEN 800 MG: 400 TABLET ORAL at 05:31

## 2017-10-15 RX ADMIN — IBUPROFEN 800 MG: 400 TABLET ORAL at 12:52

## 2017-10-15 NOTE — PLAN OF CARE
Data: Vital signs stable, assessments within normal limits.   Action: Review of care plan, teaching, and discharge instructions done with Patient. Infant and mother identification with ID bands done and verification with patient signature obtained.  Response: Patient states understanding and is comfortable with infant cares and feeding. Questions about postpartum and baby care addressed.  Patient and baby discharged to home.

## 2017-10-15 NOTE — PLAN OF CARE
Problem: Patient Care Overview  Goal: Plan of Care/Patient Progress Review  Outcome: Improving  Motrin given for perineum pain, relief obtained. Fundus firm and midline, U1. Breastfeeding independently on cue. Hand expression demonstrated, good amount of colostrum expressed onto spoon. Vss

## 2017-10-15 NOTE — DISCHARGE SUMMARY
"Post Partum Note  SIGNIFICANT PROBLEMS:  Patient Active Problem List    Diagnosis Date Noted      (normal spontaneous vaginal delivery) 10/13/2017     Priority: Medium     Labor and delivery indication for care or intervention 10/12/2017     Priority: Medium     Encounter for supervision of other normal pregnancy in third trimester 2017     Priority: Medium     Pregnancy, incidental 2017     Priority: Medium     FOB: [place]     Had to put her mother on hospice this month (April) stressed and sad about that. Also buying a house and moving in  and selling their condo.        Moderate major depression (HCC) 2013     Priority: Medium     Zoloft 50mg daily  9/30/15  PHQ9 @ 36 weeks; 2    3/2017 - Doing well with Zoloft           INTERVAL HISTORY:  /84  Pulse 60  Temp 97.6  F (36.4  C) (Oral)  Resp 18  Ht 1.702 m (5' 7\")  Wt 91.6 kg (202 lb)  LMP 2017  Breastfeeding? Unknown  BMI 31.64 kg/m2  Pt stable, baby is rooming in  Breast feeding status:initiated  Complications since 2 hours post delivery: None  Patient is tolerating acitivity well Voiding without difficulty, cramping is relieved by Ibuprophen, lochia is decreasing and patient denies clots.  Perineal pain is is relieved by Ibuprophen.  The perineum laceration is well approximated    Postpartum hemoglobin   Hemoglobin   Date Value Ref Range Status   10/14/2017 10.9 (L) 11.7 - 15.7 g/dL Final     Blood type   Lab Results   Component Value Date    ABO O 10/12/2017       Lab Results   Component Value Date    RH Pos 10/12/2017     Rubella status immune      ASSESSMENT/PLAN:  Normal postpartum exam   Stable Post-partum day #2  Complications:none  Plan d/c home today  RTC 8 weeks, plan IUD insertion  Teaching done: D/C Instructions: Nutrition/Activity, Engorgement Management, Birth Control Options, Warning Signs/When to Call: Excessive Bleeding, Infection, PP Depression, Kegals and Crunches, RTC Clinic for PP Appointment and " PNV  Birthcontrol planned:Condoms and IUD Mirena, plan to insert at 6-8 wks postpartum  Current Discharge Medication List      START taking these medications    Details   docusate sodium (COLACE) 100 MG tablet Take 100 mg by mouth daily  Qty: 30 tablet, Refills: 0    Associated Diagnoses:  (normal spontaneous vaginal delivery)         CONTINUE these medications which have NOT CHANGED    Details   Prenatal Vit-Fe Fumarate-FA (PRENATAL MULTIVITAMIN  PLUS IRON) 27-0.8 MG TABS Take 1 tablet by mouth daily      sertraline (ZOLOFT) 50 MG tablet Take 1 tablet (50 mg) by mouth daily  Qty: 90 tablet, Refills: 3    Associated Diagnoses: Major depressive disorder, single episode, moderate (H)           Karina Mayer, DNP, APRN, CNM

## 2017-10-16 LAB — T PALLIDUM IGG+IGM SER QL: NEGATIVE

## 2017-10-17 ENCOUNTER — MYC MEDICAL ADVICE (OUTPATIENT)
Dept: FAMILY MEDICINE | Facility: CLINIC | Age: 37
End: 2017-10-17

## 2017-10-17 DIAGNOSIS — O92.29 SORE NIPPLES DUE TO LACTATION: ICD-10-CM

## 2017-10-17 PROBLEM — Z67.40 BLOOD TYPE O+: Status: ACTIVE | Noted: 2017-10-17

## 2017-12-12 ENCOUNTER — PRENATAL OFFICE VISIT (OUTPATIENT)
Dept: MIDWIFE SERVICES | Facility: CLINIC | Age: 37
End: 2017-12-12
Payer: COMMERCIAL

## 2017-12-12 VITALS
TEMPERATURE: 97.5 F | HEART RATE: 59 BPM | SYSTOLIC BLOOD PRESSURE: 117 MMHG | BODY MASS INDEX: 27.72 KG/M2 | DIASTOLIC BLOOD PRESSURE: 79 MMHG | WEIGHT: 177 LBS

## 2017-12-12 DIAGNOSIS — Z30.430 ENCOUNTER FOR IUD INSERTION: Primary | ICD-10-CM

## 2017-12-12 LAB — BETA HCG QUAL IFA URINE: NEGATIVE

## 2017-12-12 PROCEDURE — 58300 INSERT INTRAUTERINE DEVICE: CPT | Performed by: ADVANCED PRACTICE MIDWIFE

## 2017-12-12 PROCEDURE — 84703 CHORIONIC GONADOTROPIN ASSAY: CPT | Performed by: ADVANCED PRACTICE MIDWIFE

## 2017-12-12 PROCEDURE — 99207 ZZC POST PARTUM EXAM: CPT | Performed by: ADVANCED PRACTICE MIDWIFE

## 2017-12-12 ASSESSMENT — PATIENT HEALTH QUESTIONNAIRE - PHQ9: SUM OF ALL RESPONSES TO PHQ QUESTIONS 1-9: 4

## 2017-12-12 NOTE — MR AVS SNAPSHOT
After Visit Summary   12/12/2017    Jasmin Benavidez    MRN: [id number]           Patient Information     Date Of Birth          1980        Visit Information        Provider Department      12/12/2017 3:15 PM Karina Mayer CNM [place]        Today's Diagnoses     Encounter for IUD insertion    -  1    Routine postpartum follow-up           Follow-ups after your visit        Who to contact     If you have questions or need follow up information about today's clinic visit or your schedule please contact [facility] directly at [phone].  Normal or non-critical lab and imaging results will be communicated to you by JobSlothart, letter or phone within 4 business days after the clinic has received the results. If you do not hear from us within 7 days, please contact the clinic through Tradiert or phone. If you have a critical or abnormal lab result, we will notify you by phone as soon as possible.  Submit refill requests through [employer] or call your pharmacy and they will forward the refill request to us. Please allow 3 business days for your refill to be completed.          Additional Information About Your Visit        MyChart Information     [employer] gives you secure access to your electronic health record. If you see a primary care provider, you can also send messages to your care team and make appointments. If you have questions, please call your primary care clinic.  If you do not have a primary care provider, please call [phone] and they will assist you.        Care EveryWhere ID     This is your Care EveryWhere ID. This could be used by other organizations to access your [facility] medical records  [id number]        Your Vitals Were     Pulse Temperature Last Period BMI (Body Mass Index)          59 97.5  F (36.4  C) (Oral) 01/02/2017 27.72 kg/m2         Blood Pressure from Last 3 Encounters:   12/12/17 117/79   10/15/17 118/83   10/11/17 121/77     Weight from Last 3 Encounters:   17 177 lb (80.3 kg)   10/12/17 202 lb (91.6 kg)   10/11/17 202 lb 1.6 oz (91.7 kg)              We Performed the Following     Beta HCG qual IFA urine        Primary Care Provider Office Phone # Fax #    Stephany Noriega [phone]       [address]  [facility] [address]        Equal Access to Services     NAGA VANEGAS : Hadii aad ku hadasho Soomaali, waaxda luqadaha, qaybta kaalmada adeegyada, waxay idiin hayaan adeeg kharash la'gage . So [facility] [phone].    ATENCIÓN: Si rafaella espsoila, tiene a gay disposición servicios gratuitos de asistencia lingüística. Ll[facility] [phone].    We comply with applicable federal civil rights laws and [place] laws. We do not discriminate on the basis of race, color, national origin, age, disability, sex, sexual orientation, or gender identity.            Thank you!     Thank you for choosing [facility]  for your care. Our goal is always to provide you with excellent care. Hearing back from our patients is one way we can continue to improve our services. Please take a few minutes to complete the written survey that you may receive in the mail after your visit with us. Thank you!             Your Updated Medication List - Protect others around you: Learn how to safely use, store and throw away your medicines at www.disposemymeds.org.          This list is accurate as of: 17  4:10 PM.  Always use your most recent med list.                   Brand Name Dispense Instructions for use Diagnosis    APNO ointment Crea cream     30 g    Apply small layer to each nipple after each feeding and cover with saran wrap.  No need to wipe off before nursing.    Sore nipples due to lactation       docusate sodium 100 MG tablet    COLACE    30 tablet    Take 100 mg by mouth daily     (normal spontaneous vaginal delivery)       prenatal multivitamin plus iron 27-0.8 MG Tabs per tablet      Take 1 tablet by  mouth daily        sertraline 50 MG tablet    ZOLOFT    90 tablet    Take 1 tablet (50 mg) by mouth daily    Major depressive disorder, single episode, moderate (H)

## 2017-12-12 NOTE — PROGRESS NOTES
"SUBJECTIVE:  is here for a 6-week postpartum checkup.    Delivery date was 10/15/17. She had a  of a viable boy, weight 8 pounds 14 oz., with no complications.  Since delivery, she has been breast feeding.  She has no signs of infection, bleeding or other complications.    We discussed contraception and she has chosen Mirena IUD.  She  has not had intercourse since delivery and complains of No discomfort. Patient screened for postpartum depression and complaints are NEGATIVE. Screening has also been completed for intimate partner violence.    EXAM:  GENERAL healthy, alert, active, no distress, cooperative and smiling  RESP: Clear to auscultation  BREASTS: NEGATIVE, lactating, no masses  CV: NEGATIVE  GYN PELVIC: NEGATIVE, laceration well healed, very small \"dot\" of granulation tissue  PSYCH: NEGATIVE  Today's Depression Rating was 3    ASSESSMENT:   Normal postpartum exam after .  Appropriate for IUD    PLAN:  Return as needed or at time interval for next routine pap, pelvic, or breast exam.  Encourage Kegals and abdominal exercise.  Continue a multi vitamin supplement, especially if breastfeeding.  Pap smear was not obtained.  Post partum Hgb was not obtained.    Karina Mayer, DNP, APRN, CNM     INDICATIONS:                                                      Is a pregnancy test required: Yes.  Was it positive or negative?  Negative  Was a consent obtained?  Yes    Jasmin Benavidez is a 37 year old female,   who presents for insertion of an IUD. The risks, benefits and alternatives of IUD insertion were discussed in detail previously. She also has reviewed the product brochure.  She has elected to go ahead with the insertion  today and her questions were answered. Consent was signed. . A pregnancy test was performed today:  Yes    Today's PHQ-2 Score:   PHQ-2 (  [employer]) 3/19/2015   Q1: Little interest or pleasure in doing things 0   Q2: Feeling down, depressed or hopeless 0   PHQ-2 Score 0 "       PROCEDURE:                                                      The pelvic exam revealed normal external genitalia. On bimanual exam the uterus was Anteverted and Midposition and normal in size with no tenderness present. A speculum was inserted into the vagina and the cervix was visualized. The cervix was prepped with Betadine . The anterior lip of the cervix was grasped with a single toothed tenaculum. The uterus sounded to 7 cm. A Mirena IUD was then inserted without difficulty. The string was cut to 3 cm.  The patient experienced a mild amount of cramping.    POST PROCEDURE:                                                      She  tolerated the procedure well. There were no complications. Patient was discharged in stable condition.    Return to clinic in 2-5 weeks for IUD check.  Call if severe cramping, fever, abnormal bleeding, abnormal discharge or pelvic pain develop.  Patient was counseled about the chance of irregular bleeding.    Karina Mayer CNM

## 2017-12-12 NOTE — NURSING NOTE
"Chief Complaint   Patient presents with     IUD       Initial /79  Pulse 59  Temp 97.5  F (36.4  C) (Oral)  Wt 177 lb (80.3 kg)  LMP 2017  BMI 27.72 kg/m2 Estimated body mass index is 27.72 kg/(m^2) as calculated from the following:    Height as of 10/12/17: 5' 7\" (1.702 m).    Weight as of this encounter: 177 lb (80.3 kg).  BP completed using cuff size: regular        The following HM Due: NONE      The following patient reported/Care Every where data was sent to:  P ABSTRACT QUALITY INITIATIVES [[id number]]        N/a      Ramona Barroso MA       lot# [id number] NDC#[id number] EXP#           "

## 2017-12-14 ENCOUNTER — NURSE TRIAGE (OUTPATIENT)
Dept: NURSING | Facility: CLINIC | Age: 37
End: 2017-12-14

## 2017-12-14 ENCOUNTER — HOSPITAL ENCOUNTER (EMERGENCY)
Facility: CLINIC | Age: 37
Discharge: HOME | End: 2017-12-14
Attending: EMERGENCY MEDICINE | Admitting: EMERGENCY MEDICINE
Payer: COMMERCIAL

## 2017-12-14 VITALS
RESPIRATION RATE: 14 BRPM | SYSTOLIC BLOOD PRESSURE: 123 MMHG | HEIGHT: 67 IN | OXYGEN SATURATION: 97 % | HEART RATE: 68 BPM | BODY MASS INDEX: 28.25 KG/M2 | WEIGHT: 180 LBS | TEMPERATURE: 98 F | DIASTOLIC BLOOD PRESSURE: 77 MMHG

## 2017-12-14 DIAGNOSIS — R11.2 NON-INTRACTABLE VOMITING WITH NAUSEA, UNSPECIFIED VOMITING TYPE: ICD-10-CM

## 2017-12-14 LAB
ALBUMIN SERPL-MCNC: 3.5 G/DL (ref 3.4–5)
ALP SERPL-CCNC: 114 U/L (ref 40–150)
ALT SERPL W P-5'-P-CCNC: 55 U/L (ref 0–50)
ANION GAP SERPL CALCULATED.3IONS-SCNC: 8 MMOL/L (ref 3–14)
AST SERPL W P-5'-P-CCNC: 23 U/L (ref 0–45)
BASOPHILS # BLD AUTO: 0 10E9/L (ref 0–0.2)
BASOPHILS NFR BLD AUTO: 0.1 %
BILIRUB SERPL-MCNC: 0.4 MG/DL (ref 0.2–1.3)
BUN SERPL-MCNC: 14 MG/DL (ref 7–30)
CALCIUM SERPL-MCNC: 7.7 MG/DL (ref 8.5–10.1)
CHLORIDE SERPL-SCNC: 104 MMOL/L (ref 94–109)
CO2 SERPL-SCNC: 24 MMOL/L (ref 20–32)
CREAT SERPL-MCNC: 0.71 MG/DL (ref 0.52–1.04)
DIFFERENTIAL METHOD BLD: ABNORMAL
EOSINOPHIL # BLD AUTO: 0 10E9/L (ref 0–0.7)
EOSINOPHIL NFR BLD AUTO: 0 %
ERYTHROCYTE [DISTWIDTH] IN BLOOD BY AUTOMATED COUNT: 13.7 % (ref 10–15)
GFR SERPL CREATININE-BSD FRML MDRD: >90 ML/MIN/1.7M2
GLUCOSE SERPL-MCNC: 118 MG/DL (ref 70–99)
HCT VFR BLD AUTO: 39 % (ref 35–47)
HGB BLD-MCNC: 13.4 G/DL (ref 11.7–15.7)
IMM GRANULOCYTES # BLD: 0 10E9/L (ref 0–0.4)
IMM GRANULOCYTES NFR BLD: 0.1 %
INR PPP: 1.05 (ref 0.86–1.14)
LIPASE SERPL-CCNC: 147 U/L (ref 73–393)
LYMPHOCYTES # BLD AUTO: 0.4 10E9/L (ref 0.8–5.3)
LYMPHOCYTES NFR BLD AUTO: 6 %
MCH RBC QN AUTO: 28.6 PG (ref 26.5–33)
MCHC RBC AUTO-ENTMCNC: 34.4 G/DL (ref 31.5–36.5)
MCV RBC AUTO: 83 FL (ref 78–100)
MONOCYTES # BLD AUTO: 0.2 10E9/L (ref 0–1.3)
MONOCYTES NFR BLD AUTO: 3.1 %
NEUTROPHILS # BLD AUTO: 6.7 10E9/L (ref 1.6–8.3)
NEUTROPHILS NFR BLD AUTO: 90.7 %
NRBC # BLD AUTO: 0 10*3/UL
NRBC BLD AUTO-RTO: 0 /100
PLATELET # BLD AUTO: 245 10E9/L (ref 150–450)
POTASSIUM SERPL-SCNC: 3.7 MMOL/L (ref 3.4–5.3)
PROT SERPL-MCNC: 6.8 G/DL (ref 6.8–8.8)
RBC # BLD AUTO: 4.68 10E12/L (ref 3.8–5.2)
SODIUM SERPL-SCNC: 136 MMOL/L (ref 133–144)
WBC # BLD AUTO: 7.4 10E9/L (ref 4–11)

## 2017-12-14 PROCEDURE — 99284 EMERGENCY DEPT VISIT MOD MDM: CPT | Mod: 25

## 2017-12-14 PROCEDURE — 96374 THER/PROPH/DIAG INJ IV PUSH: CPT

## 2017-12-14 PROCEDURE — 25000128 H RX IP 250 OP 636: Performed by: EMERGENCY MEDICINE

## 2017-12-14 PROCEDURE — 85610 PROTHROMBIN TIME: CPT | Performed by: EMERGENCY MEDICINE

## 2017-12-14 PROCEDURE — 85025 COMPLETE CBC W/AUTO DIFF WBC: CPT | Performed by: EMERGENCY MEDICINE

## 2017-12-14 PROCEDURE — 83690 ASSAY OF LIPASE: CPT | Performed by: EMERGENCY MEDICINE

## 2017-12-14 PROCEDURE — 96361 HYDRATE IV INFUSION ADD-ON: CPT

## 2017-12-14 PROCEDURE — 80053 COMPREHEN METABOLIC PANEL: CPT | Performed by: EMERGENCY MEDICINE

## 2017-12-14 RX ORDER — ONDANSETRON 2 MG/ML
4 INJECTION INTRAMUSCULAR; INTRAVENOUS ONCE
Status: COMPLETED | OUTPATIENT
Start: 2017-12-14 | End: 2017-12-14

## 2017-12-14 RX ORDER — ONDANSETRON 4 MG/1
4 TABLET, ORALLY DISINTEGRATING ORAL EVERY 8 HOURS PRN
Qty: 10 TABLET | Refills: 0 | Status: SHIPPED | OUTPATIENT
Start: 2017-12-14 | End: 2020-05-13

## 2017-12-14 RX ORDER — ONDANSETRON 4 MG/1
4 TABLET, ORALLY DISINTEGRATING ORAL EVERY 8 HOURS PRN
Qty: 10 TABLET | Refills: 0 | Status: SHIPPED | OUTPATIENT
Start: 2017-12-14 | End: 2017-12-14

## 2017-12-14 RX ADMIN — ONDANSETRON 4 MG: 2 INJECTION INTRAMUSCULAR; INTRAVENOUS at 22:38

## 2017-12-14 RX ADMIN — SODIUM CHLORIDE 1000 ML: 9 INJECTION, SOLUTION INTRAVENOUS at 22:38

## 2017-12-14 ASSESSMENT — ENCOUNTER SYMPTOMS
DIARRHEA: 1
CONSTIPATION: 0
ARTHRALGIAS: 1
VOMITING: 1
NAUSEA: 1
MYALGIAS: 1
ROS GI COMMENTS: POSITIVE FOR HEMATEMESIS
HEADACHES: 1

## 2017-12-14 NOTE — ED AVS SNAPSHOT
Emergency Department    [address] [facility] [address]    Phone:  [phone]    Fax:  [phone]                                       Jasmin Benavidez   MRN: [id number]    Department:   Emergency Department   Date of Visit:  12/14/2017           After Visit Summary Signature Page     I have received my discharge instructions, and my questions have been answered. I have discussed any challenges I see with this plan with the nurse or doctor.    ..........................................................................................................................................  Patient/Patient Representative Signature      ..........................................................................................................................................  Patient Representative Print Name and Relationship to Patient    ..................................................               ................................................  Date                                            Time    ..........................................................................................................................................  Reviewed by Signature/Title    ...................................................              ..............................................  Date                                                            Time

## 2017-12-14 NOTE — ED AVS SNAPSHOT
"  Emergency Department    [address] [facility] [address]    Phone:  [phone]    Fax:  [phone]                                       Jasmin Benavidez   MRN: [id number]    Department:   Emergency Department   Date of Visit:  12/14/2017           Patient Information     Date Of Birth          1980        Your diagnoses for this visit were:     Non-intractable vomiting with nausea, unspecified vomiting type        You were seen by Henok Rios MD.      Follow-up Information     Follow up with Stephany Noriega MD In 5 days.    Specialty:  Family Practice    Contact information:    [address]  [facility] [address] [phone]          Follow up with  Emergency Department.    Specialty:  EMERGENCY MEDICINE    Why:  As needed, If symptoms worsen    Contact information:    [address] [facility] [address] [phone]        Discharge Instructions          * VOMITING [6yr-Adult]  Vomiting is a common symptom that may be due to different causes. These include gastroenteritis (\"stomach-flu\"), food poisoning and gastritis. There are other more serious causes of vomiting which may be hard to diagnose early in the illness. Therefore, it is important to watch for the warning signs listed below.  The main danger from repeated vomiting is \"dehydration\". This is due to excess loss of water and minerals from the body. When this occurs, body fluids must be replaced.`  HOME CARE:    If symptoms are severe, rest at home for the next 24 hours.    You may use acetaminophen (Tylenol) 650-1000 mg every 6 hours to control fever, unless another medicine was prescribed. [ NOTE : If you have chronic liver disease, talk with your doctor before using acetaminophen.] (Aspirin should never be used in anyone under 18 years of age who is ill with a fever. It may cause severe liver damage.)    Avoid tobacco and alcohol use, which may worsen your symptoms.    If medicines for " vomiting were prescribed, take as directed.  DURING THE FIRST 12-24 HOURS follow the diet below. Try to take frequent small sips even if you vomit occasionally:    FRUIT JUICES: Apple, grape juice, clear fruit drinks, electrolyte replacement and sports drinks.    BEVERAGES: Sport drinks such as Gatorade, soft drinks without caffeine; mineral water (plain or flavored), decaffeinated tea and coffee.    SOUPS: Clear broth, consommé and bouillon    DESSERTS: Plain gelatin (Jell-O), popsicles and fruit juice bars.  DURING THE NEXT 24 HOURS you may add the following to the above:    Hot cereal, plain toast, bread, rolls, crackers    Plain noodles, rice, mashed potatoes, chicken noodle or rice soup    Unsweetened canned fruit (avoid pineapple), bananas    Avoid dairy products    Limit caffeine and chocolate. No spices or seasonings except salt.  DURING THE NEXT 24 HOURS  Slowly go back to a normal diet, as you feel better and your symptoms lessen.  FOLLOW UP with your doctor as advised if you are not improving over the next 2-3 days.  GET PROMPT MEDICAL ATTENTION if any of the following occur:    Constant abdominal pain that stays in the same spot or gets worse    Continued vomiting (unable to keep liquids down) for 24 hours    Frequent diarrhea (more than 5 times a day); blood (red or black color) in diarrhea    No urine output for 12 hours or extreme thirst    Weakness, dizziness or fainting    Unusually drowsy or confused    Fever over 101.0  F (38.3  C) for more than 3 days    Yellow color of the eyes or skin    6128-4931 [employer]. [address] [place] [address] 48038. All rights reserved. This information is not intended as a substitute for professional medical care. Always follow your healthcare professional's instructions.  This information has been modified by your health care provider with permission from the publisher.      24 Hour Appointment Hotline       To make an appointment at any  [facility], call [phone] ([phone]). If you don't have a family doctor or clinic, we will help you find one. [facility] are conveniently located to serve the needs of you and your family.             Review of your medicines      START taking        Dose / Directions Last dose taken    ondansetron 4 MG ODT tab   Commonly known as:  ZOFRAN ODT   Dose:  4 mg   Quantity:  10 tablet        Take 1 tablet (4 mg) by mouth every 8 hours as needed   Refills:  0          Our records show that you are taking the medicines listed below. If these are incorrect, please call your family doctor or clinic.        Dose / Directions Last dose taken    APNO ointment Crea cream   Quantity:  30 g        Apply small layer to each nipple after each feeding and cover with saran wrap.  No need to wipe off before nursing.   Refills:  1        docusate sodium 100 MG tablet   Commonly known as:  COLACE   Dose:  100 mg   Quantity:  30 tablet        Take 100 mg by mouth daily   Refills:  0        prenatal multivitamin plus iron 27-0.8 MG Tabs per tablet   Dose:  1 tablet   Indication:  Pregnancy        Take 1 tablet by mouth daily   Refills:  0        sertraline 50 MG tablet   Commonly known as:  ZOLOFT   Dose:  50 mg   Quantity:  90 tablet        Take 1 tablet (50 mg) by mouth daily   Refills:  3                Prescriptions were sent or printed at these locations (1 Prescription)                   Other Prescriptions                Printed at Department/Unit printer (1 of 1)         ondansetron (ZOFRAN ODT) 4 MG ODT tab                Procedures and tests performed during your visit     CBC with platelets + differential    Comprehensive metabolic panel    INR    Lipase      Orders Needing Specimen Collection     None      Pending Results     No orders found from 12/12/2017 to 12/15/2017.            Pending Culture Results     No orders found from 12/12/2017 to 12/15/2017.            Pending Results Instructions     If  you had any lab results that were not finalized at the time of your Discharge, you can call the ED Lab Result RN at [phone]. You will be contacted by this team for any positive Lab results or changes in treatment. The nurses are available 7 days a week from 10A to 6:30P.  You can leave a message 24 hours per day and they will return your call.        Test Results From Your Hospital Stay        12/14/2017 10:49 PM      Component Results     Component Value Ref Range & Units Status    WBC 7.4 4.0 - 11.0 10e9/L Final    RBC Count 4.68 3.8 - 5.2 10e12/L Final    Hemoglobin 13.4 11.7 - 15.7 g/dL Final    Hematocrit 39.0 35.0 - 47.0 % Final    MCV 83 78 - 100 fl Final    MCH 28.6 26.5 - 33.0 pg Final    MCHC 34.4 31.5 - 36.5 g/dL Final    RDW 13.7 10.0 - 15.0 % Final    Platelet Count 245 150 - 450 10e9/L Final    Diff Method Automated Method  Final    % Neutrophils 90.7 % Final    % Lymphocytes 6.0 % Final    % Monocytes 3.1 % Final    % Eosinophils 0.0 % Final    % Basophils 0.1 % Final    % Immature Granulocytes 0.1 % Final    Nucleated RBCs 0 0 /100 Final    Absolute Neutrophil 6.7 1.6 - 8.3 10e9/L Final    Absolute Lymphocytes 0.4 (L) 0.8 - 5.3 10e9/L Final    Absolute Monocytes 0.2 0.0 - 1.3 10e9/L Final    Absolute Eosinophils 0.0 0.0 - 0.7 10e9/L Final    Absolute Basophils 0.0 0.0 - 0.2 10e9/L Final    Abs Immature Granulocytes 0.0 0 - 0.4 10e9/L Final    Absolute Nucleated RBC 0.0  Final         12/14/2017 11:06 PM      Component Results     Component Value Ref Range & Units Status    Sodium 136 133 - 144 mmol/L Final    Potassium 3.7 3.4 - 5.3 mmol/L Final    Chloride 104 94 - 109 mmol/L Final    Carbon Dioxide 24 20 - 32 mmol/L Final    Anion Gap 8 3 - 14 mmol/L Final    Glucose 118 (H) 70 - 99 mg/dL Final    Urea Nitrogen 14 7 - 30 mg/dL Final    Creatinine 0.71 0.52 - 1.04 mg/dL Final    GFR Estimate >90 >60 mL/min/1.7m2 Final    Non  GFR Calc    GFR Estimate If Black >90 >60  mL/min/1.7m2 Final     GFR Calc    Calcium 7.7 (L) 8.5 - 10.1 mg/dL Final    Bilirubin Total 0.4 0.2 - 1.3 mg/dL Final    Albumin 3.5 3.4 - 5.0 g/dL Final    Protein Total 6.8 6.8 - 8.8 g/dL Final    Alkaline Phosphatase 114 40 - 150 U/L Final    ALT 55 (H) 0 - 50 U/L Final    AST 23 0 - 45 U/L Final         12/14/2017 11:04 PM      Component Results     Component Value Ref Range & Units Status    Lipase 147 73 - 393 U/L Final         12/14/2017 11:01 PM      Component Results     Component Value Ref Range & Units Status    INR 1.05 0.86 - 1.14 Final                Clinical Quality Measure: Blood Pressure Screening     Your blood pressure was checked while you were in the emergency department today. The last reading we obtained was  BP: 126/74 . Please read the guidelines below about what these numbers mean and what you should do about them.  If your systolic blood pressure (the top number) is less than 120 and your diastolic blood pressure (the bottom number) is less than 80, then your blood pressure is normal. There is nothing more that you need to do about it.  If your systolic blood pressure (the top number) is 120-139 or your diastolic blood pressure (the bottom number) is 80-89, your blood pressure may be higher than it should be. You should have your blood pressure rechecked within a year by a primary care provider.  If your systolic blood pressure (the top number) is 140 or greater or your diastolic blood pressure (the bottom number) is 90 or greater, you may have high blood pressure. High blood pressure is treatable, but if left untreated over time it can put you at risk for heart attack, stroke, or kidney failure. You should have your blood pressure rechecked by a primary care provider within the next 4 weeks.  If your provider in the emergency department today gave you specific instructions to follow-up with your doctor or provider even sooner than that, you should follow that instruction  and not wait for up to 4 weeks for your follow-up visit.        Thank you for choosing [facility]       Thank you for choosing [facility] for your care. Our goal is always to provide you with excellent care. Hearing back from our patients is one way we can continue to improve our services. Please take a few minutes to complete the written survey that you may receive in the mail after you visit with us. Thank you!        [facility] Information     [facility] gives you secure access to your electronic health record. If you see a primary care provider, you can also send messages to your care team and make appointments. If you have questions, please call your primary care clinic.  If you do not have a primary care provider, please call [phone] and they will assist you.        Care EveryWhere ID     This is your Care EveryWhere ID. This could be used by other organizations to access your [facility] medical records  [id number]        Equal Access to Services     NAGA VANEGAS : Nilo Kent, venice sylvester, lito chase. So [facility] [phone].    ATENCIÓN: Si habla español, tiene a gay disposición servicios gratuitos de asistencia lingüística. Llame al [phone].    We comply with applicable federal civil rights laws and [place] laws. We do not discriminate on the basis of race, color, national origin, age, disability, sex, sexual orientation, or gender identity.            After Visit Summary       This is your record. Keep this with you and show to your community pharmacist(s) and doctor(s) at your next visit.

## 2017-12-15 NOTE — ED PROVIDER NOTES
"  History     Chief Complaint:  Hematemesis and Headache    HPI   Jasmin Benavidez is a 37 year old female who presents with hematemesis and headache. The patient reports throwing up and having diarrhea at 1100 this morning. She had a second bout of emesis immediately following and noticed that it was bright red and bloody. She felt achey, had difficultly moving, and bouts of nausea without emesis since then. She called the nurses line who instructed her to present to the ED for further evaluation of her bloody vomit. She endorses a current headache. She reports taking Tylenol at 2000 tonight. She reports having fluids today and saltines, but limited oral intake.    Allergies:  No known drug allergies      Medications:    Zoloft     Past Medical History:    Migraine  Depression    Past Surgical History:    [place] tooth removal    Family History:    Hypertension - mother, father  Tumor - mother  Diabetes - maternal uncle  Cancer - paternal grandmother, paternal grandfather, paternal aunt    Social History:  Smoking status: no  Alcohol use: no   PCP: Stephany Noriega   Patient presents with neighbor.   Marital Status:        Review of Systems   Gastrointestinal: Positive for diarrhea, nausea and vomiting. Negative for constipation.        Positive for hematemesis   Musculoskeletal: Positive for arthralgias and myalgias.   Neurological: Positive for headaches.   All other systems reviewed and are negative.    Physical Exam     Patient Vitals for the past 24 hrs:   BP Temp Temp src Heart Rate Resp SpO2 Height Weight   12/14/17 2147 126/74 98  F (36.7  C) Temporal 93 14 97 % 1.702 m (5' 7\") 81.6 kg (180 lb)      Physical Exam  General: Appears well-developed and well-nourished.   Head: No signs of trauma.   Mouth/Throat: Oropharynx is clear and moist.   CV: Normal rate and regular rhythm.    Resp: Effort normal and breath sounds normal. No respiratory distress.   GI: Soft. There is no tenderness or guarding.  " Normal bowel sounds.  No CVA tenderness.  MSK: Normal range of motion. no edema. No Calf tenderness.  Neuro: The patient is alert and oriented.  Speech normal.  GCS 15  Skin: Skin is warm and dry. No rash noted.   Psych: normal mood and affect. behavior is normal.       Emergency Department Course     Laboratory:  CBC: WNL (WBC 7.4, HGB 13.4, )   CMP: Glucose 118 (H), Calcium 7.7. (L), ALT 55 (H), o/w WNL (Creatinine 0.71)  Lipase: 147  INR: 1.05    Interventions:   2238: NS 1L IV Bolus  2238: Zofran 4mg IV     Emergency Department Course:  Past medical records, nursing notes, and vitals reviewed.  2205: I performed an exam of the patient and obtained history, as documented above. GCS 15.   IV inserted and blood drawn.  2314: I rechecked the patient. Findings and plan explained to the Patient. Patient discharged home with instructions regarding supportive care, medications, and reasons to return. The importance of close follow-up was reviewed.      Impression & Plan      Medical Decision Making:  Jasmin Benavidez Is a 37-year-old woman who presents due to vomiting and diarrhea.  This morning she had an episode of vomiting that initially was food contents but then had blood in it.  She also had diarrhea at the same time.  Through the day she has continued to have some nausea but no further vomiting or diarrhea.  She called the nurse line who recommended she come to the ER.  On my evaluation, her exam is benign.  Blood work was obtained that showed a normal hemoglobin and her electrolytes and LFTs were normal as well.  She did feel a good bit better following Zofran and was able to tolerate p.o.  Given she only had a single episode of hematemesis earlier in the morning and is been many hours since and she has normal hemoglobin and vital signs and a benign exam at this time, I feel that she is appropriate for discharge home and outpatient management.  She was recommended supportive care measures and instructed to  return to the ER for any worsening symptoms, or further concerns.    Diagnosis:    ICD-10-CM    1. Non-intractable vomiting with nausea, unspecified vomiting type R11.2        Disposition:  discharged to home    Discharge Medications:  New Prescriptions    ONDANSETRON (ZOFRAN ODT) 4 MG ODT TAB    Take 1 tablet (4 mg) by mouth every 8 hours as needed         Radha Biswas  12/14/2017    EMERGENCY DEPARTMENT  I, Radha Biswas am serving as a scribe at 10:05 PM on 12/14/2017 to document services personally performed by Henok Rios MD based on my observations and the provider's statements to me.        Henok Rios MD  12/19/17 0027

## 2017-12-15 NOTE — DISCHARGE INSTRUCTIONS
"   * VOMITING [7yr-Adult]  Vomiting is a common symptom that may be due to different causes. These include gastroenteritis (\"stomach-flu\"), food poisoning and gastritis. There are other more serious causes of vomiting which may be hard to diagnose early in the illness. Therefore, it is important to watch for the warning signs listed below.  The main danger from repeated vomiting is \"dehydration\". This is due to excess loss of water and minerals from the body. When this occurs, body fluids must be replaced.`  HOME CARE:    If symptoms are severe, rest at home for the next 24 hours.    You may use acetaminophen (Tylenol) 650-1000 mg every 6 hours to control fever, unless another medicine was prescribed. [ NOTE : If you have chronic liver disease, talk with your doctor before using acetaminophen.] (Aspirin should never be used in anyone under 18 years of age who is ill with a fever. It may cause severe liver damage.)    Avoid tobacco and alcohol use, which may worsen your symptoms.    If medicines for vomiting were prescribed, take as directed.  DURING THE FIRST 12-24 HOURS follow the diet below. Try to take frequent small sips even if you vomit occasionally:    FRUIT JUICES: Apple, grape juice, clear fruit drinks, electrolyte replacement and sports drinks.    BEVERAGES: Sport drinks such as Gatorade, soft drinks without caffeine; mineral water (plain or flavored), decaffeinated tea and coffee.    SOUPS: Clear broth, consommé and bouillon    DESSERTS: Plain gelatin (Jell-O), popsicles and fruit juice bars.  DURING THE NEXT 24 HOURS you may add the following to the above:    Hot cereal, plain toast, bread, rolls, crackers    Plain noodles, rice, mashed potatoes, chicken noodle or rice soup    Unsweetened canned fruit (avoid pineapple), bananas    Avoid dairy products    Limit caffeine and chocolate. No spices or seasonings except salt.  DURING THE NEXT 24 HOURS  Slowly go back to a normal diet, as you feel better and " your symptoms lessen.  FOLLOW UP with your doctor as advised if you are not improving over the next 2-3 days.  GET PROMPT MEDICAL ATTENTION if any of the following occur:    Constant abdominal pain that stays in the same spot or gets worse    Continued vomiting (unable to keep liquids down) for 24 hours    Frequent diarrhea (more than 5 times a day); blood (red or black color) in diarrhea    No urine output for 12 hours or extreme thirst    Weakness, dizziness or fainting    Unusually drowsy or confused    Fever over 101.0  F (38.3  C) for more than 3 days    Yellow color of the eyes or skin    7720-7465 [employer]. [address] [place] [address]58. All rights reserved. This information is not intended as a substitute for professional medical care. Always follow your healthcare professional's instructions.  This information has been modified by your health care provider with permission from the publisher.

## 2018-04-12 DIAGNOSIS — F32.1 MAJOR DEPRESSIVE DISORDER, SINGLE EPISODE, MODERATE (H): ICD-10-CM

## 2018-04-12 NOTE — LETTER
My Depression Action Plan  Name: Jasmin Benavidez   Date of Birth 1980  Date: 4/13/2018    My doctor: Stephany Noriega   My clinic: [address] [address] [phone]          GREEN    ZONE   Good Control    What it looks like:     Things are going generally well. You have normal up s and down s. You may even feel depressed from time to time, but bad moods usually last less than a day.   What you need to do:  1. Continue to care for yourself (see self care plan)  2. Check your depression survival kit and update it as needed  3. Follow your physician s recommendations including any medication.  4. Do not stop taking medication unless you consult with your physician first.           YELLOW         ZONE Getting Worse    What it looks like:     Depression is starting to interfere with your life.     It may be hard to get out of bed; you may be starting to isolate yourself from others.    Symptoms of depression are starting to last most all day and this has happened for several days.     You may have suicidal thoughts but they are not constant.   What you need to do:     1. Call your care team, your response to treatment will improve if you keep your care team informed of your progress. Yellow periods are signs an adjustment may need to be made.     2. Continue your self-care, even if you have to fake it!    3. Talk to someone in your support network    4. Open up your depression survival kit           RED    ZONE Medical Alert - Get Help    What it looks like:     Depression is seriously interfering with your life.     You may experience these or other symptoms: You can t get out of bed most days, can t work or engage in other necessary activities, you have trouble taking care of basic hygiene, or basic responsibilities, thoughts of suicide or death that will not go away, self-injurious behavior.     What you need to do:  1. Call your care team  and request a same-day appointment. If they are not available (weekends or after hours) call your local crisis line, emergency room or 911.            Depression Self Care Plan / Survival Kit    Self-Care for Depression  Here s the deal. Your body and mind are really not as separate as most people think.  What you do and think affects how you feel and how you feel influences what you do and think. This means if you do things that people who feel good do, it will help you feel better.  Sometimes this is all it takes.  There is also a place for medication and therapy depending on how severe your depression is, so be sure to consult with your medical provider and/ or Behavioral Health Consultant if your symptoms are worsening or not improving.     In order to better manage my stress, I will:    Exercise  Get some form of exercise, every day. This will help reduce pain and release endorphins, the  feel good  chemicals in your brain. This is almost as good as taking antidepressants!  This is not the same as joining a gym and then never going! (they count on that by the way ) It can be as simple as just going for a walk or doing some gardening, anything that will get you moving.      Hygiene   Maintain good hygiene (Get out of bed in the morning, Make your bed, Brush your teeth, Take a shower, and Get dressed like you were going to work, even if you are unemployed).  If your clothes don't fit try to get ones that do.    Diet  I will strive to eat foods that are good for me, drink plenty of water, and avoid excessive sugar, caffeine, alcohol, and other mood-altering substances.  Some foods that are helpful in depression are: complex carbohydrates, B vitamins, flaxseed, fish or fish oil, fresh fruits and vegetables.    Psychotherapy  I agree to participate in Individual Therapy (if recommended).    Medication  If prescribed medications, I agree to take them.  Missing doses can result in serious side effects.  I understand  that drinking alcohol, or other illicit drug use, may cause potential side effects.  I will not stop my medication abruptly without first discussing it with my provider.    Staying Connected With Others  I will stay in touch with my friends, family members, and my primary care provider/team.    Use your imagination  Be creative.  We all have a creative side; it doesn t matter if it s oil painting, sand castles, or mud pies! This will also kick up the endorphins.    Witness Beauty  (AKA stop and smell the roses) Take a look outside, even in mid-winter. Notice colors, textures. Watch the squirrels and birds.     Service to others  Be of service to others.  There is always someone else in need.  By helping others we can  get out of ourselves  and remember the really important things.  This also provides opportunities for practicing all the other parts of the program.    Humor  Laugh and be silly!  Adjust your TV habits for less news and crime-drama and more comedy.    Control your stress  Try breathing deep, massage therapy, biofeedback, and meditation. Find time to relax each day.     My support system    Clinic Contact:  Phone number:    Contact 1:  Phone number:    Contact 2:  Phone number:    [facility]/:  Phone number:    Therapist:  Phone number:    Local crisis center:    Phone number:    Other community support:  Phone number:

## 2018-04-13 NOTE — TELEPHONE ENCOUNTER
"Requested Prescriptions   Pending Prescriptions Disp Refills     sertraline (ZOLOFT) 50 MG tablet [Pharmacy Med Name: SERTRALINE HCL 50MG TABS] 90 tablet 3     Sig: TAKE ONE TABLET BY MOUTH EVERY DAY    SSRIs Protocol Failed    4/12/2018 11:04 PM       Failed - Recent (6 mo) or future (30 days) visit within the authorizing provider's specialty    Patient had office visit in the last 6 months or has a visit in the next 30 days with authorizing provider or within the authorizing provider's specialty.  See \"Patient Info\" tab in inbasket, or \"Choose Columns\" in Meds & Orders section of the refill encounter.           Passed - PHQ-9 score less than 5 in past 6 months    Please review last PHQ-9 score.          Passed - Patient is age 18 or older       Passed - No active pregnancy on record       Passed - No positive pregnancy test in last 12 months        PHQ-9 SCORE 3/17/2017 9/21/2017 12/12/2017   Total Score - - -   Total Score MyChart 4 (Minimal depression) - -   Total Score - 4 4     Routing refill request to provider for review/approval because:  Patient needs to be seen because it has been more than 1 year since last office visit.  Last office visit with you 2/24/15        "

## 2018-05-17 DIAGNOSIS — F32.1 MAJOR DEPRESSIVE DISORDER, SINGLE EPISODE, MODERATE (H): ICD-10-CM

## 2018-05-17 NOTE — TELEPHONE ENCOUNTER
"Requested Prescriptions   Pending Prescriptions Disp Refills     sertraline (ZOLOFT) 50 MG tablet  Last Written Prescription Date:  4/13/18  Last Fill Quantity: 90,  # refills: 3   Last office visit: No previous visit found with prescribing provider:     Future Office Visit:   90 tablet 3     Sig: Take 1 tablet (50 mg) by mouth daily    SSRIs Protocol Failed    5/17/2018  8:21 AM       Failed - Recent (6 mo) or future (30 days) visit within the authorizing provider's specialty    Patient had office visit in the last 6 months or has a visit in the next 30 days with authorizing provider or within the authorizing provider's specialty.  See \"Patient Info\" tab in inbasket, or \"Choose Columns\" in Meds & Orders section of the refill encounter.       PHQ-9 SCORE 3/17/2017 9/21/2017 12/12/2017   Total Score - - -   Total Score MyChart 4 (Minimal depression) - -   Total Score - 4 4     RAVEN-7 SCORE 11/4/2013 12/4/2013   Total Score 11 7           Passed - PHQ-9 score less than 5 in past 6 months    Please review last PHQ-9 score.          Passed - Patient is age 18 or older       Passed - No active pregnancy on record       Passed - No positive pregnancy test in last 12 months        "

## 2018-05-22 NOTE — TELEPHONE ENCOUNTER
Patient needs to be seen by provider.  Last saw FP provider 1/29/16 PAULO Jefferson at [facility]  Last saw Dr Noriega 2/24/15  Routing to provider and scheduling  1 month pended.  Esperanza Cortes RN

## 2018-07-17 DIAGNOSIS — F32.1 MAJOR DEPRESSIVE DISORDER, SINGLE EPISODE, MODERATE (H): ICD-10-CM

## 2018-07-17 NOTE — TELEPHONE ENCOUNTER
"Requested Prescriptions   Pending Prescriptions Disp Refills     sertraline (ZOLOFT) 50 MG tablet [Pharmacy Med Name: SERTRALINE HCL 50MG TABLET]  Last Written Prescription Date:  5/24/18  Last Fill Quantity: 90 tablet,  # refills: 0   Last Office Visit: No previous visit found   Future Office Visit:      90 tablet      Sig: TAKE 1 TABLET BY MOUTH  DAILY PLEASE SCHEDULE  OFFICE APPOINTMENT, THANKS! -VANESSA    SSRIs Protocol Failed    7/17/2018  6:00 AM       Failed - PHQ-9 score less than 5 in past 6 months    Please review last PHQ-9 score.   PHQ-9 SCORE 3/17/2017 9/21/2017 12/12/2017   Total Score - - -   Total Score MyChart 4 (Minimal depression) - -   Total Score - 4 4     RAVEN-7 SCORE 11/4/2013 12/4/2013   Total Score 11 7            Failed - Recent (6 mo) or future (30 days) visit within the authorizing provider's specialty    Patient had office visit in the last 6 months or has a visit in the next 30 days with authorizing provider or within the authorizing provider's specialty.  See \"Patient Info\" tab in inbasket, or \"Choose Columns\" in Meds & Orders section of the refill encounter.           Passed - Patient is age 18 or older       Passed - No active pregnancy on record       Passed - No positive pregnancy test in last 12 months          "

## 2018-07-20 NOTE — TELEPHONE ENCOUNTER
Routing refill request to provider for review/approval because:  Rhiannon given x1 and patient did not follow up, please advise

## 2018-09-21 ENCOUNTER — OFFICE VISIT (OUTPATIENT)
Dept: FAMILY MEDICINE | Facility: CLINIC | Age: 38
End: 2018-09-21
Payer: COMMERCIAL

## 2018-09-21 VITALS
SYSTOLIC BLOOD PRESSURE: 117 MMHG | TEMPERATURE: 97.6 F | WEIGHT: 176 LBS | OXYGEN SATURATION: 96 % | DIASTOLIC BLOOD PRESSURE: 76 MMHG | BODY MASS INDEX: 27.57 KG/M2 | RESPIRATION RATE: 16 BRPM | HEART RATE: 78 BPM

## 2018-09-21 DIAGNOSIS — Z23 NEED FOR PROPHYLACTIC VACCINATION AND INOCULATION AGAINST INFLUENZA: ICD-10-CM

## 2018-09-21 DIAGNOSIS — F32.1 MAJOR DEPRESSIVE DISORDER, SINGLE EPISODE, MODERATE (H): ICD-10-CM

## 2018-09-21 DIAGNOSIS — Z97.5 PRESENCE OF INTRAUTERINE CONTRACEPTIVE DEVICE: Primary | ICD-10-CM

## 2018-09-21 PROBLEM — Z34.83 ENCOUNTER FOR SUPERVISION OF OTHER NORMAL PREGNANCY IN THIRD TRIMESTER: Status: RESOLVED | Noted: 2017-09-06 | Resolved: 2018-09-21

## 2018-09-21 PROBLEM — Z33.1 PREGNANCY, INCIDENTAL: Status: RESOLVED | Noted: 2017-03-17 | Resolved: 2018-09-21

## 2018-09-21 PROCEDURE — 90686 IIV4 VACC NO PRSV 0.5 ML IM: CPT | Performed by: FAMILY MEDICINE

## 2018-09-21 PROCEDURE — 90471 IMMUNIZATION ADMIN: CPT | Performed by: FAMILY MEDICINE

## 2018-09-21 PROCEDURE — 99213 OFFICE O/P EST LOW 20 MIN: CPT | Mod: 25 | Performed by: FAMILY MEDICINE

## 2018-09-21 ASSESSMENT — PATIENT HEALTH QUESTIONNAIRE - PHQ9: 5. POOR APPETITE OR OVEREATING: NOT AT ALL

## 2018-09-21 ASSESSMENT — ANXIETY QUESTIONNAIRES
7. FEELING AFRAID AS IF SOMETHING AWFUL MIGHT HAPPEN: NOT AT ALL
5. BEING SO RESTLESS THAT IT IS HARD TO SIT STILL: NOT AT ALL
2. NOT BEING ABLE TO STOP OR CONTROL WORRYING: NOT AT ALL
IF YOU CHECKED OFF ANY PROBLEMS ON THIS QUESTIONNAIRE, HOW DIFFICULT HAVE THESE PROBLEMS MADE IT FOR YOU TO DO YOUR WORK, TAKE CARE OF THINGS AT HOME, OR GET ALONG WITH OTHER PEOPLE: NOT DIFFICULT AT ALL
GAD7 TOTAL SCORE: 1
3. WORRYING TOO MUCH ABOUT DIFFERENT THINGS: SEVERAL DAYS
6. BECOMING EASILY ANNOYED OR IRRITABLE: NOT AT ALL
1. FEELING NERVOUS, ANXIOUS, OR ON EDGE: NOT AT ALL

## 2018-09-21 NOTE — MR AVS SNAPSHOT
After Visit Summary   9/21/2018    Jasmin Benavidez    MRN: [id number]           Patient Information     Date Of Birth          1980        Visit Information        Provider Department      9/21/2018 8:20 AM Stephany Noriega MD [facility]        Today's Diagnoses     Major depressive disorder, single episode, moderate (H)          Care Instructions    Health Maintenance Due   Topic Date Due     PHQ-9 Q6 MONTHS  06/12/2018     INFLUENZA VACCINE (1) 09/01/2018              Follow-ups after your visit        Who to contact     If you have questions or need follow up information about today's clinic visit or your schedule please contact [facility] directly at [phone].  Normal or non-critical lab and imaging results will be communicated to you by MyChart, letter or phone within 4 business days after the clinic has received the results. If you do not hear from us within 7 days, please contact the clinic through [employer] or phone. If you have a critical or abnormal lab result, we will notify you by phone as soon as possible.  Submit refill requests through [employer] or call your pharmacy and they will forward the refill request to us. Please allow 3 business days for your refill to be completed.          Additional Information About Your Visit        MyChart Information     [employer] gives you secure access to your electronic health record. If you see a primary care provider, you can also send messages to your care team and make appointments. If you have questions, please call your primary care clinic.  If you do not have a primary care provider, please call [phone] and they will assist you.        Care EveryWhere ID     This is your Care EveryWhere ID. This could be used by other organizations to access your [facility] medical records  [id number]        Your Vitals Were     Pulse Temperature Respirations Pulse Oximetry BMI (Body Mass Index)       78 97.6  F (36.4  C)  (Tympanic) 16 96% 27.57 kg/m2        Blood Pressure from Last 3 Encounters:   09/21/18 117/76   12/14/17 123/77   12/12/17 117/79    Weight from Last 3 Encounters:   09/21/18 176 lb (79.8 kg)   12/14/17 180 lb (81.6 kg)   12/12/17 177 lb (80.3 kg)              Today, you had the following     No orders found for display         Today's Medication Changes          These changes are accurate as of 9/21/18  8:46 AM.  If you have any questions, ask your nurse or doctor.               These medicines have changed or have updated prescriptions.        Dose/Directions    sertraline 50 MG tablet   Commonly known as:  ZOLOFT   This may have changed:  See the new instructions.   Used for:  Major depressive disorder, single episode, moderate (H)   Changed by:  Stephany Noriega MD        TAKE 1 TABLET BY MOUTH  DAILY   Quantity:  90 tablet   Refills:  3            Where to get your medicines      These medications were sent to [facility] - [address], [facility] [address]     Phone:  [phone]     sertraline 50 MG tablet                Primary Care Provider Office Phone # Fax #    Stephany Noriega [phone] [address] [facility] [address]        Equal Access to Services     ROSSY VANEGAS AH: Hadii aad ku hadasho Soomaali, waaxda luqadaha, qaybta kaalmada adeegyada, waxay lore haygage henriquez. So [facility] [phone].    ATENCIÓN: Si habla español, tiene a gay disposición servicios gratuitos de asistencia lingüística. Llame al [phone].    We comply with applicable federal civil rights laws and [place] laws. We do not discriminate on the basis of race, color, national origin, age, disability, sex, sexual orientation, or gender identity.            Thank you!     Thank you for choosing [facility]  for your care. Our goal is always to provide you with excellent care. Hearing back from our  patients is one way we can continue to improve our services. Please take a few minutes to complete the written survey that you may receive in the mail after your visit with us. Thank you!             Your Updated Medication List - Protect others around you: Learn how to safely use, store and throw away your medicines at www.disposemymeds.org.          This list is accurate as of 18  8:46 AM.  Always use your most recent med list.                   Brand Name Dispense Instructions for use Diagnosis    APNO Crea ointment     30 g    Apply small layer to each nipple after each feeding and cover with saran wrap.  No need to wipe off before nursing.    Sore nipples due to lactation       docusate sodium 100 MG tablet    COLACE    30 tablet    Take 100 mg by mouth daily     (normal spontaneous vaginal delivery)       ondansetron 4 MG ODT tab    ZOFRAN ODT    10 tablet    Take 1 tablet (4 mg) by mouth every 8 hours as needed        prenatal multivitamin plus iron 27-0.8 MG Tabs per tablet      Take 1 tablet by mouth daily        sertraline 50 MG tablet    ZOLOFT    90 tablet    TAKE 1 TABLET BY MOUTH  DAILY    Major depressive disorder, single episode, moderate (H)

## 2018-09-21 NOTE — PROGRESS NOTES

## 2018-09-21 NOTE — PROGRESS NOTES
SUBJECTIVE:   Jasmin Benavidez is a 38 year old female who presents to clinic today for the following health issues:    Depression Followup    Status since last visit: Stable     She stopped antidepressants for about one week and noticed more irritability    She would like to stop medication again in the spring, plans to start regular exercise routine over the winter    She doesn't get enough sleep; likes having time for herself after children are in bed    See PHQ-9 for current symptoms.  Other associated symptoms: None    Complicating factors:   Significant life event:  Yes-  Birth of 2 children, 11 month old and almost 3 year old now, and death of mother   Current substance abuse:  None  Anxiety or Panic symptoms:  No    PHQ-9 9/21/2017 12/12/2017 9/21/2018   Total Score 4 4 3   Q9: Suicide Ideation Not at all Not at all Not at all       PHQ-9  English  PHQ-9   Any Language  Suicide Assessment Five-step Evaluation and Treatment (SAFE-T)    Amount of exercise or physical activity: 2-3 days/week for an average of 30-45 minutes    Problems taking medications regularly: No    Medication side effects: none    Diet: regular (no restrictions)    IUD follow-up      Had a menstrual cup get stuck on cervix and pull on it while trying to remove.    Wants to confirm it is still in place correctly     Problem list and histories reviewed & adjusted, as indicated.  Additional history: as documented    Patient Active Problem List   Diagnosis     Moderate major depression (HCC)     Blood type O+     Presence of intrauterine contraceptive device     Past Surgical History:   Procedure Laterality Date     C ORAL SURGERY PROCEDURE  1999    wisdom teeth removal       Social History   Substance Use Topics     Smoking status: Never Smoker     Smokeless tobacco: Never Used     Alcohol use No     Family History   Problem Relation Age of Onset     Hypertension Mother      Tumor Mother      in stomach     Hypertension Father      Allergies  Father      has seasonal allergies     Diabetes Maternal Uncle      Breast Cancer Paternal Grandmother      early 60's     Cancer Paternal Grandfather      lung ca     Cancer - colorectal Other      paternal aunt, dx at 54     Cancer Other      cervical cancer-treated in 2006         Allergies   Allergen Reactions     No Known Drug Allergies      BP Readings from Last 3 Encounters:   09/21/18 117/76   12/14/17 123/77   12/12/17 117/79    Wt Readings from Last 3 Encounters:   09/21/18 176 lb (79.8 kg)   12/14/17 180 lb (81.6 kg)   12/12/17 177 lb (80.3 kg)                    Reviewed and updated as needed this visit by clinical staff  Tobacco  Allergies  Meds  Problems  Med Hx  Surg Hx  Fam Hx  Soc Hx        Reviewed and updated as needed this visit by Provider  Problems         ROS:  Constitutional, HEENT, cardiovascular, pulmonary, GI, , musculoskeletal, neuro, skin, endocrine and psych systems are negative, except as otherwise noted.    OBJECTIVE:     /76 (BP Location: Left arm, Patient Position: Chair, Cuff Size: Adult Regular)  Pulse 78  Temp 97.6  F (36.4  C) (Tympanic)  Resp 16  Wt 176 lb (79.8 kg)  SpO2 96%  BMI 27.57 kg/m2  Body mass index is 27.57 kg/(m^2).  GENERAL: healthy, alert and no distress  RESP: lungs clear to auscultation - no rales, rhonchi or wheezes  CV: regular rate and rhythm, normal S1 S2, no S3 or S4, no murmur, click or rub, no peripheral edema and peripheral pulses strong   (female): normal female external genitalia, normal urethral meatus , vaginal mucosa pink, moist, well rugated, normal cervix, adnexae, and uterus without masses. and purple IUD strings visible protruding from cervical os about 1 cm, IUD not visible  MS: no gross musculoskeletal defects noted, no edema  PSYCH: mentation appears normal, affect normal/bright    Diagnostic Test Results:  none     ASSESSMENT/PLAN:     1. Major depressive disorder, single episode, moderate (H)  PHQ-9 SCORE 9/21/2017  12/12/2017 9/21/2018   Total Score - - -   Total Score MyChart - - -   Total Score 4 4 3     The current medical regimen is effective;  continue present plan and medications.   Consider weaning in spring  - sertraline (ZOLOFT) 50 MG tablet; TAKE 1 TABLET BY MOUTH  DAILY  Dispense: 90 tablet; Refill: 3    2. Presence of intrauterine contraceptive device  Noted to be in correct location, reassurance      Stephany Noriega MD  Formerly named [facility]

## 2018-09-21 NOTE — PATIENT INSTRUCTIONS
Health Maintenance Due   Topic Date Due     PHQ-9 Q6 MONTHS  06/12/2018     INFLUENZA VACCINE (1) 09/01/2018

## 2018-09-22 ASSESSMENT — ANXIETY QUESTIONNAIRES: GAD7 TOTAL SCORE: 1

## 2018-09-22 ASSESSMENT — PATIENT HEALTH QUESTIONNAIRE - PHQ9: SUM OF ALL RESPONSES TO PHQ QUESTIONS 1-9: 3

## 2019-07-12 ENCOUNTER — OFFICE VISIT (OUTPATIENT)
Dept: FAMILY MEDICINE | Facility: CLINIC | Age: 39
End: 2019-07-12
Payer: COMMERCIAL

## 2019-07-12 VITALS
DIASTOLIC BLOOD PRESSURE: 81 MMHG | OXYGEN SATURATION: 98 % | TEMPERATURE: 97.3 F | SYSTOLIC BLOOD PRESSURE: 120 MMHG | BODY MASS INDEX: 24.59 KG/M2 | WEIGHT: 157 LBS | HEART RATE: 52 BPM | RESPIRATION RATE: 14 BRPM

## 2019-07-12 DIAGNOSIS — F32.1 MAJOR DEPRESSIVE DISORDER, SINGLE EPISODE, MODERATE (H): ICD-10-CM

## 2019-07-12 PROCEDURE — 99213 OFFICE O/P EST LOW 20 MIN: CPT | Performed by: FAMILY MEDICINE

## 2019-07-12 ASSESSMENT — PATIENT HEALTH QUESTIONNAIRE - PHQ9
SUM OF ALL RESPONSES TO PHQ QUESTIONS 1-9: 7
SUM OF ALL RESPONSES TO PHQ QUESTIONS 1-9: 7

## 2019-07-12 ASSESSMENT — ANXIETY QUESTIONNAIRES
GAD7 TOTAL SCORE: 7
2. NOT BEING ABLE TO STOP OR CONTROL WORRYING: NOT AT ALL
7. FEELING AFRAID AS IF SOMETHING AWFUL MIGHT HAPPEN: NOT AT ALL
GAD7 TOTAL SCORE: 7
5. BEING SO RESTLESS THAT IT IS HARD TO SIT STILL: NOT AT ALL
7. FEELING AFRAID AS IF SOMETHING AWFUL MIGHT HAPPEN: NOT AT ALL
6. BECOMING EASILY ANNOYED OR IRRITABLE: NEARLY EVERY DAY
3. WORRYING TOO MUCH ABOUT DIFFERENT THINGS: NOT AT ALL
4. TROUBLE RELAXING: MORE THAN HALF THE DAYS
1. FEELING NERVOUS, ANXIOUS, OR ON EDGE: MORE THAN HALF THE DAYS
GAD7 TOTAL SCORE: 7

## 2019-07-12 NOTE — PROGRESS NOTES
"Subjective     Jasmin Benavidez is a 38 year old female who presents to clinic today for the following health issues:    HPI   Depression Followup    How are you doing with your depression since your last visit? Worsened     She tried to taper zoloft; 4 weeks ago took 1/2 dose, 25 mg for a few days, and then stopped completely    6/30/2019 started feeling really angry \"pissed off\", is more edgy    symptoms aggravated by insufficient sleep; usually gets 6 hours. If she gets 8 hours she feels great.    She's been having more episodes of waking during the night and worrying too much to fall back asleep    Are you having other symptoms that might be associated with depression? No    Have you had a significant life event?  No     Are you feeling anxious or having panic attacks?   No    Do you have any concerns with your use of alcohol or other drugs? No    Social History     Tobacco Use     Smoking status: Never Smoker     Smokeless tobacco: Never Used   Substance Use Topics     Alcohol use: No     Drug use: No     PHQ 12/12/2017 9/21/2018 7/12/2019   PHQ-9 Total Score 4 3 7   Q9: Thoughts of better off dead/self-harm past 2 weeks Not at all Not at all Not at all     RAVEN-7 SCORE 12/4/2013 9/21/2018 7/12/2019   Total Score 7 - -   Total Score - - 7 (mild anxiety)   Total Score - 1 7           Suicide Assessment Five-step Evaluation and Treatment (SAFE-T)    Amount of exercise or physical activity: 6-7 days/week for an average of 30-45 minutes    Problems taking medications regularly: No    Medication side effects: none    Diet: regular (no restrictions)    Patient Active Problem List   Diagnosis     Moderate major depression (HCC)     Blood type O+     Presence of intrauterine contraceptive device     Past Surgical History:   Procedure Laterality Date     C ORAL SURGERY PROCEDURE  1999    wisdom teeth removal       Social History     Tobacco Use     Smoking status: Never Smoker     Smokeless tobacco: Never Used   Substance Use " Topics     Alcohol use: No     Family History   Problem Relation Age of Onset     Hypertension Mother      Tumor Mother         in stomach     Hypertension Father      Allergies Father         has seasonal allergies     Diabetes Maternal Uncle      Breast Cancer Paternal Grandmother         early 60's     Cancer Paternal Grandfather         lung ca     Cancer - colorectal Other         paternal aunt, dx at 54     Cancer Other         cervical cancer-treated in 2006         Allergies   Allergen Reactions     No Known Drug Allergies      Reviewed and updated as needed this visit by Provider         Review of Systems   ROS COMP: Constitutional, HEENT, cardiovascular, pulmonary, gi and gu systems are negative, except as otherwise noted.      Objective    /81 (BP Location: Right arm, Patient Position: Sitting, Cuff Size: Adult Regular)   Pulse 52   Temp 97.3  F (36.3  C) (Tympanic)   Resp 14   Wt 71.2 kg (157 lb)   SpO2 98%   Breastfeeding? No   BMI 24.59 kg/m    Body mass index is 24.59 kg/m .  Physical Exam   GENERAL: healthy, alert and no distress  RESP: no respiratory distress  MS: no gross musculoskeletal defects noted, no edema  PSYCH: mentation appears normal, affect normal/bright, although occasionally slightly tearful, judgement and insight intact and appearance well groomed    Diagnostic Test Results:  none         Assessment & Plan     1. Major depressive disorder, single episode, moderate (H)  I do recommend ongoing serotonin specific reuptake inhibitor given current symptoms , see orders  Please see patient instructions below.   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 (Author), Wanda Bradley (Author), Mar Yang (Author)      Try to get a teensy bit more sleep.  Any help with housework, childcare, etc that's reliable and scheduled can help.        Return in about 3 months (around 10/12/2019) for depression.; sooner if symptoms worsen    Stephany Noriega MD  [facility]        Answers for HPI/ROS submitted by the patient on 7/12/2019   PHQ9 TOTAL SCORE: 7  RAVEN 7 TOTAL SCORE: 7

## 2019-07-12 NOTE — PATIENT INSTRUCTIONS
Positive Discipline for Preschoolers: For Their Early Years--Raising Children Who are Responsible, Respectful, and Resourceful    by Lula Javier Ed.D. (Author), Wanda Bradley (Author), Mar Yang (Author)      Try to get a teensy bit more sleep.  Any help with housework, childcare, etc that's reliable and scheduled can help.

## 2019-07-13 ASSESSMENT — PATIENT HEALTH QUESTIONNAIRE - PHQ9: SUM OF ALL RESPONSES TO PHQ QUESTIONS 1-9: 7

## 2019-07-13 ASSESSMENT — ANXIETY QUESTIONNAIRES: GAD7 TOTAL SCORE: 7

## 2019-09-04 ENCOUNTER — MYC MEDICAL ADVICE (OUTPATIENT)
Dept: FAMILY MEDICINE | Facility: CLINIC | Age: 39
End: 2019-09-04

## 2019-09-04 DIAGNOSIS — M72.2 PLANTAR FASCIITIS: ICD-10-CM

## 2019-09-04 DIAGNOSIS — M25.571 RIGHT ANKLE PAIN, UNSPECIFIED CHRONICITY: Primary | ICD-10-CM

## 2019-09-05 NOTE — TELEPHONE ENCOUNTER
Dr. Noriega-Please review and advise: Would podiatry also be able to address ankle pain?    Podiatry and orthopedic referrals pended.    Thank you!  DAQUAN SantiagoN, RN

## 2019-09-12 ENCOUNTER — OFFICE VISIT (OUTPATIENT)
Dept: PODIATRY | Facility: CLINIC | Age: 39
End: 2019-09-12
Payer: COMMERCIAL

## 2019-09-12 VITALS
SYSTOLIC BLOOD PRESSURE: 122 MMHG | DIASTOLIC BLOOD PRESSURE: 80 MMHG | WEIGHT: 155 LBS | HEIGHT: 66 IN | BODY MASS INDEX: 24.91 KG/M2

## 2019-09-12 DIAGNOSIS — M72.2 PLANTAR FASCIITIS, LEFT: Primary | ICD-10-CM

## 2019-09-12 DIAGNOSIS — M76.821 POSTERIOR TIBIAL TENDINITIS OF RIGHT LOWER EXTREMITY: ICD-10-CM

## 2019-09-12 DIAGNOSIS — M21.42 FLAT FEET: ICD-10-CM

## 2019-09-12 DIAGNOSIS — M21.41 FLAT FEET: ICD-10-CM

## 2019-09-12 PROCEDURE — 99203 OFFICE O/P NEW LOW 30 MIN: CPT | Performed by: PODIATRIST

## 2019-09-12 ASSESSMENT — MIFFLIN-ST. JEOR: SCORE: 1404.83

## 2019-09-12 NOTE — PATIENT INSTRUCTIONS
Thank you for choosing [facility]!    DR. ANGEL'S CLINIC LOCATIONS     MONDAY - [place] WEDNESDAY (AM ONLY) - [place]   [address] [place] [address] [address] [id number]   [phone] / [phone] / [phone]       THURSDAY - [place] SCHEDULE SURGERY: [phone]   [address] APPOINTMENTS: [phone]   [place] [address] BILLING QUESTIONS: [phone] / [phone]         PLANTAR FASCIITIS  Plantar fasciitis is often referred to as heel spurs or heel pain. Plantar fasciitis is a very common problem that affects people of all foot shapes, age, weight and activity level. Pain may be in the arch or on the weight-bearing surface of the heel. The pain may come on without injury or identifiable cause. Pain is generally present when first getting out of bed in the morning or up from a seated break.     CAUSES  The plantar fascia is a dense fibrous band of tissue that stretches across the bottom surface of the foot. The fascia helps support the foot muscles and arch. Plantar fasciitis is thought to be caused by mechanical strain or overload. Frequent walking without shoes or wearing unsupportive shoes is thought to cause structural overload and ultimately inflammation of the plantar fascia. Some people have heel spurs that can be seen on x-ray. The heel spur is actually a minor component of plantar fascitis and is largely ignored.       SELF TREATMENT   The easiest solution is to stop walking around your home without shoes. Plantar fasciitis is largely a shoe problem. Shoes are either not being worn often enough or your current shoes are inadequate for your weight, foot structure or activity level. The majority of shoes on the market today are not sufficient to resist development of plantar fasciitis or to promote healing. Assume that your current shoes are inadequate and will need to be replaced. Even  high quality shoes wear out with 6 months to one year of frequent use. Weight loss is another option. Losing ten pounds in the next two months may be enough to resolve the problem. Ice applied to the area of pain two to three times per day for ten minutes each session can be very helpful. Warm foot soaks in epsom salts can also relieve pain. This should continue until the problem resolves. Achilles tendon stretching is essential. Stretch multiple times daily to promote healing and to prevent recurrence in the future. Over all stretching of the body is helpful as well such as the calves, thighs and lower back. Normally when one area of the body is tight, other areas are too. Gentle Yoga can be good for this.     Over the counter topical anti inflammatories can be helpful such as biofreeze, bengay, salon pas, ect...  Oral ibuprofen or aleve is recommended as well to try to calm down inflammation.     Night splints can be helpful to gradually stretch the foot at night as a lot of pain is when you get up in the morning. Taking a towel or thera band and stretching the foot back multiple times before you get ou of bed can be beneficial as well.     MEDICAL TREATMENT  Medical treatments often include custom arch supports, cortisone injections, physical therapy, splints to be worn in bed, prescription medications and surgery. The home treatments listed above will be necessary regardless of these advanced medical treatments. Surgery is rarely needed but is very helpful in selected cases.     PROGNOSIS  Plantar fasciitis can last from one day to a lifetime. Some people get intermittent fascitis that is very short-lived. Others suffer daily for years. Excessive body weight, frequent bare foot walking, long hours on the feet, inadequate shoes, predisposing foot structures and excessive activity such as running are all potential issues that lead to chronic and/or recurring plantar fascitis. Having plantar fasciitis means that  you are forever prone to this problem and will require modification of some of the above factors. Most people seek treatment within one to four months. Healing usually requires a similar one to four month time frame. Healing time is relative to the amount of effort spent treating the problem.   Plantar fasciitis is highly recurrent. Risk factors often continue, including return to bare foot walking, inadequate shoes, excessive body weight, excessive activities, etc. Your life style and foot structure may predispose you to recurrent plantar fasciitis. A daily prevention regimen can be very helpful. Ongoing use of shoe inserts, careful attention to appropriate shoes, daily Achilles stretching, etc. may prevent recurrence. Prompt attention at the earliest warning signs of heel pain can resolve the problem in as short as a few days.     EXERCISES  Stair Exercise: Step on the stairs with the ball of your foot and hold your position for at least 15 seconds, then slowly step down with the heels of your foot. You can do this daily and as often as you want.   Picking the Towel: Sit comfortably and then pick the towel up with your toes. You can use any object other than a towel as long as the material can be soft and you can pick it up with your toes.  Rolling the Bottle: Use a small ball or frozen water bottle and then roll it around with your foot.   Flex the Toes: Sit comfortably and then flex your toes by pointing it towards the floor or towards your body. This will relax and flex your foot and exercise your plantar fascia, the calf, and the Achilles tendon. The inability of the foot to stretch often causes the bunching up of the plantar fascia area leading to the pain.  Calf/Achilles Stretching: Lay on you back and raise one foot, then point your toes towards the floor. See photo below:               Hold each stretch for 10 seconds. Stretch 10 times per set, three sets per day. Morning, afternoon and evening. If your  heel pain is very severe in the morning, consider doing the first set of stretches before you get out of bed.      OVER THE COUNTER INSERT RECOMMENDATIONS  SuperFeet   Sofsole Fit Spenco   Power Step   Walk-Fit Arch Cradles     Most of these can be found at your local [employer], [employer], or online.  **A good high quality over the counter insert should cost around $40-$50      [facility] LOCATIONS  [place]  [address]  [phone]   [address]  [phone] [place]  [address]  [phone]   [place]  [address]  [phone]   [place]  [address]  [phone] [place] [address]  [phone]   [place]  [address]  [phone]   [place]  [address] [place] [address]  [phone] [place]  [address]  [phone]       [place] [facility] LOCATIONS  [facility]  [address]  [place] [address]  Phone: [phone]  Fax: [phone] [facility]  [address]  [place] [address]  Phone: [phone]   Fax: [phone]   [facility]  [address]  [place] [address]  Phone: [phone]  Fax: [phone] [facility]  [address]  [place] [address]  Phone: [phone]   Fax: [phone]   [facility]   [address]  [place] [address]  Phone: [phone]  Fax: [phone] * Please call any location listed to make an appointment for a casting/fitting. Your referral was sent to their central office and they will all have the order on file.     WEARING YOUR CUSTOM FOOT ORTHOTICS   Most insurance plans cover one pair of orthotics per year. You must check with your   insurance plan to see what your payment responsibility will be. Please call your   insurance company by calling the number on the back of your  insurance card.   Orthotic's are non-refundable and non-returnable.   Orthotics are made of various designs. Some orthotics are covered with material that extends beyond your toes. If your orthotic is of this design, you will likely need to trim the toe end to get a proper fit. The insole from your shoe can be used as a template. Simply overlay the shoe insert on top of the custom orthotic. Align the heel end while tracing the length of the insert onto the custom orthotic. Use a large scissor to trim the toe end until you get a proper fit in the shoe.   The orthotic needs to be pushed as far back in the shoe as possible. The heel portion should not ride forward so as not to irritate your heel.   Orthotics are designed to work with socks. Excessive perspiration will shorten the life span of the orthotics. Remove the orthotic from the shoe frequently for proper drying.   The break-in period lasts for weeks. People new to orthotics will likely experience new aches and pains. The orthotic is forcing your foot into a new position. Arch, foot and leg muscle aches and fatigue are common during these weeks. Minor discomfort can be considered normal break in phenomenon. Start wearing your orthotic around your home your first day. Limited activity for one to two hours is recommended. You can increase one or two additional hours each day provided the aches and pains are subsiding. The degree of discomfort, fatigue and problems will dictate the speed of break in. You may require multiple weeks to work up to full time use.   Do not continue wearing your orthotics if they are creating problems such as blisters or sores. Do not hesitate to call the clinic to speak with a nurse regarding orthotic   break in, fit, trimming, etc. You may also need to see the doctor if the orthotics are   simply not working out. Adjustments are sometimes made to improve orthotic   function.     Orthotics will only work in certain styles and types of  shoes. Orthotics rarely work in dress shoes. Slip-ons, clogs, sandals and heels are particularly troublesome. Specially designed orthotics may be necessary for these types of shoes. Your custom orthotic was designed for activities that require appropriate walking or running shoes. Lace up athletic shoes, walking shoes or work boots should work appropriately. You may need a wider or longer shoe. Shoes with a removable  or insert work best. In general, you want to remove an insert from the shoe before placing the orthotic into the shoe. Shoes without a removable liner may not work as well.     When purchasing new shoes, bring your orthotics along to get a proper fit. Shop at stores that are familiar with orthotics.   Frequent washing of the orthotic may shorten the life span of the top cover. The top cover can be replaced but will generally last one to five years depending on use and foot perspiration.       POSTERIOR TIBIAL TENDON DYSFUNCTION (PTTD)  The posterior tibial tendon serves as one of the major supporting structures of the foot, helping it to function while walking. Posterior tibial tendon dysfunction (PTTD) is a condition caused by changes in the tendon, impairing its ability to support the arch. This results in flattening of the foot.  PTTD is often called adult acquired flatfoot because it is the most common type of flatfoot developed during adulthood. Although this condition typically occurs in only one foot, some people may develop it in both feet. PTTD is usually progressive, which means it will keep getting worse, especially if it is not treated early.  CAUSES  Overuse of the posterior tibial tendon is often the cause of PTTD. In fact, the symptoms usually occur after activities that involve the tendon, such as running, walking, hiking or climbing stairs.  SYMPTOMS   The symptoms of PTTD may include pain, swelling, a flattening of the arch and an inward rolling of the ankle. As the  condition progresses, the symptoms will change.  For example, when PTTD initially develops, there is pain on the inside of the foot and ankle (along the course of the tendon). In addition, the area may be red, warm and swollen.  Later, as the arch begins to flatten, there may still be pain on the inside of the foot and ankle. But at this point, the foot and toes begin to turn outward and the ankle rolls inward.  As PTTD becomes more advanced, the arch flattens even more and the pain often shifts to the outside of the foot, below the ankle. The tendon has deteriorated considerably, and arthritis often develops in the foot. In more severe cases, arthritis may also develop in the ankle.  TREATMENT  Because of the progressive nature of PTTD, early treatment is advised. If treated early enough, your symptoms may resolve without the need for surgery, and progression of your condition can be arrested.  In contrast, untreated PTTD could leave you with an extremely flat foot, painful arthritis in the foot and ankle and increasing limitations on walking, running or other activities.  In many cases of PTTD, treatment can begin with nonsurgical approaches that may include:  Orthotic devices or bracing - To give your arch the support it needs, your foot and ankle surgeon may provide you with an ankle brace or a custom orthotic device that fits into the shoe.   Immobilization - Sometimes a short-leg cast or boot is worn to immobilize the foot and allow the tendon to heal, or you may need to completely avoid all weightbearing for a while.   Physical therapy - Ultrasound therapy and exercises may help rehabilitate the tendon and muscle following immobilization.   Medications - Nonsteroidal anti-inflammatory drugs (NSAIDs), such as ibuprofen, help reduce the pain and inflammation.   Shoe modifications - Your foot and ankle surgeon may advise changes to your shoes and may provide special inserts designed to improve arch  support.  SURGERY  In cases of PTTD that have progressed substantially or have failed to improve with nonsurgical treatment, surgery may be required. For some advanced cases, surgery may be the only option. Your foot and ankle surgeon will determine the best approach for you.        FYI: BODY WEIGHT AND YOUR FEET  The following information is included in the after visit summary for all patients. Body weight can be a sensitive issue to discuss in clinic, but we think the following information is very important. Although we focus on the feet and ankles, we do support the overall health of our patients.     Many things can cause foot and ankle problems. Foot structure, activity level, foot mechanics and injuries are common causes of pain. One very important issue that often goes unmentioned, is body weight. Extra weight can cause increased stress on muscles, ligaments, bones and tendons. Sometimes just a few extra pounds is all it takes to put one over her/his threshold. Without reducing that stress, it can be difficult to alleviate pain. As Foot & Ankle specialists, our job is addressing the lower extremity problem and possible causes. Regarding extra body weight, we encourage patients to discuss diet and weight management plans with their primary care doctors. It is this team approach that gives you the best opportunity for pain relief and getting you back on your feet.      [facility] has a Comprehensive Weight Management Program. This program includes counseling, education, non-surgical and surgical approaches to weight loss. If you are interested in learning more either talk to you primary care provider or call [phone].

## 2019-09-12 NOTE — PROGRESS NOTES
"  ASSESSMENT/PLAN:    Encounter Diagnoses   Name Primary?     Plantar fasciitis, left Yes     Posterior tibial tendinitis of right lower extremity      Flat feet      The patient was educated about the causes and nature of arch and heel pain.  The anatomy and function of the plantar fascia was discussed.  The treatment plan discussed included icing, calf and plantar fascial stretching, avoidance of barefoot walking, wearing sturdy, supportive, stiffer-soled athletic-type shoes, activity modification, and over-the-counter arch supports versus custom orthoses.  A comprehensive After-Visit Summary was provided.     Jasmin Benavidez is referred to [facility] for prescription orthoses.    Physical therapy was ordered by Dr. Noriega.       I discussed the relevant anatomy and function of both the plantar fascia and the posterior tibial tendon.  The above plan applies to both problems.    Training and running the [employer] is per her discretion. I explained that if running causes more pain or if our plan does not seem to help, she should focus on lower impact exercise.    Body mass index is 25.02 kg/m .          Brandan Starr DPM, FACFAS, MS    [facility]      ____________________________________________________________________    HPI:       I was asked by Dr. Noriega to evaluate this patient, in consultation, regarding plantar fasciitis.     Chief Complaint: left heel pain  Onset of problem: 3+ months  Pain/ discomfort is described as:  Deep ache  Ratin/10   Frequency:  daliy    The pain is made worse with running  Previous treatment: stretching, ice, Aleve, \"recovery sandals.\"  The latter provide relief    Secondary concerns:  She also reports medial right foot pain. Sharp pain.     *  Patient Active Problem List   Diagnosis     Moderate major depression (HCC)     Blood type O+     Presence of intrauterine contraceptive device   *  *  Past Surgical " History:   Procedure Laterality Date     C ORAL SURGERY PROCEDURE  1999    wisdom teeth removal   *  *  Current Outpatient Medications   Medication Sig Dispense Refill     sertraline (ZOLOFT) 50 MG tablet TAKE 1 TABLET BY MOUTH  DAILY 90 tablet 3     APNO ointment CREA cream Apply small layer to each nipple after each feeding and cover with saran wrap.  No need to wipe off before nursing. (Patient not taking: Reported on 7/12/2019) 30 g 1     docusate sodium (COLACE) 100 MG tablet Take 100 mg by mouth daily (Patient not taking: Reported on 7/12/2019) 30 tablet 0     ondansetron (ZOFRAN ODT) 4 MG ODT tab Take 1 tablet (4 mg) by mouth every 8 hours as needed (Patient not taking: Reported on 7/12/2019) 10 tablet 0     Prenatal Vit-Fe Fumarate-FA (PRENATAL MULTIVITAMIN  PLUS IRON) 27-0.8 MG TABS Take 1 tablet by mouth daily         ROS:     A 10-point review of systems was performed and is positive for that noted above in the HPI and as seen below.  All other areas are negative.     Numbness in feet?  no   Calf pain with walking? no  Recent foot/ankle injury? no  Weight change over past 12 months? 20+ # loss  Self perception as overweight? no  Recent flu-like symptoms? no  Joint pain other than feet ? no    Social History: Employment:  Non profit program management;  Exercise/Physical activity:  Running and [employer];  Tobacco use:  no  Social History     Socioeconomic History     Marital status:      Spouse name: Not on file     Number of children: Not on file     Years of education: Not on file     Highest education level: Not on file   Occupational History     Not on file   Social Needs     Financial resource strain: Not on file     Food insecurity:     Worry: Not on file     Inability: Not on file     Transportation needs:     Medical: Not on file     Non-medical: Not on file   Tobacco Use     Smoking status: Never Smoker     Smokeless tobacco: Never Used   Substance and Sexual Activity     Alcohol use:  "No     Drug use: No     Sexual activity: Yes     Partners: Male     Comment: never been sexually active -   Lifestyle     Physical activity:     Days per week: Not on file     Minutes per session: Not on file     Stress: Not on file   Relationships     Social connections:     Talks on phone: Not on file     Gets together: Not on file     Attends [employer] service: Not on file     Active member of club or organization: Not on file     Attends meetings of clubs or organizations: Not on file     Relationship status: Not on file     Intimate partner violence:     Fear of current or ex partner: Not on file     Emotionally abused: Not on file     Physically abused: Not on file     Forced sexual activity: Not on file   Other Topics Concern     Parent/sibling w/ CABG, MI or angioplasty before 65F 55M? No   Social History Narrative     Not on file       Family history:  Family History   Problem Relation Age of Onset     Hypertension Mother      Tumor Mother         in stomach     Hypertension Father      Allergies Father         has seasonal allergies     Diabetes Maternal Uncle      Breast Cancer Paternal Grandmother         early 60's     Cancer Paternal Grandfather         lung ca     Cancer - colorectal Other         paternal aunt, dx at 54     Cancer Other         cervical cancer-treated in 2006       Rheumatoid arthritis:  no  Foot Problems: parent - plantar fasciitis  Diabetes: no      EXAM:    Vitals: /80   Ht 1.676 m (5' 6\")   Wt 70.3 kg (155 lb)   BMI 25.02 kg/m    BMI: Body mass index is 25.02 kg/m .  Height: 5' 6\"    Constitutional/ general:  Pt is in no apparent distress, appears well-nourished.  Cooperative with history and physical exam.     Vascular:  Pedal pulses are palpable bilaterally for both the DP and PT arteries.  CFT < 3 sec.  No edema.  Pedal hair growth noted.     Neuro:  Alert and oriented x 3. Coordinated gait.  Light touch sensation is intact to the L4, L5, S1 distributions. No " obvious deficits.  No evidence of neurological-based weakness, spasticity, or contracture in the lower extremities.     Derm: Normal texture and turgor.  No erythema, ecchymosis, or cyanosis.  No open lesions.     Musculoskeletal:    Lower extremity muscle strength is normal.  Patient is ambulatory without an assistive device or brace .  Decrease in medial longitudinal arch with weight bearing.  Pain on palpation to the plantar medial aspect of the left heel.  No significant pain with side-to-side compression of the heel.  No nodularity noted.  Mild pain with inversion of the right foot against resistance.

## 2019-09-12 NOTE — LETTER
"    2019         RE: Jasmin Benavidez  [address] [address]        Dear Colleague,    Thank you for referring your patient, Jasmin Benavidez, to the [facility]. Please see a copy of my visit note below.      ASSESSMENT/PLAN:    Encounter Diagnoses   Name Primary?     Plantar fasciitis, left Yes     Posterior tibial tendinitis of right lower extremity      Flat feet      The patient was educated about the causes and nature of arch and heel pain.  The anatomy and function of the plantar fascia was discussed.  The treatment plan discussed included icing, calf and plantar fascial stretching, avoidance of barefoot walking, wearing sturdy, supportive, stiffer-soled athletic-type shoes, activity modification, and over-the-counter arch supports versus custom orthoses.  A comprehensive After-Visit Summary was provided.     Jasmin Benavidez is referred to [facility] for prescription orthoses.    Physical therapy was ordered by Dr. Noriega.       I discussed the relevant anatomy and function of both the plantar fascia and the posterior tibial tendon.  The above plan applies to both problems.    Training and running the DEXMA is per her discretion. I explained that if running causes more pain or if our plan does not seem to help, she should focus on lower impact exercise.    Body mass index is 25.02 kg/m .          Brandan Starr DPM, FACFAS, MS    [facility]      ____________________________________________________________________    HPI:       I was asked by Dr. Noriega to evaluate this patient, in consultation, regarding plantar fasciitis.     Chief Complaint: left heel pain  Onset of problem: 3+ months  Pain/ discomfort is described as:  Deep ache  Ratin/10   Frequency:  daliy    The pain is made worse with running  Previous treatment: stretching, ice, Aleve, \"recovery sandals.\"  The latter provide relief    Secondary " concerns:  She also reports medial right foot pain. Sharp pain.     *  Patient Active Problem List   Diagnosis     Moderate major depression (HCC)     Blood type O+     Presence of intrauterine contraceptive device   *  *  Past Surgical History:   Procedure Laterality Date     C ORAL SURGERY PROCEDURE  1999    wisdom teeth removal   *  *  Current Outpatient Medications   Medication Sig Dispense Refill     sertraline (ZOLOFT) 50 MG tablet TAKE 1 TABLET BY MOUTH  DAILY 90 tablet 3     APNO ointment CREA cream Apply small layer to each nipple after each feeding and cover with saran wrap.  No need to wipe off before nursing. (Patient not taking: Reported on 7/12/2019) 30 g 1     docusate sodium (COLACE) 100 MG tablet Take 100 mg by mouth daily (Patient not taking: Reported on 7/12/2019) 30 tablet 0     ondansetron (ZOFRAN ODT) 4 MG ODT tab Take 1 tablet (4 mg) by mouth every 8 hours as needed (Patient not taking: Reported on 7/12/2019) 10 tablet 0     Prenatal Vit-Fe Fumarate-FA (PRENATAL MULTIVITAMIN  PLUS IRON) 27-0.8 MG TABS Take 1 tablet by mouth daily         ROS:     A 10-point review of systems was performed and is positive for that noted above in the HPI and as seen below.  All other areas are negative.     Numbness in feet?  no   Calf pain with walking? no  Recent foot/ankle injury? no  Weight change over past 12 months? 20+ # loss  Self perception as overweight? no  Recent flu-like symptoms? no  Joint pain other than feet ? no    Social History: Employment:  Non profit program management;  Exercise/Physical activity:  Running and [employer];  Tobacco use:  no  Social History     Socioeconomic History     Marital status:      Spouse name: Not on file     Number of children: Not on file     Years of education: Not on file     Highest education level: Not on file   Occupational History     Not on file   Social Needs     Financial resource strain: Not on file     Food insecurity:     Worry: Not on file  "    Inability: Not on file     Transportation needs:     Medical: Not on file     Non-medical: Not on file   Tobacco Use     Smoking status: Never Smoker     Smokeless tobacco: Never Used   Substance and Sexual Activity     Alcohol use: No     Drug use: No     Sexual activity: Yes     Partners: Male     Comment: never been sexually active -   Lifestyle     Physical activity:     Days per week: Not on file     Minutes per session: Not on file     Stress: Not on file   Relationships     Social connections:     Talks on phone: Not on file     Gets together: Not on file     Attends [employer] service: Not on file     Active member of club or organization: Not on file     Attends meetings of clubs or organizations: Not on file     Relationship status: Not on file     Intimate partner violence:     Fear of current or ex partner: Not on file     Emotionally abused: Not on file     Physically abused: Not on file     Forced sexual activity: Not on file   Other Topics Concern     Parent/sibling w/ CABG, MI or angioplasty before 65F 55M? No   Social History Narrative     Not on file       Family history:  Family History   Problem Relation Age of Onset     Hypertension Mother      Tumor Mother         in stomach     Hypertension Father      Allergies Father         has seasonal allergies     Diabetes Maternal Uncle      Breast Cancer Paternal Grandmother         early 60's     Cancer Paternal Grandfather         lung ca     Cancer - colorectal Other         paternal aunt, dx at 54     Cancer Other         cervical cancer-treated in 2006       Rheumatoid arthritis:  no  Foot Problems: parent - plantar fasciitis  Diabetes: no      EXAM:    Vitals: /80   Ht 1.676 m (5' 6\")   Wt 70.3 kg (155 lb)   BMI 25.02 kg/m     BMI: Body mass index is 25.02 kg/m .  Height: 5' 6\"    Constitutional/ general:  Pt is in no apparent distress, appears well-nourished.  Cooperative with history and physical exam.     Vascular:  Pedal pulses " are palpable bilaterally for both the DP and PT arteries.  CFT < 3 sec.  No edema.  Pedal hair growth noted.     Neuro:  Alert and oriented x 3. Coordinated gait.  Light touch sensation is intact to the L4, L5, S1 distributions. No obvious deficits.  No evidence of neurological-based weakness, spasticity, or contracture in the lower extremities.     Derm: Normal texture and turgor.  No erythema, ecchymosis, or cyanosis.  No open lesions.     Musculoskeletal:    Lower extremity muscle strength is normal.  Patient is ambulatory without an assistive device or brace .  Decrease in medial longitudinal arch with weight bearing.  Pain on palpation to the plantar medial aspect of the left heel.  No significant pain with side-to-side compression of the heel.  No nodularity noted.  Mild pain with inversion of the right foot against resistance.            Again, thank you for allowing me to participate in the care of your patient.        Sincerely,        Brandan Starr DPM

## 2019-09-16 ENCOUNTER — THERAPY VISIT (OUTPATIENT)
Dept: PHYSICAL THERAPY | Facility: CLINIC | Age: 39
End: 2019-09-16
Payer: COMMERCIAL

## 2019-09-16 DIAGNOSIS — M79.671 BILATERAL FOOT PAIN: Primary | ICD-10-CM

## 2019-09-16 DIAGNOSIS — M79.672 BILATERAL FOOT PAIN: Primary | ICD-10-CM

## 2019-09-16 DIAGNOSIS — M72.2 PLANTAR FASCIITIS: ICD-10-CM

## 2019-09-16 PROCEDURE — 97161 PT EVAL LOW COMPLEX 20 MIN: CPT | Mod: GP | Performed by: PHYSICAL THERAPIST

## 2019-09-16 PROCEDURE — 97110 THERAPEUTIC EXERCISES: CPT | Mod: GP | Performed by: PHYSICAL THERAPIST

## 2019-09-16 NOTE — PROGRESS NOTES
[facility] Initial Evaluation  Subjective:  The history is provided by the patient.   Where condition occurred: at home.  and reported as 5/10 on pain scale. General health as reported by patient is good. Pertinent medical history includes:  Depression.      Current medications:  Anti-depressants and anti-inflammatory.     Pain is described as aching and is intermittent. Pain is worse during the day. Since onset symptoms are unchanged.      Patient is [employer].   Barriers include:  None as reported by patient.  Red flags:  None as reported by patient.  Type of problem:  Bilateral feet   Condition occurred with:  Running. This is a new condition   Problem details: B foot pain started 6/16/19 with running. Hurts mostly with running but will prolong all day and into the night. No change in footwear lately. Did start running more outside this summer 2-3 days/week for 2-7 miles per run. Prefers to workout at [employer] with treadmill running involved. .    Radiates to:  No radiation.                         ANKLE EVALUATION    Static Posture  Toes touching at rest: Yes  Arch: Mild collapse of arch in standing  Toes In/Out: Mild toes out    Dynamic Movement Screen  Single leg stance observations: Diffculty with balance eyes closed bilateral  Double limb squat observations: Good technique/no significant findings  Single limb squat observations: Not assessed  Gait: No significant findings    Ankle Range of Motion  Ankle AROM Dorsiflexion Plantarflexion Inversion Eversion KTW (cm)   Left WNL WNL WNL WNL WNL   Right WNL WNL WNL WNL WNL   **KTW (knee to wall)- distance between wall and great toe where pt can touch knee to wall and keep heel in contact with floor    Ankle Strength  Ankle MMT Dorsiflexion Plantarflexion Inversion Eversion   Left 5-/5 5-/5 5-/5 5-/5   Right 5-/5 5-/5 5-/5 5-/5     Foot Strength:   Right Left   Big Toe Isolated Extension 5-/5 5-/5               Special  Tests:  Ligament testing: No significant findings    Palpation  Left: No tenderness to palpation  Right: No tenderness to palpation    Assessment/Plan:  Patient is a 38 year old female with bilateral foot complaints.    Patient has the following significant findings with corresponding treatment plan.                Diagnosis 1:  B foot pain  Pain -  manual therapy, self management, education and home program  Decreased ROM/flexibility - manual therapy and therapeutic exercise  Decreased joint mobility - manual therapy and therapeutic exercise  Decreased strength - therapeutic exercise and therapeutic activities  Impaired balance - neuro re-education and therapeutic activities  Decreased proprioception - neuro re-education and therapeutic activities    Therapy Evaluation Codes:   1) History comprised of:   Personal factors that impact the plan of care:      None.    Comorbidity factors that impact the plan of care are:      Depression.     Medications impacting care: Anti-depressant and Anti-inflammatory.  2) Examination of Body Systems comprised of:   Body structures and functions that impact the plan of care:      bilateral feet.   Activity limitations that impact the plan of care are:      Running, Sports, Squatting/kneeling, Stairs, Walking and Working.  3) Clinical presentation characteristics are:   Stable/Uncomplicated.  4) Decision-Making    Low complexity using standardized patient assessment instrument and/or measureable assessment of functional outcome.  Cumulative Therapy Evaluation is: Low complexity.    Previous and current functional limitations:  (See Goal Flow Sheet for this information)    Short term and Long term goals: (See Goal Flow Sheet for this information)     Communication ability:  Patient appears to be able to clearly communicate and understand verbal and written communication and follow directions correctly.  Treatment Explanation - The following has been discussed with the patient:   RX  ordered/plan of care  Anticipated outcomes  Possible risks and side effects  This patient would benefit from PT intervention to resume normal activities.   Rehab potential is fair.    Frequency:  2 X a month, once daily  Duration:  for 3 months  Discharge Plan:  Achieve all LTG.  Independent in home treatment program.  Reach maximal therapeutic benefit.    Please refer to the daily flowsheet for treatment today, total treatment time and time spent performing 1:1 timed codes.

## 2019-09-30 ENCOUNTER — THERAPY VISIT (OUTPATIENT)
Dept: PHYSICAL THERAPY | Facility: CLINIC | Age: 39
End: 2019-09-30
Payer: COMMERCIAL

## 2019-09-30 DIAGNOSIS — M79.671 BILATERAL FOOT PAIN: ICD-10-CM

## 2019-09-30 DIAGNOSIS — M79.672 BILATERAL FOOT PAIN: ICD-10-CM

## 2019-09-30 PROCEDURE — 97112 NEUROMUSCULAR REEDUCATION: CPT | Mod: GP | Performed by: PHYSICAL THERAPIST

## 2019-09-30 PROCEDURE — 97110 THERAPEUTIC EXERCISES: CPT | Mod: GP | Performed by: PHYSICAL THERAPIST

## 2019-10-03 ENCOUNTER — HEALTH MAINTENANCE LETTER (OUTPATIENT)
Age: 39
End: 2019-10-03

## 2019-10-14 ENCOUNTER — THERAPY VISIT (OUTPATIENT)
Dept: PHYSICAL THERAPY | Facility: CLINIC | Age: 39
End: 2019-10-14
Payer: COMMERCIAL

## 2019-10-14 DIAGNOSIS — M79.672 BILATERAL FOOT PAIN: ICD-10-CM

## 2019-10-14 DIAGNOSIS — M79.671 BILATERAL FOOT PAIN: ICD-10-CM

## 2019-10-14 PROCEDURE — 97112 NEUROMUSCULAR REEDUCATION: CPT | Mod: GP | Performed by: PHYSICAL THERAPIST

## 2019-10-14 PROCEDURE — 97110 THERAPEUTIC EXERCISES: CPT | Mod: GP | Performed by: PHYSICAL THERAPIST

## 2019-11-04 ENCOUNTER — THERAPY VISIT (OUTPATIENT)
Dept: PHYSICAL THERAPY | Facility: CLINIC | Age: 39
End: 2019-11-04
Payer: COMMERCIAL

## 2019-11-04 DIAGNOSIS — M79.672 BILATERAL FOOT PAIN: ICD-10-CM

## 2019-11-04 DIAGNOSIS — M79.671 BILATERAL FOOT PAIN: ICD-10-CM

## 2019-11-04 PROCEDURE — 97112 NEUROMUSCULAR REEDUCATION: CPT | Mod: GP | Performed by: PHYSICAL THERAPIST

## 2019-11-04 PROCEDURE — 97110 THERAPEUTIC EXERCISES: CPT | Mod: GP | Performed by: PHYSICAL THERAPIST

## 2019-11-05 ENCOUNTER — ALLIED HEALTH/NURSE VISIT (OUTPATIENT)
Dept: NURSING | Facility: CLINIC | Age: 39
End: 2019-11-05
Payer: COMMERCIAL

## 2019-11-05 DIAGNOSIS — Z23 NEED FOR PROPHYLACTIC VACCINATION AND INOCULATION AGAINST INFLUENZA: Primary | ICD-10-CM

## 2019-11-05 PROCEDURE — 90686 IIV4 VACC NO PRSV 0.5 ML IM: CPT

## 2019-11-05 PROCEDURE — 90471 IMMUNIZATION ADMIN: CPT

## 2020-01-06 PROBLEM — M79.671 BILATERAL FOOT PAIN: Status: RESOLVED | Noted: 2019-09-16 | Resolved: 2020-01-06

## 2020-01-06 PROBLEM — M79.672 BILATERAL FOOT PAIN: Status: RESOLVED | Noted: 2019-09-16 | Resolved: 2020-01-06

## 2020-01-06 NOTE — PROGRESS NOTES
DISCHARGE REPORT    Progress reporting period is from 9/16/19 to 11/4/19.       SUBJECTIVE  Subjective changes noted by patient:  .  Subjective: Jasmin reports being non-compliant with HEP.     Current pain level is  Current Pain level: 2/10.     Previous pain level was   Initial Pain level: 5/10.   Changes in function:  Yes (See Goal flowsheet attached for changes in current functional level)  Adverse reaction to treatment or activity: None    OBJECTIVE  Changes noted in objective findings:  Patient has failed to return to therapy so current objective findings are unknown.  Objective: KTW over 5 inches bilateral     ASSESSMENT/PLAN  Updated problem list and treatment plan: Diagnosis 1:  B feet  unknown  STG/LTGs have been met or progress has been made towards goals:  Yes (See Goal flow sheet completed today.)  Assessment of Progress: Patient has not returned to therapy.  Current status is unknown and discharge G code cannot be reported.  Self Management Plans:  Patient has been instructed in a home treatment program.    Jasmin continues to require the following intervention to meet STG and LTG's:  PT intervention is no longer required to meet STG/LTG.    Recommendations:  This patient is ready to be discharged from therapy and continue their home treatment program.    Please refer to the daily flowsheet for treatment today, total treatment time and time spent performing 1:1 timed codes.

## 2020-03-18 NOTE — PROGRESS NOTES
[facility] TMS Program  [address], [address]  [place] [address]  TMS Procedure Note   Aure Joy MRN# [id number]  Age: 51 year old year old YOB: 1968    Pre-Procedure:  History and Physical: Reviewed in medical record  Consent Signed by: Aure Joy  On: 1/31/2020    Clinical Narrative:  Patient tolerating treatment with no side effects.  Patient reports no change in mood.      Indications for TMS:  MDD, recurrent, severe; 4+ medication trials (from 2+ classes) ineffective; Psychotherapy ineffective.     Pre-Procedure Diagnosis:  MDD, recurrent, severe F33.2    Treatment Hx:  Treatment number this series: 32  Total lifetime treatment number: 109    Allergies   Allergen Reactions     Codeine Nausea     Other  [No Clinical Screening - See Comments] Nausea and Vomiting and Other (See Comments)     general anesthesia- uncontrolled vomitting      /77 (BP Location: Right arm, Patient Position: Sitting, Cuff Size: Adult Regular)   Pulse 88     Pause for the Cause  Right patient:  Yes  Right procedure/correct coil:  Yes; rTMS; cpt 99169; F8 coil.   Earplugs in place:  Yes    Procedure  Patient was seated in procedure chair. Identity and procedure was verified. Ear plugs were placed in ears and patient-specific cap was placed on head and tightened appropriately. Ruler locations were verified. Coil was placed at treatment location and stimulator was set to parameters described below. A test train was delivered and pt tolerated train. Given pt tolerance, 60 treatment trains were delivered to the left side and 3 trains were delivered to the right side. Pt tolerated procedure with forehead movement.      Motor Threshold Determination  Distance from nasion to inion: 35.5  MT 1: 0 - 6 - 16 @ 69% on 1/29/2018  MT 2: 0 - 6 - 16 @ 69% on 2/6/2018   MT 3: 0 - 6 - 16 @ 69% on 2/13/2018   MT 4: 0 - 6 - 16 @ 69% on 2/23/2018   MT 5: 0 - 6 - 16 @ 69% on 3/2/2018   MT 6: 0 - 5.5 - 15.5(always  "CNM PROGRESS NOTE    SUBJECTIVE:  Per RN, Jasmin has been resting overnight after taking Vistaril.      OBJECTIVE:  /52  Pulse 57  Temp 98  F (36.7  C) (Oral)  Resp 16  Ht 1.702 m (5' 7\")  Wt 91.6 kg (202 lb)  LMP 2017  BMI 31.64 kg/m2    Fetal heart tones:   Per IA protocol  Baseline 130, +accels, no decels auscultated    Contractions: Pt is daisy infrequently per RN assessment.  Patient was daisy q 2-4min last night, but recently contractions have been palpated q 10-15 min approximately    Cervix: deferred  ROM: clear fluid x 26 hrs    Pitocin- none  Antibiotics- PCN  Cervical ripening: misoprostol    ASSESSMENT:  IUP @ 40w4d early labor, minimal/no progress and rupture of membranes since 0430 10/12/17 (26 hrs)  GBS- positive     PLAN:   Will have next CNM do SVE  cervical ripening with low dose pitocin if ripening still neededn or labor augmentation with pitocin per protocol   Continuous EFM if pitocin started  Anticipate  today      Tammy Goodrich, APRLOLITA CNM    " use intersection at left side of ruler)@ 85% on 8/23/19  MT 7: 0 - 5.5 - 15.5(always use intersection at left side of ruler)@ 80% on 8/29/19  MT 8: 0 - 5.5 - 37.5(always use intersection at left side of ruler)@ 76% on 9/5/19 (right side using EMG on left hand)  MT 9: C2 (R side using EMG on L hand) 78% on 9/12/2019  MT 10: C2 (R side using EMG on L hand) 76% on 9/26/2019  MT 11: C2 (R side using EMG on L hand) 82 on 1/31/2020    LDLPFC:  Frequency: 50 Hz  Number of pulses:  3                        Number of bursts: 10  Burst frequency: 5 Hz  Cycle time: 10 sec  Total pulses delivered: 1800  Tx Loc: F3  Energy: 80% (97% MT, maximum due to stimulator limitations)  Number of Cycles: 60 trains    RDLPFC:  Frequency: 50 Hz  Number of pulses:  3                        Number of bursts: 200  Burst frequency: 5 Hz  Cycle time: 100.0 machine overheated prior to 3rd train  Total pulses delivered: 1800  Tx Loc: F4 (right side)  Energy: 80% (97% MT, maximum due to stimulator limitations)  Number of Cycles: 3 trains    Rating Scales:  Date MADRS IDS-SR PHQ-9   01/31/20 23  8   2/7/20  28 7   2/14/20  27 6   2/21/20  26 5   2/28/20  32 7   3/13/20  24 5             Post-Procedure Diagnosis:  MDD, recurrent, severe 296.3    Rosa Almeida   [facility]      Plan   - Cont TMS  - ReMT    I did not see this patient but was available for potential intervention throughout the entire TMS visit.      William Rodriguez MD  [facility]

## 2020-05-13 ENCOUNTER — MYC MEDICAL ADVICE (OUTPATIENT)
Dept: DERMATOLOGY | Facility: CLINIC | Age: 40
End: 2020-05-13

## 2020-05-13 ENCOUNTER — VIRTUAL VISIT (OUTPATIENT)
Dept: DERMATOLOGY | Facility: CLINIC | Age: 40
End: 2020-05-13
Payer: COMMERCIAL

## 2020-05-13 DIAGNOSIS — R21 RASH AND OTHER NONSPECIFIC SKIN ERUPTION: Primary | ICD-10-CM

## 2020-05-13 PROCEDURE — 99213 OFFICE O/P EST LOW 20 MIN: CPT | Mod: GT | Performed by: PHYSICIAN ASSISTANT

## 2020-05-13 RX ORDER — PIMECROLIMUS 10 MG/G
CREAM TOPICAL 2 TIMES DAILY
Qty: 30 G | Refills: 1 | Status: SHIPPED | OUTPATIENT
Start: 2020-05-13 | End: 2021-02-18

## 2020-05-13 RX ORDER — DOXYCYCLINE HYCLATE 100 MG
TABLET ORAL
Qty: 60 TABLET | Refills: 1 | Status: SHIPPED | OUTPATIENT
Start: 2020-05-13 | End: 2020-07-06

## 2020-05-13 NOTE — PROGRESS NOTES
"Jasmin Benavidez is a 39 year old female who is being evaluated via a billable video visit.      The patient has been notified of following:   \"This video visit will be conducted via a call between you and your physician/provider. We have found that certain health care needs can be provided without the need for an in-person physical exam.  This service lets us provide the care you need with a video conversation.  If a prescription is necessary we can send it directly to your pharmacy.  If lab work is needed we can place an order for that and you can then stop by our lab to have the test done at a later time.    Video visits are billed at different rates depending on your insurance coverage.  Please reach out to your insurance provider with any questions.    If during the course of the call the physician/provider feels a video visit is not appropriate, you will not be charged for this service.\"    Patient has given verbal consent for Video visit? Yes    How would you like to obtain your AVS? Josesito    Patient would like the video invitation sent by: Send to e-mail at: [email or web address]      Video Start Time:2.12pm        Jasmin Benavidez is a 39 year old year old female patient here today for rash on left cheek, right jawline, right cheek, and right scalp .  Patient states this has been present for a short while.  Patient reports the following symptoms:  Pustules, swollen lymph nodes. Occurred after using curly hair products.  Patient reports the following previous treatments keflex x a short while with minimal improvement. Benadryl with improvement of swollen lymph nodes. Some improvement of rash. Patient reports the following modifying factors none.  Associated symptoms: none.  Patient has no other skin complaints today.  Remainder of the HPI, Meds, PMH, Allergies, FH, and SH was reviewed in chart.      Past Medical History:   Diagnosis Date     Allergy, unspecified not elsewhere classified     using prn otc meds, w orks "     Blood type O+ 10/17/2017     Migraine, unspecified, without mention of intractable migraine without mention of status migrainosus     no issues any more       Past Surgical History:   Procedure Laterality Date     C ORAL SURGERY PROCEDURE  1999    wisdom teeth removal        Family History   Problem Relation Age of Onset     Hypertension Mother      Tumor Mother         in stomach     Hypertension Father      Allergies Father         has seasonal allergies     Diabetes Maternal Uncle      Breast Cancer Paternal Grandmother         early 60's     Cancer Paternal Grandfather         lung ca     Cancer - colorectal Other         paternal aunt, dx at 54     Cancer Other         cervical cancer-treated in 2006       Social History     Socioeconomic History     Marital status:      Spouse name: Not on file     Number of children: Not on file     Years of education: Not on file     Highest education level: Not on file   Occupational History     Not on file   Social Needs     Financial resource strain: Not on file     Food insecurity     Worry: Not on file     Inability: Not on file     Transportation needs     Medical: Not on file     Non-medical: Not on file   Tobacco Use     Smoking status: Never Smoker     Smokeless tobacco: Never Used   Substance and Sexual Activity     Alcohol use: No     Drug use: No     Sexual activity: Yes     Partners: Male     Comment: never been sexually active -RW   Lifestyle     Physical activity     Days per week: Not on file     Minutes per session: Not on file     Stress: Not on file   Relationships     Social connections     Talks on phone: Not on file     Gets together: Not on file     Attends Islam service: Not on file     Active member of club or organization: Not on file     Attends meetings of clubs or organizations: Not on file     Relationship status: Not on file     Intimate partner violence     Fear of current or ex partner: Not on file     Emotionally abused: Not  on file     Physically abused: Not on file     Forced sexual activity: Not on file   Other Topics Concern     Parent/sibling w/ CABG, MI or angioplasty before 65F 55M? No   Social History Narrative     Not on file       Outpatient Encounter Medications as of 5/13/2020   Medication Sig Dispense Refill     doxycycline hyclate (VIBRA-TABS) 100 MG tablet Take one by mouth in the AM and one in the PM. Take with food, avoiding high calcium foods. 60 tablet 1     pimecrolimus (ELIDEL) 1 % external cream Apply topically 2 times daily To aa on face 30 g 1     sertraline (ZOLOFT) 50 MG tablet TAKE 1 TABLET BY MOUTH  DAILY 90 tablet 3     [DISCONTINUED] APNO ointment CREA cream Apply small layer to each nipple after each feeding and cover with saran wrap.  No need to wipe off before nursing. (Patient not taking: Reported on 7/12/2019) 30 g 1     [DISCONTINUED] docusate sodium (COLACE) 100 MG tablet Take 100 mg by mouth daily (Patient not taking: Reported on 7/12/2019) 30 tablet 0     [DISCONTINUED] ondansetron (ZOFRAN ODT) 4 MG ODT tab Take 1 tablet (4 mg) by mouth every 8 hours as needed (Patient not taking: Reported on 7/12/2019) 10 tablet 0     [DISCONTINUED] Prenatal Vit-Fe Fumarate-FA (PRENATAL MULTIVITAMIN  PLUS IRON) 27-0.8 MG TABS Take 1 tablet by mouth daily       No facility-administered encounter medications on file as of 5/13/2020.              Review Of Systems  Skin: rash  Eyes: negative  Ears/Nose/Throat: negative  Respiratory: No shortness of breath, dyspnea on exertion, cough, or hemoptysis  Cardiovascular: negative  Gastrointestinal: negative  Genitourinary: negative  Musculoskeletal: negative  Neurologic: negative  Psychiatric: negative  Hematologic/Lymphatic/Immunologic: negative  Endocrine: negative      Objective:    Alert & Orientedx3, Mood & Affect wnl,   General appearance normal  Alert, oriented and in no acute distress    Photos:face, neck, right parietal scalp  Physical exam: red pustular plaque on  left cheek, excoriate smooth red patch on right cheek, pink smooth patch on right submandibular    Musculoskeletal: normal movement of neck and UE on video  Pulm: Breathing Normal, talking in normal sentences, no shortness of breath during conversation  Neuro/Psych: Orientation:Alert and Orientedx3 ; Mood/Affect:normal ; no anxiety or depression       Assessment and Plan:  1) ACD vs acne rosacea  Begin elidel bid to aa  Begin doxycyline one tablet in the am and one tablet in the pm x 1 month  Side effects of Doxycycline: sun sensitivity, stomach upset, dizziness and heartburn. If you experience a sudden onset of rash or severe unusual headache, discontinue the medication and contact your clinician immediately.       Return to clinic 1-2 months    Teledermatology information:  - Location of patient: home  - Location of teledermatologist: [facility]  - Reason teledermatology is appropriate: of National Emergency Regarding Coronavirus disease (COVID 19) Outbreak  - The patient's condition can safely be assessed using telemedicine: yes  - Method of transmission: store and forward teledermatology  - Image quality and interpretability: acceptable  - Physician has received verbal consent for a Video/Photos Visit from the patient? Yes  - In-person dermatology visit recommendation: no  - Date of images: 5/13/20  - Service start time:2.12am/pm  - Service end time:2.20am/pm  - Date of report: 05/13/20  Consent has been obtained for this service by 1 care team member: yes.

## 2020-07-06 DIAGNOSIS — R21 RASH AND OTHER NONSPECIFIC SKIN ERUPTION: ICD-10-CM

## 2020-07-06 RX ORDER — DOXYCYCLINE HYCLATE 100 MG
TABLET ORAL
Qty: 60 TABLET | Refills: 1 | Status: SHIPPED | OUTPATIENT
Start: 2020-07-06 | End: 2021-02-18

## 2020-07-06 NOTE — TELEPHONE ENCOUNTER
Requested Prescriptions   Pending Prescriptions Disp Refills     doxycycline hyclate (VIBRA-TABS) 100 MG tablet 60 tablet 1     Sig: Take one by mouth in the AM and one in the PM. Take with food, avoiding high calcium foods.       There is no refill protocol information for this order

## 2020-07-22 DIAGNOSIS — F32.1 MAJOR DEPRESSIVE DISORDER, SINGLE EPISODE, MODERATE (H): ICD-10-CM

## 2020-07-30 ENCOUNTER — MYC MEDICAL ADVICE (OUTPATIENT)
Dept: FAMILY MEDICINE | Facility: CLINIC | Age: 40
End: 2020-07-30

## 2020-07-30 NOTE — TELEPHONE ENCOUNTER
Pt very overdue as she was to have f/u appt last fall for depression.    PHQ9 sent to pt. Dr Noriega has no openings for quite a while now    Melissa Jamil, RN, BSN

## 2020-07-31 NOTE — TELEPHONE ENCOUNTER
PHQ-9 score:    PHQ 7/30/2020   PHQ-9 Total Score 4   Q9: Thoughts of better off dead/self-harm past 2 weeks Not at all       Pt filled out PHQ9 yesterday and Dr Noriega refilled her sertraline for 90 days    Melissa Jamil, RN, BSN

## 2020-11-05 ENCOUNTER — ALLIED HEALTH/NURSE VISIT (OUTPATIENT)
Dept: NURSING | Facility: CLINIC | Age: 40
End: 2020-11-05
Payer: COMMERCIAL

## 2020-11-05 DIAGNOSIS — Z23 NEED FOR PROPHYLACTIC VACCINATION AND INOCULATION AGAINST INFLUENZA: Primary | ICD-10-CM

## 2020-11-05 PROCEDURE — 90686 IIV4 VACC NO PRSV 0.5 ML IM: CPT

## 2020-11-05 PROCEDURE — 90471 IMMUNIZATION ADMIN: CPT

## 2020-11-05 PROCEDURE — 99207 PR NO CHARGE NURSE ONLY: CPT

## 2021-01-15 ENCOUNTER — HEALTH MAINTENANCE LETTER (OUTPATIENT)
Age: 41
End: 2021-01-15

## 2021-02-15 ASSESSMENT — ENCOUNTER SYMPTOMS
DYSURIA: 0
SHORTNESS OF BREATH: 0
COUGH: 0
ARTHRALGIAS: 0
NAUSEA: 0
HEMATOCHEZIA: 0
SORE THROAT: 0
DIARRHEA: 0
FEVER: 0
PALPITATIONS: 0
FREQUENCY: 0
HEADACHES: 0
WEAKNESS: 0
PARESTHESIAS: 0
HEARTBURN: 0
DIZZINESS: 0
EYE PAIN: 0
BREAST MASS: 0
CONSTIPATION: 0
JOINT SWELLING: 0
ABDOMINAL PAIN: 0
CHILLS: 0
NERVOUS/ANXIOUS: 0
HEMATURIA: 0
MYALGIAS: 0

## 2021-02-18 ENCOUNTER — OFFICE VISIT (OUTPATIENT)
Dept: FAMILY MEDICINE | Facility: CLINIC | Age: 41
End: 2021-02-18
Payer: COMMERCIAL

## 2021-02-18 VITALS
WEIGHT: 169 LBS | BODY MASS INDEX: 26.53 KG/M2 | RESPIRATION RATE: 16 BRPM | HEIGHT: 67 IN | DIASTOLIC BLOOD PRESSURE: 68 MMHG | OXYGEN SATURATION: 99 % | TEMPERATURE: 97.6 F | HEART RATE: 55 BPM | SYSTOLIC BLOOD PRESSURE: 110 MMHG

## 2021-02-18 DIAGNOSIS — Z12.4 SCREENING FOR CERVICAL CANCER: ICD-10-CM

## 2021-02-18 DIAGNOSIS — Z97.5 PRESENCE OF INTRAUTERINE CONTRACEPTIVE DEVICE: ICD-10-CM

## 2021-02-18 DIAGNOSIS — Z00.00 ROUTINE GENERAL MEDICAL EXAMINATION AT A HEALTH CARE FACILITY: Primary | ICD-10-CM

## 2021-02-18 PROCEDURE — 99395 PREV VISIT EST AGE 18-39: CPT | Performed by: FAMILY MEDICINE

## 2021-02-18 PROCEDURE — G0145 SCR C/V CYTO,THINLAYER,RESCR: HCPCS | Performed by: FAMILY MEDICINE

## 2021-02-18 PROCEDURE — 87624 HPV HI-RISK TYP POOLED RSLT: CPT | Performed by: FAMILY MEDICINE

## 2021-02-18 ASSESSMENT — ENCOUNTER SYMPTOMS
HEARTBURN: 0
EYE PAIN: 0
COUGH: 0
FREQUENCY: 0
HEMATOCHEZIA: 0
BREAST MASS: 0
NAUSEA: 0
DIZZINESS: 0
SHORTNESS OF BREATH: 0
ABDOMINAL PAIN: 0
MYALGIAS: 0
SORE THROAT: 0
PARESTHESIAS: 0
PALPITATIONS: 0
HEADACHES: 0
DIARRHEA: 0
ARTHRALGIAS: 0
CHILLS: 0
NERVOUS/ANXIOUS: 0
HEMATURIA: 0
FEVER: 0
WEAKNESS: 0
DYSURIA: 0
JOINT SWELLING: 0
CONSTIPATION: 0

## 2021-02-18 ASSESSMENT — MIFFLIN-ST. JEOR: SCORE: 1466.27

## 2021-02-18 ASSESSMENT — PATIENT HEALTH QUESTIONNAIRE - PHQ9: SUM OF ALL RESPONSES TO PHQ QUESTIONS 1-9: 3

## 2021-02-18 NOTE — PROGRESS NOTES
SUBJECTIVE:   CC: Jasmin Benavidez is an 40 year old woman who presents for preventive health visit.   {  Healthy Habits:     Getting at least 3 servings of Calcium per day:  Yes    Bi-annual eye exam:  NO    Dental care twice a year:  Yes    Sleep apnea or symptoms of sleep apnea:  None    Diet:  Regular (no restrictions) and Breakfast skipped    Frequency of exercise:  1 day/week    Duration of exercise:  15-30 minutes    Taking medications regularly:  Yes    Medication side effects:  None    PHQ-2 Total Score: 1    Additional concerns today:  Yes    Depression Followup    How are you doing with your depression since your last visit? Improved     Are you having other symptoms that might be associated with depression? No    Have you had a significant life event?  No     Are you feeling anxious or having panic attacks?   No    Do you have any concerns with your use of alcohol or other drugs? No    Social History     Tobacco Use     Smoking status: Never Smoker     Smokeless tobacco: Never Used   Substance Use Topics     Alcohol use: Yes     Drug use: No     PHQ 7/12/2019 7/30/2020 2/18/2021   PHQ-9 Total Score 7 4 3   Q9: Thoughts of better off dead/self-harm past 2 weeks Not at all Not at all Not at all     RAVEN-7 SCORE 12/4/2013 9/21/2018 7/12/2019   Total Score 7 - -   Total Score - - 7 (mild anxiety)   Total Score - 1 7     Last PHQ-9 2/18/2021   1.  Little interest or pleasure in doing things 0   2.  Feeling down, depressed, or hopeless 0   3.  Trouble falling or staying asleep, or sleeping too much 1   4.  Feeling tired or having little energy 1   5.  Poor appetite or overeating 0   6.  Feeling bad about yourself 1   7.  Trouble concentrating 0   8.  Moving slowly or restless 0   Q9: Thoughts of better off dead/self-harm past 2 weeks 0   PHQ-9 Total Score 3   Difficulty at work, home, or with people Somewhat difficult       Suicide Assessment Five-step Evaluation and Treatment (SAFE-T)    Today's PHQ-2 Score:    PHQ-2 ( 1999 Pfizer) 2/15/2021   Q1: Little interest or pleasure in doing things 1   Q2: Feeling down, depressed or hopeless 0   PHQ-2 Score 1   Q1: Little interest or pleasure in doing things Several days   Q2: Feeling down, depressed or hopeless Not at all   PHQ-2 Score 1       Abuse: Current or Past (Physical, Sexual or Emotional) - No  Do you feel safe in your environment? Yes    Have you ever done Advance Care Planning? (For example, a Health Directive, POLST, or a discussion with a medical provider or your loved ones about your wishes): Yes, patient states has an Advance Care Planning document and will bring a copy to the clinic.    Social History     Tobacco Use     Smoking status: Never Smoker     Smokeless tobacco: Never Used   Substance Use Topics     Alcohol use: Yes     If you drink alcohol do you typically have >3 drinks per day or >7 drinks per week? No    Alcohol Use 2/18/2021   Prescreen: >3 drinks/day or >7 drinks/week? -   Prescreen: >3 drinks/day or >7 drinks/week? No   No flowsheet data found.    Any new diagnosis of family breast, ovarian, or bowel cancer? No     Reviewed orders with patient.  Reviewed health maintenance and updated orders accordingly - Yes  BP Readings from Last 3 Encounters:   02/18/21 110/68   09/12/19 122/80   07/12/19 120/81    Wt Readings from Last 3 Encounters:   02/18/21 76.7 kg (169 lb)   09/12/19 70.3 kg (155 lb)   07/12/19 71.2 kg (157 lb)                  Current Outpatient Medications   Medication Sig Dispense Refill     sertraline (ZOLOFT) 50 MG tablet TAKE 1 TABLET BY MOUTH EVERY DAY 90 tablet 3     Allergies   Allergen Reactions     No Known Drug Allergies      Recent Labs   Lab Test 12/14/17  2231 10/13/17  2041 10/29/15  2253 10/22/15  1000   ALT 55* 13 16 13   CR 0.71  --   --  0.70   GFRESTIMATED >90  --   --  >90  Non  GFR Calc     GFRESTBLACK >90  --   --  >90  African American GFR Calc     POTASSIUM 3.7  --   --   --         Breast CA  Risk Screening:  No flowsheet data found.  No flowsheet data found.    Patient under 40 years of age: Routine Mammogram Screening not recommended.   Pertinent mammograms are reviewed under the imaging tab.    History of abnormal Pap smear:   NO - age 30-65 PAP every 5 years with negative HPV co-testing recommended  Last 3 Pap and HPV Results:   PAP / HPV Latest Ref Rng & Units 2015 2012 10/13/2005   PAP - NIL NIL NIL   HPV 16 DNA NEG Negative - -   HPV 18 DNA NEG Negative - -   OTHER HR HPV NEG Negative - -     PAP / HPV Latest Ref Rng & Units 2015 2012 10/13/2005   PAP - NIL NIL NIL   HPV 16 DNA NEG Negative - -   HPV 18 DNA NEG Negative - -   OTHER HR HPV NEG Negative - -     Reviewed and updated as needed this visit by clinical staff  Tobacco  Allergies  Meds   Med Hx  Surg Hx  Fam Hx  Soc Hx        Reviewed and updated as needed this visit by Provider       Surg Hx  Fam Hx         Past Medical History:   Diagnosis Date     Allergy, unspecified not elsewhere classified     using prn otc meds, w orks     Blood type O+ 10/17/2017     Depressive disorder 2006     Migraine, unspecified, without mention of intractable migraine without mention of status migrainosus     no issues any more     PIH (pregnancy induced hypertension), third trimester without severe features       Past Surgical History:   Procedure Laterality Date     C ORAL SURGERY PROCEDURE      wisdom teeth removal     OB History    Para Term  AB Living   2 2 2 0 0 2   SAB TAB Ectopic Multiple Live Births   0 0 0 0 2      # Outcome Date GA Lbr Marcos/2nd Weight Sex Delivery Anes PTL Lv   2 Term 10/13/17 40w4d 06:49 / 00:02 3.997 kg (8 lb 13 oz) M Vag-Spont None N FAM      Complications: Prolonged PROM (>18 hours), GBS      Name: Arash      Apgar1: 8  Apgar5: 9   1 Term 10/30/15 40w4d 07:35 / 04:48 3.685 kg (8 lb 2 oz) F Vag-Spont Local  FAM      Name: Merissa Napier      Apgar1: 9  Apgar5: 9      Obstetric  Comments   10/13/17 Patient here with PROM 10/12/17 @ 0400 , received 4 doses of miso, and then augmentation/induction of labor with rupture of membranes.  Pitocin started at 0800 4/13/17.  Labored with pitocin all day making slow progress.  2251 called out with rupture of bulging forbag and quickly progressed to pushing.  Delivered standing at side of bed per patient preference through 2 contractions.   and spouse Cullen present with great support.  Perineum sustained second degree lac that was repaired.  Infant directly skin to skin, mother bonding well.        Review of Systems   Constitutional: Negative for chills and fever.   HENT: Negative for congestion, ear pain, hearing loss and sore throat.    Eyes: Negative for pain and visual disturbance.   Respiratory: Negative for cough and shortness of breath.    Cardiovascular: Negative for chest pain, palpitations and peripheral edema.   Gastrointestinal: Negative for abdominal pain, constipation, diarrhea, heartburn, hematochezia and nausea.   Breasts:  Negative for tenderness, breast mass and discharge.   Genitourinary: Negative for dysuria, frequency, genital sores, hematuria, pelvic pain, urgency, vaginal bleeding and vaginal discharge.   Musculoskeletal: Negative for arthralgias, joint swelling and myalgias.   Skin: Negative for rash.   Neurological: Negative for dizziness, weakness, headaches and paresthesias.   Psychiatric/Behavioral: Negative for mood changes. The patient is not nervous/anxious.      Family History   Problem Relation Age of Onset     Hypertension Mother      Tumor Mother 65        in stomach     Uterine Cancer Mother      Hypertension Father      Allergies Father         has seasonal allergies     Diabetes Maternal Uncle      Breast Cancer Paternal Grandmother         early 60's     Lung Cancer Paternal Grandfather      Cancer - colorectal Other         paternal aunt, dx at 54     Cancer Other         cervical cancer-treated in 2006     "     OBJECTIVE:   /68 (BP Location: Right arm, Patient Position: Sitting, Cuff Size: Adult Regular)   Pulse 55   Temp 97.6  F (36.4  C) (Temporal)   Resp 16   Ht 1.689 m (5' 6.5\")   Wt 76.7 kg (169 lb)   LMP 02/01/2021 (Approximate)   SpO2 99%   BMI 26.87 kg/m    Physical Exam  GENERAL: healthy, alert and no distress  EYES: Eyes grossly normal to inspection, PERRL and conjunctivae and sclerae normal  HENT: ear canals and TM's normal, nose and mouth without ulcers or lesions  NECK: no adenopathy, no asymmetry, masses, or scars and thyroid normal to palpation  RESP: lungs clear to auscultation - no rales, rhonchi or wheezes  BREAST: normal without masses, tenderness or nipple discharge and no palpable axillary masses or adenopathy  CV: regular rate and rhythm, normal S1 S2, no S3 or S4, no murmur, click or rub, no peripheral edema and peripheral pulses strong  ABDOMEN: soft, nontender, no hepatosplenomegaly, no masses and bowel sounds normal   (female): normal female external genitalia, normal urethral meatus, vaginal mucosa pink, moist, well rugated, and normal cervix/adnexa/uterus without masses or discharge  MS: no gross musculoskeletal defects noted, no edema  SKIN: no suspicious lesions or rashes  NEURO: Normal strength and tone, mentation intact and speech normal  PSYCH: mentation appears normal, affect normal/bright  LYMPH: normal ant/post cervical, supraclavicular nodes    Diagnostic Test Results:  Labs reviewed in Epic    ASSESSMENT/PLAN:   1. Routine general medical examination at a health care facility  routine  - Pap imaged thin layer screen with HPV - recommended age 30 - 65  - HPV High Risk Types DNA Cervical    2. Screening for cervical cancer  - Pap imaged thin layer screen with HPV - recommended age 30 - 65  - HPV High Risk Types DNA Cervical    3. Presence of intrauterine contraceptive device  Placed 2017    Patient has been advised of split billing requirements and indicates " "understanding: Yes  COUNSELING:  Reviewed preventive health counseling, as reflected in patient instructions    Estimated body mass index is 26.87 kg/m  as calculated from the following:    Height as of this encounter: 1.689 m (5' 6.5\").    Weight as of this encounter: 76.7 kg (169 lb).    She reports that she has never smoked. She has never used smokeless tobacco.    Counseling Resources:  ATP IV Guidelines  Pooled Cohorts Equation Calculator  Breast Cancer Risk Calculator  BRCA-Related Cancer Risk Assessment: FHS-7 Tool  FRAX Risk Assessment  ICSI Preventive Guidelines  Dietary Guidelines for Americans, 2010  USDA's MyPlate  ASA Prophylaxis  Lung CA Screening    Stephany Noriega MD  [facility]  "

## 2021-02-22 LAB
COPATH REPORT: NORMAL
PAP: NORMAL

## 2021-02-23 LAB
FINAL DIAGNOSIS: NORMAL
HPV HR 12 DNA CVX QL NAA+PROBE: NEGATIVE
HPV16 DNA SPEC QL NAA+PROBE: NEGATIVE
HPV18 DNA SPEC QL NAA+PROBE: NEGATIVE
SPECIMEN DESCRIPTION: NORMAL
SPECIMEN SOURCE CVX/VAG CYTO: NORMAL

## 2021-04-25 ENCOUNTER — MYC MEDICAL ADVICE (OUTPATIENT)
Dept: FAMILY MEDICINE | Facility: CLINIC | Age: 41
End: 2021-04-25

## 2021-04-25 DIAGNOSIS — M53.3 PAIN IN THE COCCYX: Primary | ICD-10-CM

## 2021-04-26 NOTE — TELEPHONE ENCOUNTER
Writer responded via [employer].    DAQUAN SantiagoN, RN  [facility]

## 2021-04-27 NOTE — TELEPHONE ENCOUNTER
Writer responded via [employer].    DAQUAN SantiagoN, RN  [facility]

## 2021-04-27 NOTE — TELEPHONE ENCOUNTER
Dr. Noriega-Please review patient's messages.  Do you recommend Orthopedic follow up?  It is writer's understanding even if coccyx is broken, management is typically conservative.    Writer would also recommend patient use donut pillow while sitting.    Thank you!  DAQUAN SantiagoN, RN  [facility]

## 2021-04-27 NOTE — TELEPHONE ENCOUNTER
I agree with assessment and plan below, thanks! Tailbone fractures are painful and there isn't much to do except use a doughnut as you mentioned, nsaids and ice as she's doing.  She can come in but there's not likely to be any change in recommendations. Kalen for being a fun mom, though!  :) :(  Stephany Noriega MD  4/27/2021

## 2021-04-27 NOTE — TELEPHONE ENCOUNTER
Noted.    No further action needed from triage.    DAQUAN SantiagoN, RN  [facility]

## 2021-09-05 ENCOUNTER — HEALTH MAINTENANCE LETTER (OUTPATIENT)
Age: 41
End: 2021-09-05

## 2021-10-24 DIAGNOSIS — F32.1 MAJOR DEPRESSIVE DISORDER, SINGLE EPISODE, MODERATE (H): ICD-10-CM

## 2021-10-26 NOTE — TELEPHONE ENCOUNTER
Routing refill request to provider for review/approval because:  Patient needs to be seen because:  Needs mental health f/u    Team Coordinators: Please contact patient to set up mental health follow up (can be virtual) and is due for annual physical in February for any further refills.     DAQUAN GustafsonN RN  [facility]

## 2021-11-01 ENCOUNTER — MYC MEDICAL ADVICE (OUTPATIENT)
Dept: FAMILY MEDICINE | Facility: CLINIC | Age: 41
End: 2021-11-01

## 2021-11-02 NOTE — TELEPHONE ENCOUNTER
Noted.    No further action needed from triage.    DAQUAN SantiagoN, RN  [facility]

## 2022-01-23 DIAGNOSIS — F32.1 MAJOR DEPRESSIVE DISORDER, SINGLE EPISODE, MODERATE (H): ICD-10-CM

## 2022-01-25 NOTE — TELEPHONE ENCOUNTER
Routing refill request to provider for review/approval because:   PHQ-9 score less than 5 in past 6 months    Recent (6 mo) or future (30 days) visit within the authorizing provider's specialty     PHQ 7/12/2019 7/30/2020 2/18/2021   PHQ-9 Total Score 7 4 3   Q9: Thoughts of better off dead/self-harm past 2 weeks Not at all Not at all Not at all     Last Written Prescription Date:  10/26/21  Last Fill Quantity: 90,  # refills: 0   Last office visit: 2/18/2021 with prescribing provider:  Hari   Future Office Visit:      Scheduling: please reach out to pt for annual visit    Giulia Wilkins RN  [facility]

## 2022-04-17 ENCOUNTER — HEALTH MAINTENANCE LETTER (OUTPATIENT)
Age: 42
End: 2022-04-17

## 2022-04-19 ENCOUNTER — MYC REFILL (OUTPATIENT)
Dept: FAMILY MEDICINE | Facility: CLINIC | Age: 42
End: 2022-04-19
Payer: COMMERCIAL

## 2022-04-19 DIAGNOSIS — F32.1 MAJOR DEPRESSIVE DISORDER, SINGLE EPISODE, MODERATE (H): ICD-10-CM

## 2022-04-20 NOTE — TELEPHONE ENCOUNTER
Routing refill request to provider for review/approval because:   PHQ-9 score less than 5 in past 6 months    Recent (6 mo) or future (30 days) visit within the authorizing provider's specialty     PHQ-9 score:    PHQ 2/18/2021   PHQ-9 Total Score 3   Q9: Thoughts of better off dead/self-harm past 2 weeks Not at all     Patient comment: For some reason I had in my head that my appt with Dr. Noriega was sooner and I just realized that I'll run out of Sertraline well before my May 19 visit. Could I get a 30 tablet refill?    Last visit 2/18/21, upcoming visit 5/19.    Rekha Magallon RN  [facility]

## 2022-04-21 DIAGNOSIS — F32.1 MAJOR DEPRESSIVE DISORDER, SINGLE EPISODE, MODERATE (H): ICD-10-CM

## 2022-05-18 DIAGNOSIS — F32.1 MAJOR DEPRESSIVE DISORDER, SINGLE EPISODE, MODERATE (H): ICD-10-CM

## 2022-05-19 ENCOUNTER — OFFICE VISIT (OUTPATIENT)
Dept: FAMILY MEDICINE | Facility: CLINIC | Age: 42
End: 2022-05-19
Payer: COMMERCIAL

## 2022-05-19 VITALS
DIASTOLIC BLOOD PRESSURE: 78 MMHG | TEMPERATURE: 97.5 F | WEIGHT: 170 LBS | OXYGEN SATURATION: 100 % | BODY MASS INDEX: 26.68 KG/M2 | SYSTOLIC BLOOD PRESSURE: 126 MMHG | HEIGHT: 67 IN | HEART RATE: 63 BPM

## 2022-05-19 DIAGNOSIS — Z00.00 ROUTINE GENERAL MEDICAL EXAMINATION AT A HEALTH CARE FACILITY: Primary | ICD-10-CM

## 2022-05-19 DIAGNOSIS — F32.1 MAJOR DEPRESSIVE DISORDER, SINGLE EPISODE, MODERATE (H): ICD-10-CM

## 2022-05-19 PROCEDURE — 99396 PREV VISIT EST AGE 40-64: CPT | Performed by: FAMILY MEDICINE

## 2022-05-19 ASSESSMENT — ENCOUNTER SYMPTOMS
DIARRHEA: 0
EYE PAIN: 0
JOINT SWELLING: 0
HEARTBURN: 0
FEVER: 0
MYALGIAS: 0
WEAKNESS: 0
SORE THROAT: 0
DIZZINESS: 0
HEMATURIA: 0
NERVOUS/ANXIOUS: 0
HEMATOCHEZIA: 0
COUGH: 0
ABDOMINAL PAIN: 0
CONSTIPATION: 0
NAUSEA: 0
HEADACHES: 0
PALPITATIONS: 0
DYSURIA: 0
SHORTNESS OF BREATH: 0
ARTHRALGIAS: 0
PARESTHESIAS: 0
CHILLS: 0

## 2022-05-19 ASSESSMENT — PATIENT HEALTH QUESTIONNAIRE - PHQ9
10. IF YOU CHECKED OFF ANY PROBLEMS, HOW DIFFICULT HAVE THESE PROBLEMS MADE IT FOR YOU TO DO YOUR WORK, TAKE CARE OF THINGS AT HOME, OR GET ALONG WITH OTHER PEOPLE: SOMEWHAT DIFFICULT
SUM OF ALL RESPONSES TO PHQ QUESTIONS 1-9: 4
SUM OF ALL RESPONSES TO PHQ QUESTIONS 1-9: 4

## 2022-05-19 NOTE — PROGRESS NOTES
SUBJECTIVE:   CC: Jasmin Benavidez is an 41 year old woman who presents for preventive health visit.       Patient has been advised of split billing requirements and indicates understanding: Yes  Healthy Habits:     Getting at least 3 servings of Calcium per day:  Yes    Bi-annual eye exam:  NO    Dental care twice a year:  Yes    Sleep apnea or symptoms of sleep apnea:  None    Diet:  Regular (no restrictions)    Frequency of exercise:  1 day/week    Duration of exercise:  Less than 15 minutes    Taking medications regularly:  Yes    Medication side effects:  None    PHQ-2 Total Score: 2    Additional concerns today:  No          Depression Followup    How are you doing with your depression since your last visit? No change    Are you having other symptoms that might be associated with depression? No    Have you had a significant life event?  No     Are you feeling anxious or having panic attacks?   No    Do you have any concerns with your use of alcohol or other drugs? No    Social History     Tobacco Use     Smoking status: Never Smoker     Smokeless tobacco: Never Used   Substance Use Topics     Alcohol use: Yes     Drug use: No     PHQ 7/30/2020 2/18/2021 5/19/2022   PHQ-9 Total Score 4 3 4   Q9: Thoughts of better off dead/self-harm past 2 weeks Not at all Not at all Not at all     RAVEN-7 SCORE 12/4/2013 9/21/2018 7/12/2019   Total Score 7 - -   Total Score - - 7 (mild anxiety)   Total Score - 1 7       Suicide Assessment Five-step Evaluation and Treatment (SAFE-T)      Today's PHQ-2 Score:   PHQ-2 ( 1999 Pfizer) 5/19/2022   Q1: Little interest or pleasure in doing things 1   Q2: Feeling down, depressed or hopeless 1   PHQ-2 Score 2   PHQ-2 Total Score (12-17 Years)- Positive if 3 or more points; Administer PHQ-A if positive -   Q1: Little interest or pleasure in doing things Several days   Q2: Feeling down, depressed or hopeless Several days   PHQ-2 Score 2       Abuse: Current or Past (Physical, Sexual or  Emotional) - No  Do you feel safe in your environment? Yes        Social History     Tobacco Use     Smoking status: Never Smoker     Smokeless tobacco: Never Used   Substance Use Topics     Alcohol use: Yes         Alcohol Use 5/19/2022   Prescreen: >3 drinks/day or >7 drinks/week? No   Prescreen: >3 drinks/day or >7 drinks/week? -       Reviewed orders with patient.  Reviewed health maintenance and updated orders accordingly - Yes  BP Readings from Last 3 Encounters:   05/19/22 126/78   02/18/21 110/68   09/12/19 122/80    Wt Readings from Last 3 Encounters:   05/19/22 77.1 kg (170 lb)   02/18/21 76.7 kg (169 lb)   09/12/19 70.3 kg (155 lb)                  Patient Active Problem List   Diagnosis     Moderate major depression (HCC)     Blood type O+     Presence of intrauterine contraceptive device     Major depressive disorder, single episode, moderate (H)     Past Surgical History:   Procedure Laterality Date     [facility] ORAL SURGERY PROCEDURE  1999    wisdom teeth removal       Social History     Tobacco Use     Smoking status: Never Smoker     Smokeless tobacco: Never Used   Substance Use Topics     Alcohol use: Yes     Family History   Problem Relation Age of Onset     Hypertension Mother      Tumor Mother 65        in stomach     Uterine Cancer Mother      Hypertension Father      Allergies Father         has seasonal allergies     Diabetes Maternal Uncle      Breast Cancer Paternal Grandmother         early 60's     Lung Cancer Paternal Grandfather      Cancer - colorectal Other         paternal aunt, dx at 54     Cancer Other         cervical cancer-treated in 2006         Current Outpatient Medications   Medication Sig Dispense Refill     sertraline (ZOLOFT) 50 MG tablet Take 1 tablet (50 mg) by mouth daily 90 tablet 3     Allergies   Allergen Reactions     No Known Drug Allergies      Recent Labs   Lab Test 12/14/17  2231 10/13/17  2041 10/29/15  2253 10/22/15  1000   ALT 55* 13 16 13   CR 0.71  --   --   0.70   GFRESTIMATED >90  --   --  >90  Non  GFR Calc     GFRESTBLACK >90  --   --  >90  African American GFR Calc     POTASSIUM 3.7  --   --   --         Breast Cancer Screening:    FHS-7:   Breast CA Risk Assessment (FHS-7) 2022   Did any of your first-degree relatives have breast or ovarian cancer? No   Did any of your relatives have bilateral breast cancer? No   Did any man in your family have breast cancer? No   Did any woman in your family have breast and ovarian cancer? Yes   Did any woman in your family have breast cancer before age 50 y? No   Do you have 2 or more relatives with breast and/or ovarian cancer? No   Do you have 2 or more relatives with breast and/or bowel cancer? No     Pertinent mammograms are reviewed under the imaging tab.    History of abnormal Pap smear: NO - age 30-65 PAP every 5 years with negative HPV co-testing recommended  PAP / HPV Latest Ref Rng & Units 2021   PAP (Historical) - NIL NIL NIL   HPV16 NEG:Negative Negative Negative -   HPV18 NEG:Negative Negative Negative -   HRHPV NEG:Negative Negative Negative -     Reviewed and updated as needed this visit by clinical staff   Tobacco  Allergies  Meds      Soc Hx          Reviewed and updated as needed this visit by Provider                   Past Medical History:   Diagnosis Date     Allergy, unspecified not elsewhere classified     using prn otc meds, w orks     Blood type O+ 10/17/2017     Depressive disorder 2006     Migraine, unspecified, without mention of intractable migraine without mention of status migrainosus     no issues any more     PIH (pregnancy induced hypertension), third trimester without severe features       Past Surgical History:   Procedure Laterality Date     [facility] ORAL SURGERY PROCEDURE      wisdom teeth removal     OB History    Para Term  AB Living   2 2 2 0 0 2   SAB IAB Ectopic Multiple Live Births   0 0 0 0 2      # Outcome Date GA Lbr  "Marcos/2nd Weight Sex Delivery Anes PTL Lv   2 Term 10/13/17 40w4d 06:49 / 00:02 3.997 kg (8 lb 13 oz) M Vag-Spont None N FAM      Complications: Prolonged PROM (>18 hours), GBS      Name: Arash      Apgar1: 8  Apgar5: 9   1 Term 10/30/15 40w4d 07:35 / 04:48 3.685 kg (8 lb 2 oz) F Vag-Spont Local  FAM      Name: Merissa Napier      Apgar1: 9  Apgar5: 9      Obstetric Comments   10/13/17 Patient here with PROM 10/12/17 @ 0400 , received 4 doses of miso, and then augmentation/induction of labor with rupture of membranes.  Pitocin started at 0800 4/13/17.  Labored with pitocin all day making slow progress.  2251 called out with rupture of bulging forbag and quickly progressed to pushing.  Delivered standing at side of bed per patient preference through 2 contractions.   and spouse Cullen present with great support.  Perineum sustained second degree lac that was repaired.  Infant directly skin to skin, mother bonding well.      Review of Systems  CONSTITUTIONAL: NEGATIVE for fever, chills, change in weight  INTEGUMENTARU/SKIN: NEGATIVE for worrisome rashes, moles or lesions  EYES: NEGATIVE for vision changes or irritation  ENT: NEGATIVE for ear, mouth and throat problems  RESP: NEGATIVE for significant cough or SOB  BREAST: NEGATIVE for masses, tenderness or discharge  CV: NEGATIVE for chest pain, palpitations or peripheral edema  GI: NEGATIVE for nausea, abdominal pain, heartburn, or change in bowel habits  : NEGATIVE for unusual urinary or vaginal symptoms. Periods are regular.  MUSCULOSKELETAL: NEGATIVE for significant arthralgias or myalgia  NEURO: NEGATIVE for weakness, dizziness or paresthesias  PSYCHIATRIC: NEGATIVE for changes in mood or affect     OBJECTIVE:   /78 (Cuff Size: Adult Regular)   Pulse 63   Temp 97.5  F (36.4  C) (Temporal)   Ht 1.689 m (5' 6.5\")   Wt 77.1 kg (170 lb)   LMP  (LMP Unknown)   SpO2 100%   BMI 27.03 kg/m    Physical Exam  GENERAL: healthy, alert and no distress  EYES: " "Eyes grossly normal to inspection, PERRL and conjunctivae and sclerae normal  HENT: ear canals and TM's normal, nose and mouth without ulcers or lesions  NECK: no adenopathy, no asymmetry, masses, or scars and thyroid normal to palpation  RESP: lungs clear to auscultation - no rales, rhonchi or wheezes  CV: regular rate and rhythm, normal S1 S2, no S3 or S4, no murmur, click or rub, no peripheral edema and peripheral pulses strong  ABDOMEN: soft, nontender, no hepatosplenomegaly, no masses and bowel sounds normal  MS: no gross musculoskeletal defects noted, no edema  SKIN: no suspicious lesions or rashes  NEURO: Normal strength and tone, mentation intact and speech normal  PSYCH: mentation appears normal, affect normal/bright    ASSESSMENT/PLAN:   (Z00.00) Routine general medical examination at a health care facility  (primary encounter diagnosis)  routine    (F32.1) Major depressive disorder, single episode, moderate (H)  Comment: At goal  The current medical regimen is effective;  continue present plan and medications.    Plan: sertraline (ZOLOFT) 50 MG tablet        50 mg daily      COUNSELING:  Reviewed preventive health counseling, as reflected in patient instructions    Estimated body mass index is 27.03 kg/m  as calculated from the following:    Height as of this encounter: 1.689 m (5' 6.5\").    Weight as of this encounter: 77.1 kg (170 lb).      She reports that she has never smoked. She has never used smokeless tobacco.      Counseling Resources:  ATP IV Guidelines  Pooled Cohorts Equation Calculator  Breast Cancer Risk Calculator  BRCA-Related Cancer Risk Assessment: FHS-7 Tool  FRAX Risk Assessment  ICSI Preventive Guidelines  Dietary Guidelines for Americans, 2010  USDA's MyPlate  ASA Prophylaxis  Lung CA Screening    Stephany Noriega MD  [facility]  Answers for HPI/ROS submitted by the patient on 5/19/2022  If you checked off any problems, how difficult have these " problems made it for you to do your work, take care of things at home, or get along with other people?: Somewhat difficult  PHQ9 TOTAL SCORE: 4

## 2022-05-19 NOTE — PROGRESS NOTES
SUBJECTIVE:   CC: Jasmin Benavidez is an 41 year old woman who presents for preventive health visit.         {Healthy Habits:     Getting at least 3 servings of Calcium per day:  Yes    Bi-annual eye exam:  NO    Dental care twice a year:  Yes    Sleep apnea or symptoms of sleep apnea:  None    Diet:  Regular (no restrictions)    Frequency of exercise:  1 day/week    Duration of exercise:  Less than 15 minutes    Taking medications regularly:  Yes    Medication side effects:  None    PHQ-2 Total Score: 2    Additional concerns today:  No          Today's PHQ-2 Score:   PHQ-2 ( 1999 Pfizer) 5/19/2022   Q1: Little interest or pleasure in doing things 1   Q2: Feeling down, depressed or hopeless 1   PHQ-2 Score 2   PHQ-2 Total Score (12-17 Years)- Positive if 3 or more points; Administer PHQ-A if positive -   Q1: Little interest or pleasure in doing things Several days   Q2: Feeling down, depressed or hopeless Several days   PHQ-2 Score 2       Abuse: Current or Past (Physical, Sexual or Emotional) - { :665624}  Do you feel safe in your environment? { :553138}        Social History     Tobacco Use     Smoking status: Never Smoker     Smokeless tobacco: Never Used   Substance Use Topics     Alcohol use: Yes     {Rooming Staff- Complete this question if Prescreen response is not shown below for today's visit. If you drink alcohol do you typically have >3 drinks per day or >7 drinks per week? (Optional):959415}    Alcohol Use 5/19/2022   Prescreen: >3 drinks/day or >7 drinks/week? No   Prescreen: >3 drinks/day or >7 drinks/week? -   {add AUDIT responses (Optional) (A score of 7 for adult men is an indication of hazardous drinking; a score of 8 or more is an indication of an alcohol use disorder.  A score of 7 or more for adult women is an indication of hazardous drinking or an alchohol use disorder):481610}    Reviewed orders with patient.  Reviewed health maintenance and updated orders accordingly - {  ":115507::\"Yes\"}  {Chronicprobdata (optional):023059}    Breast Cancer Screening:    FHS-7:   Breast CA Risk Assessment (FHS-7) 5/19/2022   Did any of your first-degree relatives have breast or ovarian cancer? No   Did any of your relatives have bilateral breast cancer? No   Did any man in your family have breast cancer? No   Did any woman in your family have breast and ovarian cancer? Yes   Did any woman in your family have breast cancer before age 50 y? No   Do you have 2 or more relatives with breast and/or ovarian cancer? No   Do you have 2 or more relatives with breast and/or bowel cancer? No     {If any of the questions to the BCRA (FHS-7) are answered yes, consider ordering referral for genetic counseling (Optional) :567763::\"click delete button to remove this line now\"}  {AMB Mammogram Decision Support (Optional) :968906}  Pertinent mammograms are reviewed under the imaging tab.    History of abnormal Pap smear: { :010740}  PAP / HPV Latest Ref Rng & Units 2/18/2021 12/1/2015 4/19/2012   PAP (Historical) - NIL NIL NIL   HPV16 NEG:Negative Negative Negative -   HPV18 NEG:Negative Negative Negative -   HRHPV NEG:Negative Negative Negative -     Reviewed and updated as needed this visit by clinical staff                    Reviewed and updated as needed this visit by Provider                   {HISTORY OPTIONS (Optional):196496}    Review of Systems   Constitutional: Negative for chills and fever.   HENT: Negative for congestion, ear pain, hearing loss and sore throat.    Eyes: Negative for pain and visual disturbance.   Respiratory: Negative for cough and shortness of breath.    Cardiovascular: Negative for chest pain, palpitations and peripheral edema.   Gastrointestinal: Negative for abdominal pain, constipation, diarrhea, heartburn, hematochezia and nausea.   Genitourinary: Negative for dysuria, genital sores, hematuria and urgency.   Musculoskeletal: Negative for arthralgias, joint swelling and myalgias. " "  Skin: Negative for rash.   Neurological: Negative for dizziness, weakness, headaches and paresthesias.   Psychiatric/Behavioral: Negative for mood changes. The patient is not nervous/anxious.      {FEMALE ROS (Optional):033249}     OBJECTIVE:   There were no vitals taken for this visit.  Physical Exam  {Exam Choices (Optional):749356}    {Diagnostic Test Results (Optional):108846::\"Diagnostic Test Results:\",\"Labs reviewed in Epic\"}    ASSESSMENT/PLAN:   {Diag Picklist:097013}    {Patient advised of split billing (Optional):473255}    COUNSELING:  {FEMALE COUNSELING MESSAGES:678914::\"Reviewed preventive health counseling, as reflected in patient instructions\"}    Estimated body mass index is 26.87 kg/m  as calculated from the following:    Height as of 2/18/21: 1.689 m (5' 6.5\").    Weight as of 2/18/21: 76.7 kg (169 lb).    {Weight Management Plan (ACO) Complete if BMI is abnormal-  Ages 18-64  BMI >24.9.  Age 65+ with BMI <23 or >30 (Optional):003579}    She reports that she has never smoked. She has never used smokeless tobacco.      Counseling Resources:  ATP IV Guidelines  Pooled Cohorts Equation Calculator  Breast Cancer Risk Calculator  BRCA-Related Cancer Risk Assessment: FHS-7 Tool  FRAX Risk Assessment  ICSI Preventive Guidelines  Dietary Guidelines for Americans, 2010  [employer]'s MyPlate  ASA Prophylaxis  Lung CA Screening    Stephany Noriega MD  [facility]  Answers for HPI/ROS submitted by the patient on 5/19/2022  If you checked off any problems, how difficult have these problems made it for you to do your work, take care of things at home, or get along with other people?: Somewhat difficult  PHQ9 TOTAL SCORE: 4      "

## 2022-10-23 ENCOUNTER — HEALTH MAINTENANCE LETTER (OUTPATIENT)
Age: 42
End: 2022-10-23

## 2023-04-20 ENCOUNTER — PATIENT OUTREACH (OUTPATIENT)
Dept: CARE COORDINATION | Facility: CLINIC | Age: 43
End: 2023-04-20
Payer: COMMERCIAL

## 2023-05-04 ENCOUNTER — PATIENT OUTREACH (OUTPATIENT)
Dept: CARE COORDINATION | Facility: CLINIC | Age: 43
End: 2023-05-04
Payer: COMMERCIAL

## 2023-08-27 ENCOUNTER — HEALTH MAINTENANCE LETTER (OUTPATIENT)
Age: 43
End: 2023-08-27

## 2023-09-09 DIAGNOSIS — F32.1 MAJOR DEPRESSIVE DISORDER, SINGLE EPISODE, MODERATE (H): ICD-10-CM

## 2023-11-07 ENCOUNTER — MYC MEDICAL ADVICE (OUTPATIENT)
Dept: FAMILY MEDICINE | Facility: CLINIC | Age: 43
End: 2023-11-07
Payer: COMMERCIAL

## 2023-11-10 ENCOUNTER — E-VISIT (OUTPATIENT)
Dept: URGENT CARE | Facility: CLINIC | Age: 43
End: 2023-11-10
Payer: COMMERCIAL

## 2023-11-10 ENCOUNTER — OFFICE VISIT (OUTPATIENT)
Dept: DERMATOLOGY | Facility: CLINIC | Age: 43
End: 2023-11-10
Payer: COMMERCIAL

## 2023-11-10 DIAGNOSIS — R21 RASH: Primary | ICD-10-CM

## 2023-11-10 DIAGNOSIS — L71.9 ROSACEA: Primary | ICD-10-CM

## 2023-11-10 DIAGNOSIS — L30.9 DERMATITIS: Primary | ICD-10-CM

## 2023-11-10 PROCEDURE — 99204 OFFICE O/P NEW MOD 45 MIN: CPT | Performed by: STUDENT IN AN ORGANIZED HEALTH CARE EDUCATION/TRAINING PROGRAM

## 2023-11-10 PROCEDURE — 99207 PR NON-BILLABLE SERV PER CHARTING: CPT | Performed by: NURSE PRACTITIONER

## 2023-11-10 RX ORDER — DOXYCYCLINE HYCLATE 100 MG
100 TABLET ORAL 2 TIMES DAILY
Qty: 60 TABLET | Refills: 1 | Status: SHIPPED | OUTPATIENT
Start: 2023-11-10

## 2023-11-10 RX ORDER — METRONIDAZOLE 7.5 MG/G
GEL TOPICAL 2 TIMES DAILY
Qty: 45 G | Refills: 4 | Status: SHIPPED | OUTPATIENT
Start: 2023-11-10 | End: 2024-02-09

## 2023-11-10 ASSESSMENT — PAIN SCALES - GENERAL: PAINLEVEL: NO PAIN (0)

## 2023-11-10 NOTE — PATIENT INSTRUCTIONS
Take doxycycline 100mg twice daily  - avoid sun, or wear sunscreen and sun protective clothing when outside, this medicine will make you more likely to get a sunburn while taking  - take with food to avoid nausea  - take with a glass of water and remain upright for 30 minutes after to avoid esophageal irritation    Apply azelaic acid twice daily    Wear sunscreen at least SPF 30 EVERY DAY

## 2023-11-10 NOTE — LETTER
11/10/2023       RE: Jasmin Benavidez  [address] [facility] [address]     Dear Colleague,    Thank you for referring your patient, Jasmin Benavidez, to the [facility] DERMATOLOGY [facility] [place] at [facility]. Please see a copy of my visit note below.    [facility] Dermatology Note    Encounter Date: Nov 10, 2023    Dermatology Problem List:      __________________________________    Impression/Plan:  Jasmin was seen today for derm problem.    Diagnoses and all orders for this visit:    Rosacea (Chronic flaring)  -     doxycycline hyclate (VIBRA-TABS) 100 MG tablet; Take 1 tablet (100 mg) by mouth 2 times daily  -     metroNIDAZOLE (METROGEL) 0.75 % external gel; Apply topically 2 times daily  -Papulopustular greater then erythematous telangiectatic type  - Emphasized the importance of daily sunscreen at least SPF 30  - Start doxycycline 100 mg twice daily  - Start metronidazole gel twice daily      Other orders  -     Adult Dermatology Clinic Follow-Up Order (Blank); Future        Follow-up in 3 mo .       Staff Involved:  Staff Only    Edward Zurita MD   of Dermatology  Department of Dermatology  [facility] [employer] [facility]      CC:   Chief Complaint   Patient presents with    Derm Problem     Face.  Started a couple months ago, getting worse.  Redness, dry, patchy, warm.  Feels like a pimple.  [facility] gave her hydrocortisone ointment, made it worse.        History of Present Illness:  Ms. Jasmin Benavidez is a 43 year old female who presents as a new patient.    Last seen may 2023 for rash on face. Was given protopic and doxy. Pt states that was related to hair dye.     Recently noticed rash on b/l cheeks.  Has also had some prickling and burning on bilateral cheeks accompanied by flushing occasionally.  Went to [facility] and was given hydrocortisone which did not help and maybe made things  worse    Labs:      Physical exam:  Vitals: There were no vitals taken for this visit.  GEN: well developed, well-nourished, in no acute distress, in a pleasant mood.     SKIN: Dewitt phototype 1  - Sun-exposed skin, which includes the head/face, neck, both arms, digits, and/or nails was examined.   - erythema and telangiectasia involving forehead, cheeks, and chin as well as small pink papules and micropustules on bilateral cheeks  - No other lesions of concern on areas examined.     Past Medical History:   Past Medical History:   Diagnosis Date    Allergy, unspecified not elsewhere classified     using prn otc meds, w orks    Blood type O+ 10/17/2017    Depressive disorder 2006    Migraine, unspecified, without mention of intractable migraine without mention of status migrainosus     no issues any more    PIH (pregnancy induced hypertension), third trimester without severe features      Past Surgical History:   Procedure Laterality Date    [facility] ORAL SURGERY PROCEDURE  1999    wisdom teeth removal       Social History:   reports that she has never smoked. She has never used smokeless tobacco. She reports current alcohol use. She reports that she does not use drugs.    Family History:  Family History   Problem Relation Age of Onset    Hypertension Mother     Tumor Mother 65        in stomach    Uterine Cancer Mother     Hypertension Father     Allergies Father         has seasonal allergies    Skin Cancer Maternal Grandfather     Breast Cancer Paternal Grandmother         early 60's    Lung Cancer Paternal Grandfather     Diabetes Maternal Uncle     Cancer - colorectal Other         paternal aunt, dx at 54    Cancer Other         cervical cancer-treated in 2006    Melanoma No family hx of        Medications:  Current Outpatient Medications   Medication Sig Dispense Refill    doxycycline hyclate (VIBRA-TABS) 100 MG tablet Take 1 tablet (100 mg) by mouth 2 times daily 60 tablet 1    metroNIDAZOLE (METROGEL) 0.75 %  external gel Apply topically 2 times daily 45 g 4    sertraline (ZOLOFT) 50 MG tablet Take 1 tablet (50 mg) by mouth daily 90 tablet 0     Allergies   Allergen Reactions    No Known Drug Allergy                Again, thank you for allowing me to participate in the care of your patient.      Sincerely,    Edward Zurita MD

## 2023-11-10 NOTE — PROGRESS NOTES
[facility] Dermatology Note    Encounter Date: Nov 10, 2023    Dermatology Problem List:      __________________________________    Impression/Plan:  Jasmin was seen today for derm problem.    Diagnoses and all orders for this visit:    Rosacea (Chronic flaring)  -     doxycycline hyclate (VIBRA-TABS) 100 MG tablet; Take 1 tablet (100 mg) by mouth 2 times daily  -     metroNIDAZOLE (METROGEL) 0.75 % external gel; Apply topically 2 times daily  -Papulopustular greater then erythematous telangiectatic type  - Emphasized the importance of daily sunscreen at least SPF 30  - Start doxycycline 100 mg twice daily  - Start metronidazole gel twice daily      Other orders  -     Adult Dermatology Clinic Follow-Up Order (Blank); Future        Follow-up in 3 mo .       Staff Involved:  Staff Only    Edward Zurita MD   of Dermatology  Department of Dermatology  [facility]      CC:   Chief Complaint   Patient presents with    Derm Problem     Face.  Started a couple months ago, getting worse.  Redness, dry, patchy, warm.  Feels like a pimple.  [facility] gave her hydrocortisone ointment, made it worse.        History of Present Illness:  Ms. Jasmin Benavidez is a 43 year old female who presents as a new patient.    Last seen may 2023 for rash on face. Was given protopic and doxy. Pt states that was related to hair dye.     Recently noticed rash on b/l cheeks.  Has also had some prickling and burning on bilateral cheeks accompanied by flushing occasionally.  Went to [facility] and was given hydrocortisone which did not help and maybe made things worse    Labs:      Physical exam:  Vitals: There were no vitals taken for this visit.  GEN: well developed, well-nourished, in no acute distress, in a pleasant mood.     SKIN: Dewitt phototype 1  - Sun-exposed skin, which includes the head/face, neck, both arms, digits, and/or nails was examined.   - erythema  and telangiectasia involving forehead, cheeks, and chin as well as small pink papules and micropustules on bilateral cheeks  - No other lesions of concern on areas examined.     Past Medical History:   Past Medical History:   Diagnosis Date    Allergy, unspecified not elsewhere classified     using prn otc meds, w orks    Blood type O+ 10/17/2017    Depressive disorder 2006    Migraine, unspecified, without mention of intractable migraine without mention of status migrainosus     no issues any more    PIH (pregnancy induced hypertension), third trimester without severe features      Past Surgical History:   Procedure Laterality Date    [facility] ORAL SURGERY PROCEDURE  1999    wisdom teeth removal       Social History:   reports that she has never smoked. She has never used smokeless tobacco. She reports current alcohol use. She reports that she does not use drugs.    Family History:  Family History   Problem Relation Age of Onset    Hypertension Mother     Tumor Mother 65        in stomach    Uterine Cancer Mother     Hypertension Father     Allergies Father         has seasonal allergies    Skin Cancer Maternal Grandfather     Breast Cancer Paternal Grandmother         early 60's    Lung Cancer Paternal Grandfather     Diabetes Maternal Uncle     Cancer - colorectal Other         paternal aunt, dx at 54    Cancer Other         cervical cancer-treated in 2006    Melanoma No family hx of        Medications:  Current Outpatient Medications   Medication Sig Dispense Refill    doxycycline hyclate (VIBRA-TABS) 100 MG tablet Take 1 tablet (100 mg) by mouth 2 times daily 60 tablet 1    metroNIDAZOLE (METROGEL) 0.75 % external gel Apply topically 2 times daily 45 g 4    sertraline (ZOLOFT) 50 MG tablet Take 1 tablet (50 mg) by mouth daily 90 tablet 0     Allergies   Allergen Reactions    No Known Drug Allergy

## 2023-11-10 NOTE — NURSING NOTE
Dermatology Rooming Note    Jasmin Benavidez's goals for this visit include:   Chief Complaint   Patient presents with    Derm Problem     Face.  Started a couple months ago, getting worse.  Redness, dry, patchy, warm.  Feels like a pimple.  [facility] gave her hydrocortisone ointment, made it worse.      Citlalli Bloom, CMA

## 2023-11-10 NOTE — PATIENT INSTRUCTIONS
Dear Jasmin Benavidez,    We are sorry you are not feeling well. Based on the responses you provided, it is recommended that you be seen in-person in urgent care so we can better evaluate your symptoms. Please click here to find the nearest urgent care location to you.   You will not be charged for this Visit. Thank you for trusting us with your care.    ERNESTO Dc CNP

## 2023-11-24 ASSESSMENT — ENCOUNTER SYMPTOMS
JOINT SWELLING: 0
CHILLS: 0
FEVER: 0
SORE THROAT: 0
NAUSEA: 0
WEAKNESS: 0
HEMATURIA: 0
COUGH: 0
EYE PAIN: 0
DIZZINESS: 0
PALPITATIONS: 0
DIARRHEA: 0
CONSTIPATION: 0
BREAST MASS: 0
DYSURIA: 0
PARESTHESIAS: 0
SHORTNESS OF BREATH: 0
HEMATOCHEZIA: 0
ABDOMINAL PAIN: 0
ARTHRALGIAS: 0
HEARTBURN: 0
MYALGIAS: 0
HEADACHES: 0
NERVOUS/ANXIOUS: 0
FREQUENCY: 0

## 2023-11-28 RX ORDER — HYDROCORTISONE 25 MG/G
OINTMENT TOPICAL
COMMUNITY
Start: 2023-10-09

## 2023-12-01 ENCOUNTER — OFFICE VISIT (OUTPATIENT)
Dept: FAMILY MEDICINE | Facility: CLINIC | Age: 43
End: 2023-12-01
Payer: COMMERCIAL

## 2023-12-01 VITALS
SYSTOLIC BLOOD PRESSURE: 128 MMHG | WEIGHT: 168 LBS | RESPIRATION RATE: 15 BRPM | HEIGHT: 66 IN | DIASTOLIC BLOOD PRESSURE: 82 MMHG | BODY MASS INDEX: 27 KG/M2 | OXYGEN SATURATION: 100 % | TEMPERATURE: 97.5 F | HEART RATE: 53 BPM

## 2023-12-01 DIAGNOSIS — F32.1 MAJOR DEPRESSIVE DISORDER, SINGLE EPISODE, MODERATE (H): ICD-10-CM

## 2023-12-01 DIAGNOSIS — Z12.4 SCREENING FOR CERVICAL CANCER: ICD-10-CM

## 2023-12-01 DIAGNOSIS — Z00.00 ROUTINE GENERAL MEDICAL EXAMINATION AT A HEALTH CARE FACILITY: Primary | ICD-10-CM

## 2023-12-01 DIAGNOSIS — Z30.432 ENCOUNTER FOR IUD REMOVAL: ICD-10-CM

## 2023-12-01 DIAGNOSIS — Z97.5 PRESENCE OF INTRAUTERINE CONTRACEPTIVE DEVICE: ICD-10-CM

## 2023-12-01 PROCEDURE — G0145 SCR C/V CYTO,THINLAYER,RESCR: HCPCS | Performed by: FAMILY MEDICINE

## 2023-12-01 PROCEDURE — 87624 HPV HI-RISK TYP POOLED RSLT: CPT | Performed by: FAMILY MEDICINE

## 2023-12-01 PROCEDURE — 99396 PREV VISIT EST AGE 40-64: CPT | Performed by: FAMILY MEDICINE

## 2023-12-01 PROCEDURE — 91320 SARSCV2 VAC 30MCG TRS-SUC IM: CPT | Performed by: FAMILY MEDICINE

## 2023-12-01 PROCEDURE — 90480 ADMN SARSCOV2 VAC 1/ONLY CMP: CPT | Performed by: FAMILY MEDICINE

## 2023-12-01 ASSESSMENT — ENCOUNTER SYMPTOMS
HEMATURIA: 0
ABDOMINAL PAIN: 0
COUGH: 0
NERVOUS/ANXIOUS: 0
BREAST MASS: 0
FREQUENCY: 0
NAUSEA: 0
CONSTIPATION: 0
HEADACHES: 0
PALPITATIONS: 0
PARESTHESIAS: 0
HEARTBURN: 0
ARTHRALGIAS: 0
MYALGIAS: 0
DYSURIA: 0
JOINT SWELLING: 0
EYE PAIN: 0
HEMATOCHEZIA: 0
WEAKNESS: 0
DIARRHEA: 0
SORE THROAT: 0
SHORTNESS OF BREATH: 0
DIZZINESS: 0
CHILLS: 0
FEVER: 0

## 2023-12-01 ASSESSMENT — PATIENT HEALTH QUESTIONNAIRE - PHQ9
10. IF YOU CHECKED OFF ANY PROBLEMS, HOW DIFFICULT HAVE THESE PROBLEMS MADE IT FOR YOU TO DO YOUR WORK, TAKE CARE OF THINGS AT HOME, OR GET ALONG WITH OTHER PEOPLE: NOT DIFFICULT AT ALL
SUM OF ALL RESPONSES TO PHQ QUESTIONS 1-9: 3
SUM OF ALL RESPONSES TO PHQ QUESTIONS 1-9: 3

## 2023-12-01 ASSESSMENT — PAIN SCALES - GENERAL: PAINLEVEL: NO PAIN (0)

## 2023-12-01 NOTE — PATIENT INSTRUCTIONS
Preventive Health Recommendations  Female Ages 40 to 49    Yearly exam:   See your health care provider every year in order to  Review health changes.   Discuss preventive care.    Review your medicines if your doctor prescribed any.    Get a Pap test with HPV screening every 5 years.    Have a cholesterol test every 5 years.     Have a diabetes test (fasting glucose) after age 45. If you are at risk for diabetes, you should have this test every 3 years.    Shots: Get a flu shot each year. Get a tetanus shot every 10 years.     Nutrition:   Eat at least 5 servings of fruits and vegetables each day.  Eat whole-grain bread, whole-wheat pasta and brown rice instead of white grains and rice.  Get adequate Calcium and Vitamin D.      Lifestyle  Exercise at least 150 minutes a week (an average of 30 minutes a day, 5 days a week). This will help you control your weight and prevent disease.  Limit alcohol to one drink per day.  No smoking.   Wear sunscreen to prevent skin cancer.  See your dentist every six months for an exam and cleaning.

## 2023-12-01 NOTE — PROGRESS NOTES
SUBJECTIVE:   Jasmin is a 43 year old, presenting for the following:  Physical (Physical )  She would like IUD removed today,  had vasectomy several months ago and he has had follow up appointment demonstrating effective procedure.        12/1/2023     9:37 AM   Additional Questions   Roomed by Kimberly Hanna       Healthy Habits:     Getting at least 3 servings of Calcium per day:  Yes    Bi-annual eye exam:  Yes    Dental care twice a year:  Yes    Sleep apnea or symptoms of sleep apnea:  None    Diet:  Regular (no restrictions)    Frequency of exercise:  2-3 days/week    Duration of exercise:  30-45 minutes    Taking medications regularly:  Yes    Medication side effects:  None    Additional concerns today:  Yes      Today's PHQ-9 Score:       12/1/2023     9:18 AM   PHQ-9 SCORE   PHQ-9 Total Score MyChart 3 (Minimal depression)   PHQ-9 Total Score 3           Depression Followup  How are you doing with your depression since your last visit? No change  Are you having other symptoms that might be associated with depression? No  Have you had a significant life event?  No   Are you feeling anxious or having panic attacks?   No  Do you have any concerns with your use of alcohol or other drugs? No    Social History     Tobacco Use    Smoking status: Never    Smokeless tobacco: Never   Substance Use Topics    Alcohol use: Yes    Drug use: No         2/18/2021     8:42 AM 5/19/2022     3:08 PM 12/1/2023     9:18 AM   PHQ   PHQ-9 Total Score 3 4 3   Q9: Thoughts of better off dead/self-harm past 2 weeks Not at all Not at all Not at all         12/4/2013     9:25 AM 9/21/2018     8:48 AM 7/12/2019     3:50 PM   RAVEN-7 SCORE   Total Score 7     Total Score   7 (mild anxiety)   Total Score  1 7       Suicide Assessment Five-step Evaluation and Treatment (SAFE-T)      Social History     Tobacco Use    Smoking status: Never    Smokeless tobacco: Never   Substance Use Topics    Alcohol use: Yes             11/24/2023     11:39 AM   Alcohol Use   Prescreen: >3 drinks/day or >7 drinks/week? No          No data to display              Reviewed orders with patient.  Reviewed health maintenance and updated orders accordingly - Yes  BP Readings from Last 3 Encounters:   12/01/23 128/82   05/19/22 126/78   02/18/21 110/68    Wt Readings from Last 3 Encounters:   12/01/23 76.2 kg (168 lb)   05/19/22 77.1 kg (170 lb)   02/18/21 76.7 kg (169 lb)                  Patient Active Problem List   Diagnosis    Moderate major depression (HCC)    Blood type O+    Presence of intrauterine contraceptive device    Major depressive disorder, single episode, moderate (H)     Past Surgical History:   Procedure Laterality Date    [facility] ORAL SURGERY PROCEDURE  1999    wisdom teeth removal       Social History     Tobacco Use    Smoking status: Never    Smokeless tobacco: Never   Substance Use Topics    Alcohol use: Yes     Family History   Problem Relation Age of Onset    Hypertension Mother     Tumor Mother 65        small cell squamous cancer of stomach    Uterine Cancer Mother 55        s/p DAHIANA    Hypertension Father     Allergies Father         has seasonal allergies    Depression Father     Skin Cancer Maternal Grandfather     Breast Cancer Paternal Grandmother         early 60's    Lung Cancer Paternal Grandfather     Diabetes Maternal Uncle     Cancer - colorectal Other         paternal aunt, dx at 54    Cancer Other         cervical cancer-treated in 2006    Melanoma No family hx of          Current Outpatient Medications   Medication Sig Dispense Refill    doxycycline hyclate (VIBRA-TABS) 100 MG tablet Take 1 tablet (100 mg) by mouth 2 times daily 60 tablet 1    hydrocortisone 2.5 % ointment APPLY TOPICALLY TWICE DAILY X 7 DAYS      metroNIDAZOLE (METROGEL) 0.75 % external gel Apply topically 2 times daily 45 g 4    sertraline (ZOLOFT) 50 MG tablet Take 1 tablet (50 mg) by mouth daily 90 tablet 3     Allergies   Allergen Reactions    No Known Drug  Allergy        Breast Cancer Screening:    FHS-7:       2022     3:11 PM 2023    11:45 AM   Breast CA Risk Assessment (FHS-7)   Did any of your first-degree relatives have breast or ovarian cancer? No Yes   Did any of your relatives have bilateral breast cancer? No No   Did any man in your family have breast cancer? No No   Did any woman in your family have breast and ovarian cancer? Yes Yes   Did any woman in your family have breast cancer before age 50 y? No No   Do you have 2 or more relatives with breast and/or ovarian cancer? No Yes   Do you have 2 or more relatives with breast and/or bowel cancer? No No     Pertinent mammograms are reviewed under the imaging tab.    History of abnormal Pap smear: NO - age 30-65 PAP every 5 years with negative HPV co-testing recommended      Latest Ref Rng & Units 2021     9:15 AM 2021     8:53 AM 2015     6:04 PM   PAP / HPV   PAP (Historical)   NIL     HPV 16 DNA NEG^Negative Negative   Negative    HPV 18 DNA NEG^Negative Negative   Negative    Other HR HPV NEG^Negative Negative   Negative      Reviewed and updated as needed this visit by clinical staff   Tobacco  Allergies  Meds  Problems   Surg Hx           Reviewed and updated as needed this visit by Provider      Problems   Surg Hx          Past Medical History:   Diagnosis Date    Allergy, unspecified not elsewhere classified     using prn otc meds, w orks    Blood type O+ 10/17/2017    Depressive disorder 2006    Migraine, unspecified, without mention of intractable migraine without mention of status migrainosus     no issues any more    PIH (pregnancy induced hypertension), third trimester without severe features       Past Surgical History:   Procedure Laterality Date    [facility] ORAL SURGERY PROCEDURE      wisdom teeth removal     OB History    Para Term  AB Living   2 2 2 0 0 2   SAB IAB Ectopic Multiple Live Births   0 0 0 0 2      # Outcome Date GA Lbr Marcos/2nd Weight  Sex Delivery Anes PTL Lv   2 Term 10/13/17 40w4d 06:49 / 00:02 3.997 kg (8 lb 13 oz) M Vag-Spont None N FAM      Complications: Prolonged PROM (>18 hours), GBS      Name: Arash      Apgar1: 8  Apgar5: 9   1 Term 10/30/15 40w4d 07:35 / 04:48 3.685 kg (8 lb 2 oz) F Vag-Spont Local  FAM      Name: Merissa Napier      Apgar1: 9  Apgar5: 9      Obstetric Comments   10/13/17 Patient here with PROM 10/12/17 @ 0400 , received 4 doses of miso, and then augmentation/induction of labor with rupture of membranes.  Pitocin started at 0800 4/13/17.  Labored with pitocin all day making slow progress.  2251 called out with rupture of bulging forbag and quickly progressed to pushing.  Delivered standing at side of bed per patient preference through 2 contractions.   and spouse Cullen present with great support.  Perineum sustained second degree lac that was repaired.  Infant directly skin to skin, mother bonding well.        Review of Systems   Constitutional:  Negative for chills and fever.   HENT:  Negative for congestion, ear pain, hearing loss and sore throat.    Eyes:  Negative for pain and visual disturbance.   Respiratory:  Negative for cough and shortness of breath.    Cardiovascular:  Negative for chest pain, palpitations and peripheral edema.   Gastrointestinal:  Negative for abdominal pain, constipation, diarrhea, heartburn, hematochezia and nausea.   Breasts:  Negative for tenderness, breast mass and discharge.   Genitourinary:  Negative for dysuria, frequency, genital sores, hematuria, pelvic pain, urgency, vaginal bleeding and vaginal discharge.   Musculoskeletal:  Negative for arthralgias, joint swelling and myalgias.   Skin:  Negative for rash.   Neurological:  Negative for dizziness, weakness, headaches and paresthesias.   Psychiatric/Behavioral:  Negative for mood changes. The patient is not nervous/anxious.       OBJECTIVE:   /82 (BP Location: Right arm, Patient Position: Sitting, Cuff Size: Adult  "Regular)   Pulse 53   Temp 97.5  F (36.4  C) (Temporal)   Resp 15   Ht 1.68 m (5' 6.14\")   Wt 76.2 kg (168 lb)   SpO2 100%   BMI 27.00 kg/m    Physical Exam  GENERAL: healthy, alert and no distress  EYES: Eyes grossly normal to inspection, PERRL and conjunctivae and sclerae normal  HENT: ear canals and TM's normal, nose and mouth without ulcers or lesions  NECK: no adenopathy, no asymmetry, masses, or scars and thyroid normal to palpation  RESP: lungs clear to auscultation - no rales, rhonchi or wheezes  CV: regular rate and rhythm, normal S1 S2, no S3 or S4, no murmur, click or rub, no peripheral edema and peripheral pulses strong  ABDOMEN: soft, nontender, no hepatosplenomegaly, no masses and bowel sounds normal   (female): normal female external genitalia, normal urethral meatus, vaginal mucosa pink, moist, well rugated, and normal cervix/adnexa/uterus without masses or discharge  MS: no gross musculoskeletal defects noted, no edema  SKIN: no suspicious lesions or rashes  NEURO: Normal strength and tone, mentation intact and speech normal  PSYCH: mentation appears normal, affect normal/bright    ASSESSMENT/PLAN:   (Z00.00) Routine general medical examination at a health care facility  (primary encounter diagnosis)  (Z12.4) Screening for cervical cancer  Comment: routine, healthy woman, no concerns  Plan: Pap screen with HPV - recommended age 30 - 65         years          (F32.1) Major depressive disorder, single episode, moderate (H)  Comment: At goal  The current medical regimen is effective;  continue present plan and medications.    Plan: sertraline (ZOLOFT) 50 MG tablet, DISCONTINUED:        sertraline (ZOLOFT) 50 MG tablet              2/18/2021     8:42 AM 5/19/2022     3:08 PM 12/1/2023     9:18 AM   PHQ   PHQ-9 Total Score 3 4 3   Q9: Thoughts of better off dead/self-harm past 2 weeks Not at all Not at all Not at all         (Z97.5) Presence of intrauterine contraceptive device  (Z30.432) " Encounter for IUD removal  Comment: PROCEDURE:  IUD Removal  Type of IUD: Mirena    She is placed in a dorsal lithotomy potion and a pelvic exam is performed to determine the position of the uterus.  The cervix is identified and IUD strings clearly visible.  Ring forceps used to grasp the strings, and the IUD is gently and quickly removed.  No significant resistance or difficult noted. There were no complications. Patient tolerated the procedure well with no bleeding noted.   Findings: intact Mirena IUD disposed of in hazardous waste trash.    The patient tolerated this procedure without immediate complication.  The patient is to return or call immediately for any unexplained fever, abdominal or pelvic pain, excessive bleeding, possibility of pregnancy, foul-smelling discharge, although the risk is low.    COUNSELING:  Reviewed preventive health counseling, as reflected in patient instructions        She reports that she has never smoked. She has never used smokeless tobacco.          Stephany Noriega MD  [facility]  Answers submitted by the patient for this visit:  Patient Health Questionnaire (Submitted on 12/1/2023)  If you checked off any problems, how difficult have these problems made it for you to do your work, take care of things at home, or get along with other people?: Not difficult at all  PHQ9 TOTAL SCORE: 3

## 2023-12-05 LAB
BKR LAB AP GYN ADEQUACY: NORMAL
BKR LAB AP GYN INTERPRETATION: NORMAL
BKR LAB AP HPV REFLEX: NORMAL
BKR LAB AP PREVIOUS ABNORMAL: NORMAL
PATH REPORT.COMMENTS IMP SPEC: NORMAL
PATH REPORT.COMMENTS IMP SPEC: NORMAL
PATH REPORT.RELEVANT HX SPEC: NORMAL

## 2023-12-07 LAB
HUMAN PAPILLOMA VIRUS 16 DNA: NEGATIVE
HUMAN PAPILLOMA VIRUS 18 DNA: NEGATIVE
HUMAN PAPILLOMA VIRUS FINAL DIAGNOSIS: NORMAL
HUMAN PAPILLOMA VIRUS OTHER HR: NEGATIVE

## 2024-01-03 DIAGNOSIS — L71.9 ROSACEA: ICD-10-CM

## 2024-02-07 NOTE — PROGRESS NOTES
Feeling well overall, c/o feeling big and slow.  Review how to call us for questions or labor.  RTC 1 wk JR   Patient was notified about the medication. She states that she can't get in with the lung dr.   She has been having a lot of coughing with a lot of phlegm coming up. She states that it get to the point where she starts vomiting.     She is asking for an abx. I advised that I would let joe know but she might have to come to the walk in clinic so someone can listen to her lungs.

## 2024-02-09 ENCOUNTER — OFFICE VISIT (OUTPATIENT)
Dept: DERMATOLOGY | Facility: CLINIC | Age: 44
End: 2024-02-09
Attending: STUDENT IN AN ORGANIZED HEALTH CARE EDUCATION/TRAINING PROGRAM
Payer: COMMERCIAL

## 2024-02-09 DIAGNOSIS — L57.8 ACTINIC SKIN DAMAGE: ICD-10-CM

## 2024-02-09 DIAGNOSIS — L71.9 ROSACEA: Primary | ICD-10-CM

## 2024-02-09 PROCEDURE — 99214 OFFICE O/P EST MOD 30 MIN: CPT | Performed by: STUDENT IN AN ORGANIZED HEALTH CARE EDUCATION/TRAINING PROGRAM

## 2024-02-09 RX ORDER — METRONIDAZOLE 7.5 MG/G
GEL TOPICAL 2 TIMES DAILY
Qty: 45 G | Refills: 11 | Status: SHIPPED | OUTPATIENT
Start: 2024-02-09

## 2024-02-09 NOTE — NURSING NOTE
Dermatology Rooming Note    Jasmin Benavidez's goals for this visit include:   Chief Complaint   Patient presents with    Derm Problem     Follow up for rosacea on face.      Roberto Diaz, EMT-B

## 2024-02-09 NOTE — PROGRESS NOTES
[facility] Dermatology Note    Encounter Date: Feb 9, 2024    Dermatology Problem List:  # Rosacea - papulopustular type more prevalent than erythematous telangiectatic type  - Current tx: doxycycline 100 mg twice daily, metronidazole 0.75% gel twice daily (started 11/10/2023)  - Previous tx: Protopic and hydrocortisone    Major PMHx  -   ______________________________________    Impression/Plan:  Jasmin was seen today for derm problem.    Diagnoses and all orders for this visit:    Rosacea  -     metroNIDAZOLE (METROGEL) 0.75 % external gel; Apply topically 2 times daily  -Patient is status post oral doxycycline and topical metronidazole gel  - Also wearing sunscreen regularly  - Noticed a significant improvement in redness and papulopustular rosacea with the treatment regimen  - Due to improvement discontinued the prescription topicals continue with daily sunscreen  - Overall things have been maintained a high level control though she does notice a slight increase in the number pimple-like bumps since discontinuing the metronidazole gel  - Discussed that this topical safe to use in the long-term and there are concerns of significant microbiome disturbances    Actinic skin damage  - Reviewed the compounding benefits of incremental changes to sun protective clothing behaviors including increased frequency of sunscreen and sun protective clothing like broad brimmed hats and longsleeved UPF containing clothing    Other orders  -     Adult Dermatology Clinic Follow-Up Order (Blank)  -     Adult Dermatology  Referral        Follow-up in 1 year .       Staff Involved:  Staff Only    Edward Zurita MD   of Dermatology  Department of Dermatology  [facility]      CC:   Chief Complaint   Patient presents with    Derm Problem     Follow up for rosacea on face.        History of Present Illness:  Ms. Jasmin Benavidez is a 43 year old female who presents  as a follow-up patient for rosacea.    She was last seen here on 11/10/2023 for rash on bilateral cheeks with occasional burning and flushing, determined to be rosacea. She was started on doxycycline 100 mg twice daily and topical metronidazole gel twice daily.     Took the doxy 50% of the tmie over the course of 2 months. Was very diligent about using metro gel and suncreen.     Stopped topical 1 mo ago. Continued sunblock. Has questions about implications of long term use     Labs:      Physical exam:  Vitals: There were no vitals taken for this visit.  GEN: well developed, well-nourished, in no acute distress, in a pleasant mood.     SKIN: Dewitt phototype 1  - Sun-exposed skin, which includes the head/face, neck, both arms, digits, and/or nails was examined.   - erythema and telangiectasia involving forehead, cheeks, and chin   - No other lesions of concern on areas examined.     Past Medical History:   Past Medical History:   Diagnosis Date    Allergy, unspecified not elsewhere classified     using prn otc meds, w orks    Blood type O+ 10/17/2017    Depressive disorder 2006    Migraine, unspecified, without mention of intractable migraine without mention of status migrainosus     no issues any more    PIH (pregnancy induced hypertension), third trimester without severe features      Past Surgical History:   Procedure Laterality Date    [facility] ORAL SURGERY PROCEDURE  1999    wisdom teeth removal       Social History:   reports that she has never smoked. She has never used smokeless tobacco. She reports current alcohol use. She reports that she does not use drugs.    Family History:  Family History   Problem Relation Age of Onset    Hypertension Mother     Tumor Mother 65        small cell squamous cancer of stomach    Uterine Cancer Mother 55        s/p DAHIANA    Hypertension Father     Allergies Father         has seasonal allergies    Depression Father     Skin Cancer Maternal Grandfather     Breast Cancer  Paternal Grandmother         early 60's    Lung Cancer Paternal Grandfather     Diabetes Maternal Uncle     Cancer - colorectal Other         paternal aunt, dx at 54    Cancer Other         cervical cancer-treated in 2006    Melanoma No family hx of        Medications:  Current Outpatient Medications   Medication Sig Dispense Refill    doxycycline hyclate (VIBRA-TABS) 100 MG tablet Take 1 tablet (100 mg) by mouth 2 times daily 60 tablet 1    hydrocortisone 2.5 % ointment APPLY TOPICALLY TWICE DAILY X 7 DAYS      metroNIDAZOLE (METROGEL) 0.75 % external gel Apply topically 2 times daily 45 g 11    sertraline (ZOLOFT) 50 MG tablet Take 1 tablet (50 mg) by mouth daily 90 tablet 3     Allergies   Allergen Reactions    No Known Drug Allergy

## 2024-02-09 NOTE — LETTER
2/9/2024       RE: Jasmin Benavidez  [address] [facility] [place] [address]     Dear Colleague,    Thank you for referring your patient, Jasmin Benavidez, to the [facility] DERMATOLOGY CLINIC [place] at [facility]. Please see a copy of my visit note below.    [facility] Dermatology Note    Encounter Date: Feb 9, 2024    Dermatology Problem List:  # Rosacea - papulopustular type more prevalent than erythematous telangiectatic type  - Current tx: doxycycline 100 mg twice daily, metronidazole 0.75% gel twice daily (started 11/10/2023)  - Previous tx: Protopic and hydrocortisone    Major PMHx  -   ______________________________________    Impression/Plan:  Jasmin was seen today for derm problem.    Diagnoses and all orders for this visit:    Rosacea  -     metroNIDAZOLE (METROGEL) 0.75 % external gel; Apply topically 2 times daily  -Patient is status post oral doxycycline and topical metronidazole gel  - Also wearing sunscreen regularly  - Noticed a significant improvement in redness and papulopustular rosacea with the treatment regimen  - Due to improvement discontinued the prescription topicals continue with daily sunscreen  - Overall things have been maintained a high level control though she does notice a slight increase in the number pimple-like bumps since discontinuing the metronidazole gel  - Discussed that this topical safe to use in the long-term and there are concerns of significant microbiome disturbances    Actinic skin damage  - Reviewed the compounding benefits of incremental changes to sun protective clothing behaviors including increased frequency of sunscreen and sun protective clothing like broad brimmed hats and longsleeved UPF containing clothing    Other orders  -     Adult Dermatology Clinic Follow-Up Order (Blank)  -     Adult Dermatology  Referral        Follow-up in 1 year .       Staff Involved:  Staff Only    Edward  MD Yudith   of Dermatology  Department of Dermatology  [facility] [employer]      CC:   Chief Complaint   Patient presents with    Derm Problem     Follow up for rosacea on face.        History of Present Illness:  Ms. Jasmin Benavidez is a 43 year old female who presents as a follow-up patient for rosacea.    She was last seen here on 11/10/2023 for rash on bilateral cheeks with occasional burning and flushing, determined to be rosacea. She was started on doxycycline 100 mg twice daily and topical metronidazole gel twice daily.     Took the doxy 50% of the tmie over the course of 2 months. Was very diligent about using metro gel and suncreen.     Stopped topical 1 mo ago. Continued sunblock. Has questions about implications of long term use     Labs:      Physical exam:  Vitals: There were no vitals taken for this visit.  GEN: well developed, well-nourished, in no acute distress, in a pleasant mood.     SKIN: Dewitt phototype 1  - Sun-exposed skin, which includes the head/face, neck, both arms, digits, and/or nails was examined.   - erythema and telangiectasia involving forehead, cheeks, and chin   - No other lesions of concern on areas examined.     Past Medical History:   Past Medical History:   Diagnosis Date    Allergy, unspecified not elsewhere classified     using prn otc meds, w orks    Blood type O+ 10/17/2017    Depressive disorder 2006    Migraine, unspecified, without mention of intractable migraine without mention of status migrainosus     no issues any more    PIH (pregnancy induced hypertension), third trimester without severe features      Past Surgical History:   Procedure Laterality Date    [facility] ORAL SURGERY PROCEDURE  1999    wisdom teeth removal       Social History:   reports that she has never smoked. She has never used smokeless tobacco. She reports current alcohol use. She reports that she does not use drugs.    Family History:  Family History   Problem  Relation Age of Onset    Hypertension Mother     Tumor Mother 65        small cell squamous cancer of stomach    Uterine Cancer Mother 55        s/p DAHIANA    Hypertension Father     Allergies Father         has seasonal allergies    Depression Father     Skin Cancer Maternal Grandfather     Breast Cancer Paternal Grandmother         early 60's    Lung Cancer Paternal Grandfather     Diabetes Maternal Uncle     Cancer - colorectal Other         paternal aunt, dx at 54    Cancer Other         cervical cancer-treated in 2006    Melanoma No family hx of        Medications:  Current Outpatient Medications   Medication Sig Dispense Refill    doxycycline hyclate (VIBRA-TABS) 100 MG tablet Take 1 tablet (100 mg) by mouth 2 times daily 60 tablet 1    hydrocortisone 2.5 % ointment APPLY TOPICALLY TWICE DAILY X 7 DAYS      metroNIDAZOLE (METROGEL) 0.75 % external gel Apply topically 2 times daily 45 g 11    sertraline (ZOLOFT) 50 MG tablet Take 1 tablet (50 mg) by mouth daily 90 tablet 3     Allergies   Allergen Reactions    No Known Drug Allergy

## 2024-03-24 ENCOUNTER — HEALTH MAINTENANCE LETTER (OUTPATIENT)
Age: 44
End: 2024-03-24

## 2024-04-02 RX ORDER — DOXYCYCLINE HYCLATE 100 MG
100 TABLET ORAL 2 TIMES DAILY
Qty: 60 TABLET | Refills: 1 | OUTPATIENT
Start: 2024-04-02

## 2024-09-06 ENCOUNTER — PATIENT OUTREACH (OUTPATIENT)
Dept: CARE COORDINATION | Facility: CLINIC | Age: 44
End: 2024-09-06
Payer: COMMERCIAL

## 2025-02-07 PROBLEM — Z97.5 PRESENCE OF INTRAUTERINE CONTRACEPTIVE DEVICE: Status: RESOLVED | Noted: 2018-09-21 | Resolved: 2025-02-07

## 2025-02-07 PROBLEM — Z13.6 CARDIOVASCULAR SCREENING; LDL GOAL LESS THAN 160: Status: ACTIVE | Noted: 2025-02-07

## 2025-02-10 ENCOUNTER — TRANSFERRED RECORDS (OUTPATIENT)
Dept: HEALTH INFORMATION MANAGEMENT | Facility: CLINIC | Age: 45
End: 2025-02-10
Payer: COMMERCIAL

## 2025-02-10 ENCOUNTER — PATIENT OUTREACH (OUTPATIENT)
Dept: CARE COORDINATION | Facility: CLINIC | Age: 45
End: 2025-02-10
Payer: COMMERCIAL

## 2025-05-12 ENCOUNTER — TRANSFERRED RECORDS (OUTPATIENT)
Dept: HEALTH INFORMATION MANAGEMENT | Facility: CLINIC | Age: 45
End: 2025-05-12
Payer: COMMERCIAL

## 2025-07-30 ENCOUNTER — OFFICE VISIT (OUTPATIENT)
Dept: URGENT CARE | Facility: URGENT CARE | Age: 45
End: 2025-07-30
Payer: COMMERCIAL

## 2025-07-30 VITALS
RESPIRATION RATE: 18 BRPM | TEMPERATURE: 97.7 F | OXYGEN SATURATION: 98 % | DIASTOLIC BLOOD PRESSURE: 81 MMHG | HEART RATE: 76 BPM | SYSTOLIC BLOOD PRESSURE: 133 MMHG | BODY MASS INDEX: 29.73 KG/M2 | WEIGHT: 185.6 LBS

## 2025-07-30 DIAGNOSIS — J01.40 ACUTE NON-RECURRENT PANSINUSITIS: Primary | ICD-10-CM

## 2025-07-30 PROCEDURE — 3075F SYST BP GE 130 - 139MM HG: CPT | Performed by: STUDENT IN AN ORGANIZED HEALTH CARE EDUCATION/TRAINING PROGRAM

## 2025-07-30 PROCEDURE — 3079F DIAST BP 80-89 MM HG: CPT | Performed by: STUDENT IN AN ORGANIZED HEALTH CARE EDUCATION/TRAINING PROGRAM

## 2025-07-30 PROCEDURE — 99213 OFFICE O/P EST LOW 20 MIN: CPT | Performed by: STUDENT IN AN ORGANIZED HEALTH CARE EDUCATION/TRAINING PROGRAM

## 2025-07-30 PROCEDURE — 1125F AMNT PAIN NOTED PAIN PRSNT: CPT | Performed by: STUDENT IN AN ORGANIZED HEALTH CARE EDUCATION/TRAINING PROGRAM

## 2025-07-30 RX ORDER — DOXYCYCLINE 100 MG/1
100 CAPSULE ORAL 2 TIMES DAILY
Qty: 20 CAPSULE | Refills: 0 | Status: SHIPPED | OUTPATIENT
Start: 2025-07-30 | End: 2025-08-09

## 2025-07-30 ASSESSMENT — PAIN SCALES - GENERAL: PAINLEVEL_OUTOF10: MODERATE PAIN (4)

## 2025-07-30 NOTE — PROGRESS NOTES
ASSESSMENT & PLAN:   Diagnoses and all orders for this visit:  Acute non-recurrent pansinusitis  -     doxycycline hyclate (VIBRAMYCIN) 100 MG capsule; Take 1 capsule (100 mg) by mouth 2 times daily for 10 days.      Sinus congestion x 11 days. With duration of symptoms and double worsening, will treat for sinusitis with doxycycline x 10 days. Add Flonase daily.     There are no Patient Instructions on file for this visit.    No follow-ups on file.    At the end of the encounter, I discussed results, diagnosis, medications. Discussed red flags for immediate return to clinic/ER, as well as indications for follow up if no improvement. Patient and/or caregiver understood and agreed to plan. Patient was stable for discharge.    ------------------------------------------------------------------------  SUBJECTIVE  History was obtained from patient.    Patient presents with:  Sinus Problem: Started getting congested July 19th and after that started feeling crummy and has been taking Mucinex D and using florencio pot and on Monday felt great but Tuesday right side of face has started becoming painful and full     HPI  Jasmin Benavidez is a(n) 44 year old female presenting to urgent care for sinus congestion x 11 days. Symptoms improved 2 days ago but worsened again yesterday. Endorses right-sided facial pain, pressure, and dental pain on right. Continues to have mucus on right side. No cough or fever.     Current Outpatient Medications   Medication Sig Dispense Refill    doxycycline hyclate (VIBRAMYCIN) 100 MG capsule Take 1 capsule (100 mg) by mouth 2 times daily for 10 days. 20 capsule 0    sertraline (ZOLOFT) 50 MG tablet Take 1 tablet (50 mg) by mouth daily. 90 tablet 3    hydrocortisone 2.5 % ointment APPLY TOPICALLY TWICE DAILY X 7 DAYS (Patient not taking: Reported on 7/30/2025)           OBJECTIVE  Vitals:    07/30/25 1643   BP: 133/81   BP Location: Left arm   Patient Position: Sitting   Cuff Size: Adult Regular   Pulse: 76    Resp: 18   Temp: 97.7  F (36.5  C)   TempSrc: Tympanic   SpO2: 98%   Weight: 84.2 kg (185 lb 9.6 oz)     Physical Exam   GENERAL: healthy, alert, no acute distress.   PSYCH: mentation appears normal. Normal affect  HEAD: normocephalic, atraumatic.  EYE: PERRL. EOMs intact. No scleral injection bilaterally.   NOSE: external nose atraumatic without lesions.  OROPHARYNX: moist mucous membranes.   LUNGS: no increased work of breathing.     No results found for any visits on 07/30/25.

## 2025-07-30 NOTE — PROGRESS NOTES
Urgent Care Clinic Visit    Chief Complaint   Patient presents with    Sinus Problem     Started getting congested July 19th and after that started feeling crummy and has been taking Mucinex D and using florencio pot and on Monday felt great but Tuesday right side of face has started becoming painful and full                7/30/2025     4:44 PM   Additional Questions   Roomed by Kori   Accompanied by Self         7/30/2025     4:44 PM   Patient Reported Additional Medications   Patient reports taking the following new medications Mucinex